# Patient Record
Sex: FEMALE | Race: WHITE | NOT HISPANIC OR LATINO | URBAN - METROPOLITAN AREA
[De-identification: names, ages, dates, MRNs, and addresses within clinical notes are randomized per-mention and may not be internally consistent; named-entity substitution may affect disease eponyms.]

---

## 2020-03-03 ENCOUNTER — APPOINTMENT (OUTPATIENT)
Dept: WOMENS IMAGING | Facility: HOSPITAL | Age: 65
End: 2020-03-03

## 2020-03-03 PROCEDURE — 77067 SCR MAMMO BI INCL CAD: CPT | Performed by: RADIOLOGY

## 2023-07-28 ENCOUNTER — OFFICE VISIT (OUTPATIENT)
Dept: FAMILY MEDICINE CLINIC | Facility: CLINIC | Age: 68
End: 2023-07-28
Payer: MEDICARE

## 2023-07-28 VITALS
WEIGHT: 138.5 LBS | HEART RATE: 52 BPM | OXYGEN SATURATION: 94 % | DIASTOLIC BLOOD PRESSURE: 80 MMHG | TEMPERATURE: 95.7 F | SYSTOLIC BLOOD PRESSURE: 120 MMHG | BODY MASS INDEX: 23.64 KG/M2 | HEIGHT: 64 IN

## 2023-07-28 DIAGNOSIS — I10 PRIMARY HYPERTENSION: Primary | ICD-10-CM

## 2023-07-28 DIAGNOSIS — Z98.890 PERIPHERAL ARTERIAL DISEASE WITH HISTORY OF REVASCULARIZATION: ICD-10-CM

## 2023-07-28 DIAGNOSIS — Z87.81 HISTORY OF FRACTURED PELVIS: ICD-10-CM

## 2023-07-28 DIAGNOSIS — Z79.899 HIGH RISK MEDICATION USE: ICD-10-CM

## 2023-07-28 DIAGNOSIS — F33.40 RECURRENT MAJOR DEPRESSIVE DISORDER, IN REMISSION: ICD-10-CM

## 2023-07-28 DIAGNOSIS — Z13.1 SCREENING FOR DIABETES MELLITUS: ICD-10-CM

## 2023-07-28 DIAGNOSIS — Z78.0 MENOPAUSE: ICD-10-CM

## 2023-07-28 DIAGNOSIS — Z12.31 ENCOUNTER FOR SCREENING MAMMOGRAM FOR MALIGNANT NEOPLASM OF BREAST: ICD-10-CM

## 2023-07-28 DIAGNOSIS — Z23 ENCOUNTER FOR IMMUNIZATION: ICD-10-CM

## 2023-07-28 DIAGNOSIS — J41.0 SIMPLE CHRONIC BRONCHITIS: ICD-10-CM

## 2023-07-28 DIAGNOSIS — J45.40 MODERATE PERSISTENT ASTHMA WITHOUT COMPLICATION: ICD-10-CM

## 2023-07-28 DIAGNOSIS — E67.3 HIGH VITAMIN D LEVEL: ICD-10-CM

## 2023-07-28 DIAGNOSIS — E78.5 HYPERLIPIDEMIA, UNSPECIFIED HYPERLIPIDEMIA TYPE: ICD-10-CM

## 2023-07-28 DIAGNOSIS — I73.9 PERIPHERAL ARTERIAL DISEASE WITH HISTORY OF REVASCULARIZATION: ICD-10-CM

## 2023-07-28 DIAGNOSIS — Z11.59 NEED FOR HEPATITIS C SCREENING TEST: ICD-10-CM

## 2023-07-28 DIAGNOSIS — Z87.891 PERSONAL HISTORY OF TOBACCO USE: ICD-10-CM

## 2023-07-28 DIAGNOSIS — Z12.11 SCREENING FOR COLON CANCER: ICD-10-CM

## 2023-07-28 DIAGNOSIS — F41.1 GENERALIZED ANXIETY DISORDER: ICD-10-CM

## 2023-07-28 PROBLEM — M54.16 LUMBAR RADICULOPATHY: Status: ACTIVE | Noted: 2018-02-22

## 2023-07-28 PROBLEM — M43.10 SPONDYLOLISTHESIS: Status: ACTIVE | Noted: 2018-02-22

## 2023-07-28 PROBLEM — M54.50 LOW BACK PAIN: Status: ACTIVE | Noted: 2018-02-22

## 2023-07-28 PROBLEM — J45.909 ASTHMA: Status: ACTIVE | Noted: 2018-02-22

## 2023-07-28 PROCEDURE — 3074F SYST BP LT 130 MM HG: CPT | Performed by: PHYSICIAN ASSISTANT

## 2023-07-28 PROCEDURE — 3079F DIAST BP 80-89 MM HG: CPT | Performed by: PHYSICIAN ASSISTANT

## 2023-07-28 PROCEDURE — 99204 OFFICE O/P NEW MOD 45 MIN: CPT | Performed by: PHYSICIAN ASSISTANT

## 2023-07-28 RX ORDER — ESCITALOPRAM OXALATE 10 MG/1
1 TABLET ORAL DAILY
COMMUNITY
Start: 2023-05-20 | End: 2023-07-28 | Stop reason: SDUPTHER

## 2023-07-28 RX ORDER — NEBIVOLOL 5 MG/1
1 TABLET ORAL DAILY
COMMUNITY
Start: 2023-07-09 | End: 2023-07-28 | Stop reason: SDUPTHER

## 2023-07-28 RX ORDER — ATORVASTATIN CALCIUM 20 MG/1
20 TABLET, FILM COATED ORAL DAILY
Qty: 90 TABLET | Refills: 1 | Status: SHIPPED | OUTPATIENT
Start: 2023-07-28

## 2023-07-28 RX ORDER — AMLODIPINE BESYLATE 10 MG/1
10 TABLET ORAL DAILY
COMMUNITY
Start: 2023-07-14 | End: 2023-07-28 | Stop reason: SDUPTHER

## 2023-07-28 RX ORDER — LISINOPRIL 30 MG/1
30 TABLET ORAL EVERY 12 HOURS SCHEDULED
Qty: 180 TABLET | Refills: 1 | Status: SHIPPED | OUTPATIENT
Start: 2023-07-28

## 2023-07-28 RX ORDER — NEBIVOLOL 5 MG/1
5 TABLET ORAL DAILY
Qty: 90 TABLET | Refills: 1 | Status: SHIPPED | OUTPATIENT
Start: 2023-07-28

## 2023-07-28 RX ORDER — TIOTROPIUM BROMIDE INHALATION SPRAY 3.12 UG/1
2 SPRAY, METERED RESPIRATORY (INHALATION) DAILY
COMMUNITY
Start: 2023-06-19 | End: 2023-07-28 | Stop reason: SDUPTHER

## 2023-07-28 RX ORDER — TIOTROPIUM BROMIDE INHALATION SPRAY 3.12 UG/1
2 SPRAY, METERED RESPIRATORY (INHALATION)
Qty: 12 G | Refills: 1 | Status: SHIPPED | OUTPATIENT
Start: 2023-07-28

## 2023-07-28 RX ORDER — AMLODIPINE BESYLATE 10 MG/1
10 TABLET ORAL DAILY
Qty: 90 TABLET | Refills: 1 | Status: SHIPPED | OUTPATIENT
Start: 2023-07-28

## 2023-07-28 RX ORDER — ATORVASTATIN CALCIUM 20 MG/1
1 TABLET, FILM COATED ORAL DAILY
COMMUNITY
Start: 2023-05-13 | End: 2023-07-28 | Stop reason: SDUPTHER

## 2023-07-28 RX ORDER — ESCITALOPRAM OXALATE 10 MG/1
10 TABLET ORAL DAILY
Qty: 90 TABLET | Refills: 1 | Status: SHIPPED | OUTPATIENT
Start: 2023-07-28

## 2023-07-28 RX ORDER — HYDROCODONE BITARTRATE AND ACETAMINOPHEN 5; 325 MG/1; MG/1
TABLET ORAL AS NEEDED
COMMUNITY
Start: 2023-07-27

## 2023-07-28 RX ORDER — LISINOPRIL 30 MG/1
1 TABLET ORAL EVERY 12 HOURS SCHEDULED
COMMUNITY
Start: 2023-06-27 | End: 2023-07-28 | Stop reason: SDUPTHER

## 2023-07-28 RX ORDER — ASPIRIN 81 MG/1
81 TABLET ORAL DAILY
COMMUNITY

## 2023-07-28 NOTE — ASSESSMENT & PLAN NOTE
Says she has both COPD and asthma.  She did quit smoking 2018.  She is currently doing okay on her Spiriva.

## 2023-07-28 NOTE — ASSESSMENT & PLAN NOTE
Continue with risk factor modification.  Sugars well controlled, we will see what her cholesterol is running, she takes an aspirin daily and she stopped smoking 5 years ago.

## 2023-07-29 LAB
25(OH)D3+25(OH)D2 SERPL-MCNC: 90.9 NG/ML (ref 30–100)
ALBUMIN SERPL-MCNC: 4.6 G/DL (ref 3.9–4.9)
ALBUMIN/GLOB SERPL: 1.7 {RATIO} (ref 1.2–2.2)
ALP SERPL-CCNC: 73 IU/L (ref 44–121)
ALT SERPL-CCNC: 21 IU/L (ref 0–32)
AST SERPL-CCNC: 25 IU/L (ref 0–40)
BASOPHILS # BLD AUTO: 0 X10E3/UL (ref 0–0.2)
BASOPHILS NFR BLD AUTO: 0 %
BILIRUB SERPL-MCNC: 0.2 MG/DL (ref 0–1.2)
BUN SERPL-MCNC: 22 MG/DL (ref 8–27)
BUN/CREAT SERPL: 14 (ref 12–28)
CALCIUM SERPL-MCNC: 10.2 MG/DL (ref 8.7–10.3)
CHLORIDE SERPL-SCNC: 102 MMOL/L (ref 96–106)
CHOLEST SERPL-MCNC: 173 MG/DL (ref 100–199)
CK SERPL-CCNC: 82 U/L (ref 32–182)
CO2 SERPL-SCNC: 24 MMOL/L (ref 20–29)
CREAT SERPL-MCNC: 1.53 MG/DL (ref 0.57–1)
EGFRCR SERPLBLD CKD-EPI 2021: 36 ML/MIN/1.73
EOSINOPHIL # BLD AUTO: 0.1 X10E3/UL (ref 0–0.4)
EOSINOPHIL NFR BLD AUTO: 2 %
ERYTHROCYTE [DISTWIDTH] IN BLOOD BY AUTOMATED COUNT: 13 % (ref 11.7–15.4)
FOLATE SERPL-MCNC: >20 NG/ML
GLOBULIN SER CALC-MCNC: 2.7 G/DL (ref 1.5–4.5)
GLUCOSE SERPL-MCNC: 93 MG/DL (ref 70–99)
HCT VFR BLD AUTO: 42.1 % (ref 34–46.6)
HCV AB SERPL QL IA: NORMAL
HCV IGG SERPL QL IA: NON REACTIVE
HDLC SERPL-MCNC: 45 MG/DL
HGB BLD-MCNC: 13.9 G/DL (ref 11.1–15.9)
IMM GRANULOCYTES # BLD AUTO: 0 X10E3/UL (ref 0–0.1)
IMM GRANULOCYTES NFR BLD AUTO: 0 %
LDLC SERPL CALC-MCNC: 100 MG/DL (ref 0–99)
LYMPHOCYTES # BLD AUTO: 1.6 X10E3/UL (ref 0.7–3.1)
LYMPHOCYTES NFR BLD AUTO: 24 %
MCH RBC QN AUTO: 30.9 PG (ref 26.6–33)
MCHC RBC AUTO-ENTMCNC: 33 G/DL (ref 31.5–35.7)
MCV RBC AUTO: 94 FL (ref 79–97)
MONOCYTES # BLD AUTO: 0.5 X10E3/UL (ref 0.1–0.9)
MONOCYTES NFR BLD AUTO: 8 %
NEUTROPHILS # BLD AUTO: 4.5 X10E3/UL (ref 1.4–7)
NEUTROPHILS NFR BLD AUTO: 66 %
PLATELET # BLD AUTO: 224 X10E3/UL (ref 150–450)
POTASSIUM SERPL-SCNC: 4.8 MMOL/L (ref 3.5–5.2)
PROT SERPL-MCNC: 7.3 G/DL (ref 6–8.5)
RBC # BLD AUTO: 4.5 X10E6/UL (ref 3.77–5.28)
SODIUM SERPL-SCNC: 141 MMOL/L (ref 134–144)
T4 FREE SERPL-MCNC: 1.17 NG/DL (ref 0.82–1.77)
TRIGL SERPL-MCNC: 160 MG/DL (ref 0–149)
TSH SERPL DL<=0.005 MIU/L-ACNC: 1.5 UIU/ML (ref 0.45–4.5)
VIT B12 SERPL-MCNC: 910 PG/ML (ref 232–1245)
VLDLC SERPL CALC-MCNC: 28 MG/DL (ref 5–40)
WBC # BLD AUTO: 6.8 X10E3/UL (ref 3.4–10.8)

## 2023-08-03 ENCOUNTER — TELEPHONE (OUTPATIENT)
Dept: FAMILY MEDICINE CLINIC | Facility: CLINIC | Age: 68
End: 2023-08-03
Payer: MEDICARE

## 2023-09-06 ENCOUNTER — OFFICE VISIT (OUTPATIENT)
Dept: FAMILY MEDICINE CLINIC | Facility: CLINIC | Age: 68
End: 2023-09-06
Payer: MEDICARE

## 2023-09-06 VITALS
BODY MASS INDEX: 23.66 KG/M2 | DIASTOLIC BLOOD PRESSURE: 88 MMHG | HEIGHT: 64 IN | SYSTOLIC BLOOD PRESSURE: 146 MMHG | TEMPERATURE: 98 F | WEIGHT: 138.6 LBS | RESPIRATION RATE: 15 BRPM

## 2023-09-06 DIAGNOSIS — J41.0 SIMPLE CHRONIC BRONCHITIS: ICD-10-CM

## 2023-09-06 DIAGNOSIS — M54.32 SCIATICA OF LEFT SIDE: Primary | ICD-10-CM

## 2023-09-06 DIAGNOSIS — Z00.00 MEDICARE ANNUAL WELLNESS VISIT, INITIAL: ICD-10-CM

## 2023-09-06 DIAGNOSIS — I10 PRIMARY HYPERTENSION: ICD-10-CM

## 2023-09-06 DIAGNOSIS — F41.1 GENERALIZED ANXIETY DISORDER: ICD-10-CM

## 2023-09-06 DIAGNOSIS — E78.2 MIXED HYPERLIPIDEMIA: ICD-10-CM

## 2023-09-06 DIAGNOSIS — I73.9 PERIPHERAL ARTERIAL DISEASE WITH HISTORY OF REVASCULARIZATION: ICD-10-CM

## 2023-09-06 DIAGNOSIS — Z98.890 PERIPHERAL ARTERIAL DISEASE WITH HISTORY OF REVASCULARIZATION: ICD-10-CM

## 2023-09-06 DIAGNOSIS — F33.40 RECURRENT MAJOR DEPRESSIVE DISORDER, IN REMISSION: ICD-10-CM

## 2023-09-06 NOTE — ASSESSMENT & PLAN NOTE
Blood pressure little high today, nephrology recently increased her lisinopril and her atorvastatin.  She said blood pressure has been running okay at home.  Continue to monitor and follow with nephrology.

## 2023-09-06 NOTE — PROGRESS NOTES
The ABCs of the Annual Wellness Visit  Initial Medicare Wellness Visit    Subjective     Orin Munoz is a 68 y.o. female who presents for an Initial Medicare Wellness Visit.    The following portions of the patient's history were reviewed and   updated as appropriate: allergies, current medications, past family history, past medical history, past social history, past surgical history, and problem list.     Compared to one year ago, the patient feels her physical   health is worse.    Compared to one year ago, the patient feels her mental   health is the same.    Recent Hospitalizations:  She was admitted within the past 365 days at [facility] November 2022 for pneumonia- 4 days.       Current Medical Providers:  Patient Care Team:  Yomaira Saldana PA as PCP - General (Family Medicine)  Tristan Springer MD as Consulting Physician (Internal Medicine)  Cuco Alicia MD as Consulting Physician (Cardiology)  Walt Graham DPM as Consulting Physician (Podiatry)  Derrick Black MD as Consulting Physician (Ophthalmology)  Mango Jane MD as Consulting Physician (Orthopedic Surgery)  Tyra Melissa MD as Consulting Physician (Nephrology)  Montez Barnes MD as Consulting Physician (Orthopedic Surgery)    Outpatient Medications Prior to Visit   Medication Sig Dispense Refill    amLODIPine (NORVASC) 10 MG tablet Take 1 tablet by mouth Daily. 90 tablet 1    aspirin 81 MG EC tablet Take 1 tablet by mouth Daily.      atorvastatin (LIPITOR) 20 MG tablet Take 1 tablet by mouth Daily. 90 tablet 1    escitalopram (LEXAPRO) 10 MG tablet Take 1 tablet by mouth Daily. 90 tablet 1    lisinopril (PRINIVIL,ZESTRIL) 30 MG tablet Take 1 tablet by mouth Every 12 (Twelve) Hours. 180 tablet 1    nebivolol (BYSTOLIC) 5 MG tablet Take 1 tablet by mouth Daily. 90 tablet 1    tiotropium bromide monohydrate (Spiriva Respimat) 2.5 MCG/ACT aerosol solution inhaler Inhale 2 puffs Daily. 12 g 1     "HYDROcodone-acetaminophen (NORCO) 5-325 MG per tablet Take  by mouth As Needed.      Zoster Vac Recomb Adjuvanted 50 MCG/0.5ML reconstituted suspension Inject 0.5 mL into the appropriate muscle as directed by prescriber Every 2 (Two) Months. 0.5 mL 1     No facility-administered medications prior to visit.       No opioid medication identified on active medication list. I have reviewed chart for other potential  high risk medication/s and harmful drug interactions in the elderly.        Aspirin is on active medication list. Aspirin use is indicated based on review of current medical condition/s. Pros and cons of this therapy have been discussed today. Benefits of this medication outweigh potential harm.  Patient has been encouraged to continue taking this medication.  .      Patient Active Problem List   Diagnosis    Asthma    Low back pain    Lumbar radiculopathy    Spondylolisthesis    Simple chronic bronchitis    History of fractured pelvis    Primary hypertension    Hyperlipidemia    Recurrent major depressive disorder, in remission    Generalized anxiety disorder    Peripheral arterial disease with history of revascularization    Medicare annual wellness visit, initial    Sciatica of left side     Advance Care Planning   Advance Care Planning     Advance Directive is not on file.  ACP discussion was held with the patient during this visit. Patient has an advance directive (not in EMR), copy requested.       Objective    Vitals:    09/06/23 0922   BP: 146/88   BP Location: Left arm   Patient Position: Sitting   Cuff Size: Adult   Resp: 15   Temp: 98 °F (36.7 °C)   TempSrc: Temporal   Weight: 62.9 kg (138 lb 9.6 oz)   Height: 162.6 cm (64\")     Estimated body mass index is 23.79 kg/m² as calculated from the following:    Height as of this encounter: 162.6 cm (64\").    Weight as of this encounter: 62.9 kg (138 lb 9.6 oz).    BMI is within normal parameters. No other follow-up for BMI required.      Does the " patient have evidence of cognitive impairment?   No    Lab Results   Component Value Date    CHLPL 173 2023    TRIG 160 (H) 2023    HDL 45 2023     (H) 2023    VLDL 28 2023        HEALTH RISK ASSESSMENT    Smoking Status:  Social History     Tobacco Use   Smoking Status Former    Packs/day: 1.00    Years: 30.00    Pack years: 30.00    Types: Cigarettes    Start date:     Quit date: 2018    Years since quittin.3   Smokeless Tobacco Never     Alcohol Consumption:  Social History     Substance and Sexual Activity   Alcohol Use Not Currently     Fall Risk Screen:    BA Fall Risk Assessment was completed, and patient is at MODERATE risk for falls. Assessment completed on:2023    Depression Screen:       2023     9:19 AM   PHQ-2/PHQ-9 Depression Screening   Little Interest or Pleasure in Doing Things 0-->not at all   Feeling Down, Depressed or Hopeless 0-->not at all   PHQ-9: Brief Depression Severity Measure Score 0       Health Habits and Functional and Cognitive Screenin/6/2023     9:00 AM   Functional & Cognitive Status   Do you have difficulty preparing food and eating? No   Do you have difficulty bathing yourself, getting dressed or grooming yourself? No   Do you have difficulty using the toilet? No   Do you have difficulty moving around from place to place? No   Do you have trouble with steps or getting out of a bed or a chair? No   Current Diet Well Balanced Diet   Dental Exam Up to date   Eye Exam Up to date   Exercise (times per week) 7 times per week   Current Exercises Include House Cleaning;Other   Do you need help using the phone?  No   Are you deaf or do you have serious difficulty hearing?  No   Do you need help to go to places out of walking distance? No   Do you need help shopping? No   Do you need help preparing meals?  No   Do you need help with housework?  No   Do you need help with laundry? No   Do you need help taking your  medications? No   Do you need help managing money? No   Do you ever drive or ride in a car without wearing a seat belt? No   Have you felt unusual stress, anger or loneliness in the last month? Yes   Who do you live with? Alone   If you need help, do you have trouble finding someone available to you? No   Have you been bothered in the last four weeks by sexual problems? No   Do you have difficulty concentrating, remembering or making decisions? Yes       Age-appropriate Screening Schedule:  Refer to the list below for future screening recommendations based on patient's age, sex and/or medical conditions. Orders for these recommended tests are listed in the plan section. The patient has been provided with a written plan.    Health Maintenance   Topic Date Due    MAMMOGRAM  Never done    DXA SCAN  Never done    LUNG CANCER SCREENING  Never done    ANNUAL WELLNESS VISIT  Never done    COVID-19 Vaccine (4 - Pfizer series) 10/28/2023 (Originally 2/24/2022)    ZOSTER VACCINE (1 of 2) 07/28/2024 (Originally 6/8/2003)    Pneumococcal Vaccine 65+ (1 - PCV) 09/06/2024 (Originally 6/8/1961)    TDAP/TD VACCINES (1 - Tdap) 09/06/2024 (Originally 6/8/1972)    INFLUENZA VACCINE  10/01/2023    LIPID PANEL  07/28/2024    COLORECTAL CANCER SCREENING  08/08/2026    HEPATITIS C SCREENING  Completed          CMS Preventative Services Quick Reference  Risk Factors Identified During Encounter    Alcohol Misuse: Patient encouraged to stop all alcohol use     The above risks/problems have been discussed with the patient.  Pertinent information has been shared with the patient in the After Visit Summary.  An After Visit Summary and PPPS were made available to the patient.  Diagnoses and all orders for this visit:    1. Sciatica of left side (Primary)  Assessment & Plan:  Seeing orthopedic spine surgery Montez Barnes MD.      2. Medicare annual wellness visit, initial  Assessment & Plan:  Updated annual wellness visit checklist.  Immunizations  discussed.  Screening up-to-date.  Recommend yearly dental and eye exams. Also discussed monitoring of blood pressure and lipids. We addressed patient self-assessment of health status, frailty, and physical functioning. We reviewed psychosocial risks, behavioral risks, instrumental activities of daily living, and patient health risk assessment. Patient was given a personalized prevention plan.        3. Peripheral arterial disease with history of revascularization  Assessment & Plan:  Continue with medication and risk factor modification.      4. Recurrent major depressive disorder, in remission  Assessment & Plan:  Patient's depression is recurrent and is moderate without psychosis. Their depression is currently in full remission and the condition is improving with treatment. This will be reassessed in 3 months. F/U as described:patient will continue current medication therapy.      5. Primary hypertension  Assessment & Plan:  Blood pressure little high today, nephrology recently increased her lisinopril and her atorvastatin.  She said blood pressure has been running okay at home.  Continue to monitor and follow with nephrology.      6. Generalized anxiety disorder  Assessment & Plan:  Psychological condition is improving with treatment.  Continue current treatment regimen.  Psychological condition  will be reassessed in 3 months.      7. Mixed hyperlipidemia  Assessment & Plan:  Lipid abnormalities are improving with treatment.  Pharmacotherapy as ordered.  Lipids will be reassessed in 3 months.      8. Simple chronic bronchitis  Assessment & Plan:  Stable on inhalers.        Follow Up:  Next Medicare Wellness visit to be scheduled in 1 year.

## 2023-09-06 NOTE — ASSESSMENT & PLAN NOTE
Patient's depression is recurrent and is moderate without psychosis. Their depression is currently in full remission and the condition is improving with treatment. This will be reassessed in 3 months. F/U as described:patient will continue current medication therapy.

## 2023-09-06 NOTE — ASSESSMENT & PLAN NOTE
Updated annual wellness visit checklist.  Immunizations discussed.  Screening up-to-date.  Recommend yearly dental and eye exams. Also discussed monitoring of blood pressure and lipids. We addressed patient self-assessment of health status, frailty, and physical functioning. We reviewed psychosocial risks, behavioral risks, instrumental activities of daily living, and patient health risk assessment. Patient was given a personalized prevention plan.

## 2023-12-08 ENCOUNTER — OFFICE VISIT (OUTPATIENT)
Dept: FAMILY MEDICINE CLINIC | Facility: CLINIC | Age: 68
End: 2023-12-08
Payer: MEDICARE

## 2023-12-08 VITALS
WEIGHT: 142.5 LBS | HEIGHT: 64 IN | OXYGEN SATURATION: 98 % | DIASTOLIC BLOOD PRESSURE: 72 MMHG | SYSTOLIC BLOOD PRESSURE: 136 MMHG | BODY MASS INDEX: 24.33 KG/M2 | RESPIRATION RATE: 15 BRPM | HEART RATE: 76 BPM

## 2023-12-08 DIAGNOSIS — N18.32 STAGE 3B CHRONIC KIDNEY DISEASE: ICD-10-CM

## 2023-12-08 DIAGNOSIS — I73.9 PERIPHERAL ARTERIAL DISEASE WITH HISTORY OF REVASCULARIZATION: ICD-10-CM

## 2023-12-08 DIAGNOSIS — Z79.899 HIGH RISK MEDICATION USE: ICD-10-CM

## 2023-12-08 DIAGNOSIS — E78.5 HYPERLIPIDEMIA, UNSPECIFIED HYPERLIPIDEMIA TYPE: ICD-10-CM

## 2023-12-08 DIAGNOSIS — J41.0 SIMPLE CHRONIC BRONCHITIS: ICD-10-CM

## 2023-12-08 DIAGNOSIS — F33.40 RECURRENT MAJOR DEPRESSIVE DISORDER, IN REMISSION: ICD-10-CM

## 2023-12-08 DIAGNOSIS — I10 PRIMARY HYPERTENSION: Primary | ICD-10-CM

## 2023-12-08 DIAGNOSIS — Z23 ENCOUNTER FOR IMMUNIZATION: ICD-10-CM

## 2023-12-08 DIAGNOSIS — F41.1 GENERALIZED ANXIETY DISORDER: ICD-10-CM

## 2023-12-08 DIAGNOSIS — Z98.890 PERIPHERAL ARTERIAL DISEASE WITH HISTORY OF REVASCULARIZATION: ICD-10-CM

## 2023-12-08 DIAGNOSIS — M47.816 LUMBAR SPONDYLOSIS: ICD-10-CM

## 2023-12-08 DIAGNOSIS — I71.60 THORACOABDOMINAL AORTIC ANEURYSM (TAAA) WITHOUT RUPTURE, UNSPECIFIED PART: ICD-10-CM

## 2023-12-08 PROBLEM — R01.1 MURMUR, CARDIAC: Status: ACTIVE | Noted: 2023-09-26

## 2023-12-08 PROBLEM — J44.9 COPD (CHRONIC OBSTRUCTIVE PULMONARY DISEASE): Chronic | Status: ACTIVE | Noted: 2023-09-26

## 2023-12-08 PROBLEM — R09.89 CAROTID BRUIT: Status: ACTIVE | Noted: 2023-09-26

## 2023-12-08 LAB — POC MICROALBUMIN URINE: 0.2

## 2023-12-08 RX ORDER — TIOTROPIUM BROMIDE INHALATION SPRAY 3.12 UG/1
2 SPRAY, METERED RESPIRATORY (INHALATION)
Qty: 12 G | Refills: 1 | Status: SHIPPED | OUTPATIENT
Start: 2023-12-08

## 2023-12-08 RX ORDER — ATORVASTATIN CALCIUM 20 MG/1
20 TABLET, FILM COATED ORAL DAILY
Qty: 90 TABLET | Refills: 1 | Status: SHIPPED | OUTPATIENT
Start: 2023-12-08 | End: 2023-12-08 | Stop reason: SDUPTHER

## 2023-12-08 RX ORDER — ESCITALOPRAM OXALATE 10 MG/1
10 TABLET ORAL DAILY
Qty: 90 TABLET | Refills: 1 | Status: SHIPPED | OUTPATIENT
Start: 2023-12-08

## 2023-12-08 RX ORDER — LISINOPRIL 30 MG/1
30 TABLET ORAL EVERY 12 HOURS SCHEDULED
Qty: 180 TABLET | Refills: 1 | Status: SHIPPED | OUTPATIENT
Start: 2023-12-08

## 2023-12-08 RX ORDER — ATORVASTATIN CALCIUM 40 MG/1
40 TABLET, FILM COATED ORAL DAILY
Qty: 90 TABLET | Refills: 1 | Status: SHIPPED | OUTPATIENT
Start: 2023-12-08

## 2023-12-08 RX ORDER — PREGABALIN 25 MG/1
25 CAPSULE ORAL 2 TIMES DAILY
COMMUNITY
Start: 2023-10-11

## 2023-12-08 RX ORDER — NEBIVOLOL 5 MG/1
5 TABLET ORAL DAILY
Qty: 90 TABLET | Refills: 1 | Status: SHIPPED | OUTPATIENT
Start: 2023-12-08

## 2023-12-08 RX ORDER — AMLODIPINE BESYLATE 10 MG/1
10 TABLET ORAL DAILY
Qty: 90 TABLET | Refills: 1 | Status: SHIPPED | OUTPATIENT
Start: 2023-12-08

## 2023-12-08 NOTE — PROGRESS NOTES
Patient Office Visit      Patient Name: Orin Munoz  : 1955   MRN: [id number]     Chief Complaint:    Chief Complaint   Patient presents with    Hypertension    COPD    Anxiety       History of Present Illness: Orin Munoz is a 68 y.o. female who is here today for follow-up chronic medical conditions and needing some refills.  She continues to have low back pain.  She was referred to a spine doctor who then referred her to an anesthesiology pain management doctor in [place].  The doctor started her on Lyrica and patient was due to follow-up next week but was frustrated and canceled her appointment.  She was supposed to have injections in her back pending insurance approval and never heard back about the insurance approval.  She did have an MRI of her lumbar spine done which showed multilevel spondylitic changes with findings most pronounced at L4-5 and L5-S1 and an incidental note of a AAA.  Her low-dose CT had also showed a thoracic aortic aneurysm and severe coronary calcification.  Patient reports pain left foot with walking.  Podiatry ordered ultrasound of the lower extremity arteries complete bilateral which showed an GERALD of 0.77 on the right but with decreased velocities within the distal left lower extremity concerning for high-grade stenosis and inability to calculate left PI secondary to limited appreciation of pulses.  Patient was given a copy of the report to give to her cardiologist.  She has already had stents placed wants to left lower extremity.  Patient gave me a copy of the report which I will place in her file.    Subjective      Review of Systems:   Review of Systems   Constitutional:  Positive for fatigue.   Respiratory:  Negative for shortness of breath.    Cardiovascular:  Negative for chest pain, palpitations and leg swelling.        Claudication left lower extremity   Musculoskeletal:  Positive for back pain.        Past Medical History:   Past Medical History:    Diagnosis Date    Alcoholism     Allergic     Penicillin,alupent    Arthritis 15 years ago    Asthma 20 years ago    Cataract     COPD (chronic obstructive pulmonary disease) 2015    Coronary artery disease 2018    Acute pulmonary hemmorage of the abdomen    Hyperlipidemia 2015    Hypertension 2000    Low back pain N/A    Osteopenia 2018    Pneumonia     Hospitalized for 4 days.    Renal insufficiency     Visual impairment        Past Surgical History:   Past Surgical History:   Procedure Laterality Date    APPENDECTOMY  1971    CHOLECYSTECTOMY  2015    COLONOSCOPY  2016    EYE SURGERY  2020    Cataract    HYSTERECTOMY  18 years old       Family History:   Family History   Problem Relation Age of Onset    Arthritis Mother     COPD Mother     Heart disease Mother     Hyperlipidemia Mother     Diabetes Maternal Grandmother     Alcohol abuse Brother         Passed away in     Drug abuse Brother     Cancer Brother         Retnal cancer    Early death Brother         Retinal cancer    Heart disease Sister        Social History:   Social History     Socioeconomic History    Marital status: Unknown   Tobacco Use    Smoking status: Former     Packs/day: 1.00     Years: 30.00     Additional pack years: 0.00     Total pack years: 30.00     Types: Cigarettes     Start date:      Quit date: 2018     Years since quittin.5    Smokeless tobacco: Never   Vaping Use    Vaping Use: Never used   Substance and Sexual Activity    Alcohol use: Not Currently    Drug use: Not Currently     Types: Marijuana    Sexual activity: Not Currently     Partners: Male     Birth control/protection: Condom, Birth control pill, Hysterectomy       Allergies:   Allergies   Allergen Reactions    Penicillins Hives    Metaproterenol Other (See Comments)    Other Other (See Comments)     Alupent       Objective     Physical Exam:  Vital Signs:   Vitals:    23 0857   BP: 136/72   BP Location: Right arm   Patient  "Position: Sitting   Cuff Size: Adult   Pulse: 76   Resp: 15   SpO2: 98%   Weight: 64.6 kg (142 lb 8 oz)   Height: 162.6 cm (64\")     Body mass index is 24.46 kg/m².   BMI is within normal parameters. No other follow-up for BMI required.       Physical Exam  Constitutional:       Appearance: She is normal weight.   Cardiovascular:      Rate and Rhythm: Normal rate and regular rhythm.   Pulmonary:      Effort: Pulmonary effort is normal.      Breath sounds: Normal breath sounds.   Neurological:      General: No focal deficit present.   Psychiatric:         Thought Content: Thought content normal.         Judgment: Judgment normal.         Procedures    Assessment / Plan      Assessment/Plan:   Diagnoses and all orders for this visit:    1. Primary hypertension (Primary)  Assessment & Plan:  Hypertension is improving with treatment.  Continue current treatment regimen.  Blood pressure will be reassessed at the next regular appointment.    Orders:  -     amLODIPine (NORVASC) 10 MG tablet; Take 1 tablet by mouth Daily.  Dispense: 90 tablet; Refill: 1  -     lisinopril (PRINIVIL,ZESTRIL) 30 MG tablet; Take 1 tablet by mouth Every 12 (Twelve) Hours.  Dispense: 180 tablet; Refill: 1  -     nebivolol (BYSTOLIC) 5 MG tablet; Take 1 tablet by mouth Daily.  Dispense: 90 tablet; Refill: 1    2. Hyperlipidemia, unspecified hyperlipidemia type  Assessment & Plan:  Lipid abnormalities are improving with treatment.  Pharmacotherapy as ordered.  Increased her atorvastatin from 20 mg to 40 mg.  Follow-up with cardiologist as scheduled.  Lipids will be reassessed in 6 months.    Orders:  -     Discontinue: atorvastatin (LIPITOR) 20 MG tablet; Take 1 tablet by mouth Daily.  Dispense: 90 tablet; Refill: 1  -     Lipid Panel  -     atorvastatin (LIPITOR) 40 MG tablet; Take 1 tablet by mouth Daily. Cancel rx just sent for 20 mg and cancel any remaining refills for 20 mg.  Dispense: 90 tablet; Refill: 1    3. Generalized anxiety disorder  - "     escitalopram (LEXAPRO) 10 MG tablet; Take 1 tablet by mouth Daily.  Dispense: 90 tablet; Refill: 1    4. Recurrent major depressive disorder, in remission  -     escitalopram (LEXAPRO) 10 MG tablet; Take 1 tablet by mouth Daily.  Dispense: 90 tablet; Refill: 1    5. Simple chronic bronchitis  -     tiotropium bromide monohydrate (Spiriva Respimat) 2.5 MCG/ACT aerosol solution inhaler; Inhale 2 puffs Daily.  Dispense: 12 g; Refill: 1    6. Encounter for immunization  -     Fluzone High-Dose 65+yrs  -     Pneumococcal Conjugate Vaccine 20-Valent All    7. Stage 3b chronic kidney disease  Assessment & Plan:  Managed by nephrology.    Orders:  -     Comprehensive Metabolic Panel  -     Cancel: Protein Elec + Interp, Serum  -     Cancel: Protein Electrophoresis, 24 Hr Urine - Urine, Clean Catch  -     POC Microalbumin  -     Cancel: Protein Electrophoresis, Random Urine - Urine, Clean Catch; Future  -     Cancel: Protein Electrophoresis, Random Urine - Urine, Clean Catch  -     Cancel: Protein Electrophoresis, Random Urine - Urine, Clean Catch  -     Cancel: Protein Electrophoresis, Random Urine - Urine, Clean Catch    8. High risk medication use  -     Comprehensive Metabolic Panel  -     CK  -     CBC Auto Differential    9. Peripheral arterial disease with history of revascularization  Assessment & Plan:  Follow with cardiology as scheduled.      10. Lumbar spondylosis  Assessment & Plan:  Patient is frustrated with the process of getting some help with her back pain.  She said she had called the doctor's office but the doctor did not call her back regarding insurance approval of injections in her back.  It would be staff that would be working on that approval.  I encouraged her to call back to the office and talk with staff about the status of insurance approval and see if she can get rescheduled.  I did give her the option of referral to Dr. Lima for pain management who sees patients here in [place] but she  would be starting all over again.  Patient will reach out to the doctor she has already established with and let me know if she ends up needing referral elsewhere.      11. Thoracoabdominal aortic aneurysm (TAAA) without rupture, unspecified part  Assessment & Plan:  Aneurysm noted on both low-dose chest CT and recent lumbar spine MRI.  Patient has stage IIIb chronic kidney disease so we will defer to cardiology regarding any type of CTA.  I did give her a copy of her report of her low-dose CT to take to her cardiologist.             Medications:     Current Outpatient Medications:     amLODIPine (NORVASC) 10 MG tablet, Take 1 tablet by mouth Daily., Disp: 90 tablet, Rfl: 1    aspirin 81 MG EC tablet, Take 1 tablet by mouth Daily., Disp: , Rfl:     atorvastatin (LIPITOR) 40 MG tablet, Take 1 tablet by mouth Daily. Cancel rx just sent for 20 mg and cancel any remaining refills for 20 mg., Disp: 90 tablet, Rfl: 1    escitalopram (LEXAPRO) 10 MG tablet, Take 1 tablet by mouth Daily., Disp: 90 tablet, Rfl: 1    lisinopril (PRINIVIL,ZESTRIL) 30 MG tablet, Take 1 tablet by mouth Every 12 (Twelve) Hours., Disp: 180 tablet, Rfl: 1    nebivolol (BYSTOLIC) 5 MG tablet, Take 1 tablet by mouth Daily., Disp: 90 tablet, Rfl: 1    pregabalin (LYRICA) 25 MG capsule, Take 1 capsule by mouth 2 (Two) Times a Day., Disp: , Rfl:     tiotropium bromide monohydrate (Spiriva Respimat) 2.5 MCG/ACT aerosol solution inhaler, Inhale 2 puffs Daily., Disp: 12 g, Rfl: 1    I spent 70 minutes caring for Orin on this date of service. This time includes time spent by me in the following activities:preparing for the visit, reviewing tests, obtaining and/or reviewing a separately obtained history, performing a medically appropriate examination and/or evaluation , counseling and educating the patient/family/caregiver, ordering medications, tests, or procedures, and documenting information in the medical record    Follow Up:   Return in about 6 months  (around 6/8/2024) for 30 minute med recheck.    Yomaira Saldana PA-C   [facility]     Answers submitted by the patient for this visit:  Other (Submitted on 12/8/2023)  Please describe your symptoms.: pain on left lower back and leg.  Have you had these symptoms before?: Yes  How long have you been having these symptoms?: Greater than 2 weeks  Please list any medications you are currently taking for this condition.: nothing i am taking pregabalin 25mg.2x a day for foot pain.  Please describe any probable cause for these symptoms. : have foot problems for awhile  Primary Reason for Visit (Submitted on 12/8/2023)  What is the primary reason for your visit?: Other

## 2023-12-08 NOTE — ASSESSMENT & PLAN NOTE
Aneurysm noted on both low-dose chest CT and recent lumbar spine MRI.  Patient has stage IIIb chronic kidney disease so we will defer to cardiology regarding any type of CTA.  I did give her a copy of her report of her low-dose CT to take to her cardiologist.

## 2023-12-08 NOTE — ASSESSMENT & PLAN NOTE
Patient is frustrated with the process of getting some help with her back pain.  She said she had called the doctor's office but the doctor did not call her back regarding insurance approval of injections in her back.  It would be staff that would be working on that approval.  I encouraged her to call back to the office and talk with staff about the status of insurance approval and see if she can get rescheduled.  I did give her the option of referral to Dr. Lima for pain management who sees patients here in [place] but she would be starting all over again.  Patient will reach out to the doctor she has already established with and let me know if she ends up needing referral elsewhere.

## 2023-12-08 NOTE — ASSESSMENT & PLAN NOTE
Lipid abnormalities are improving with treatment.  Pharmacotherapy as ordered.  Increased her atorvastatin from 20 mg to 40 mg.  Follow-up with cardiologist as scheduled.  Lipids will be reassessed in 6 months.

## 2023-12-09 LAB
ALBUMIN SERPL-MCNC: 4.7 G/DL (ref 3.9–4.9)
ALBUMIN/GLOB SERPL: 1.6 {RATIO} (ref 1.2–2.2)
ALP SERPL-CCNC: 69 IU/L (ref 44–121)
ALT SERPL-CCNC: 18 IU/L (ref 0–32)
AST SERPL-CCNC: 29 IU/L (ref 0–40)
BASOPHILS # BLD AUTO: 0 X10E3/UL (ref 0–0.2)
BASOPHILS NFR BLD AUTO: 0 %
BILIRUB SERPL-MCNC: 0.3 MG/DL (ref 0–1.2)
BUN SERPL-MCNC: 36 MG/DL (ref 8–27)
BUN/CREAT SERPL: 25 (ref 12–28)
CALCIUM SERPL-MCNC: 10.1 MG/DL (ref 8.7–10.3)
CHLORIDE SERPL-SCNC: 100 MMOL/L (ref 96–106)
CHOLEST SERPL-MCNC: 211 MG/DL (ref 100–199)
CK SERPL-CCNC: 99 U/L (ref 32–182)
CO2 SERPL-SCNC: 23 MMOL/L (ref 20–29)
CREAT SERPL-MCNC: 1.45 MG/DL (ref 0.57–1)
EGFRCR SERPLBLD CKD-EPI 2021: 39 ML/MIN/1.73
EOSINOPHIL # BLD AUTO: 0.1 X10E3/UL (ref 0–0.4)
EOSINOPHIL NFR BLD AUTO: 2 %
ERYTHROCYTE [DISTWIDTH] IN BLOOD BY AUTOMATED COUNT: 12.9 % (ref 11.7–15.4)
GLOBULIN SER CALC-MCNC: 2.9 G/DL (ref 1.5–4.5)
GLUCOSE SERPL-MCNC: 89 MG/DL (ref 70–99)
HCT VFR BLD AUTO: 39.5 % (ref 34–46.6)
HDLC SERPL-MCNC: 60 MG/DL
HGB BLD-MCNC: 13.3 G/DL (ref 11.1–15.9)
IMM GRANULOCYTES # BLD AUTO: 0 X10E3/UL (ref 0–0.1)
IMM GRANULOCYTES NFR BLD AUTO: 0 %
LDLC SERPL CALC-MCNC: 122 MG/DL (ref 0–99)
LYMPHOCYTES # BLD AUTO: 1.6 X10E3/UL (ref 0.7–3.1)
LYMPHOCYTES NFR BLD AUTO: 28 %
MCH RBC QN AUTO: 30.9 PG (ref 26.6–33)
MCHC RBC AUTO-ENTMCNC: 33.7 G/DL (ref 31.5–35.7)
MCV RBC AUTO: 92 FL (ref 79–97)
MONOCYTES # BLD AUTO: 0.4 X10E3/UL (ref 0.1–0.9)
MONOCYTES NFR BLD AUTO: 7 %
NEUTROPHILS # BLD AUTO: 3.5 X10E3/UL (ref 1.4–7)
NEUTROPHILS NFR BLD AUTO: 63 %
PLATELET # BLD AUTO: 216 X10E3/UL (ref 150–450)
POTASSIUM SERPL-SCNC: 5.3 MMOL/L (ref 3.5–5.2)
PROT SERPL-MCNC: 7.6 G/DL (ref 6–8.5)
RBC # BLD AUTO: 4.3 X10E6/UL (ref 3.77–5.28)
SODIUM SERPL-SCNC: 140 MMOL/L (ref 134–144)
TRIGL SERPL-MCNC: 165 MG/DL (ref 0–149)
VLDLC SERPL CALC-MCNC: 29 MG/DL (ref 5–40)
WBC # BLD AUTO: 5.7 X10E3/UL (ref 3.4–10.8)

## 2023-12-29 NOTE — PROGRESS NOTES
Patient Office Visit      Patient Name: Orin Munoz  : 1955   MRN: [id number]     Chief Complaint:    Chief Complaint   Patient presents with    Hypertension    Establish Care    Hyperlipidemia       History of Present Illness: Orin Munoz is a 68 y.o. female who is here today to establish care.  Her previous PCP is in [place] but is getting too hard for her to travel to [place].  She also has a cardiologist in [place] but is interested in finding a cardiologist here in [employer].  Fell 3-1/2 weeks ago and has a hairline fracture of her pelvis being managed by orthopedics.  Patient admits that she is a binge drinking alcoholic but has not had any alcohol since her fall.  She is hoping to stay off of alcohol.  Her blood pressure has been running much better since she has not been drinking.  She thinks she is up to date on her pneumonia vaccine.  We are supposed to be receiving records from her previous primary care provider.    Subjective      Review of Systems:   Review of Systems   Constitutional:  Negative for fatigue.   Respiratory:  Negative for shortness of breath.    Cardiovascular:  Negative for chest pain, palpitations and leg swelling.      Past Medical History:   Past Medical History:   Diagnosis Date    Alcoholism     Allergic     Penicillin,alupent    Arthritis 15 years ago    Asthma 20 years ago    Cataract     COPD (chronic obstructive pulmonary disease) 2015    Coronary artery disease 2018    Acute pulmonary hemmorage of the abdomen    Hyperlipidemia 2015    Hypertension 2000    Low back pain N/A    Osteopenia 2018    Pneumonia     Hospitalized for 4 days.    Renal insufficiency     Visual impairment        Past Surgical History:   Past Surgical History:   Procedure Laterality Date    APPENDECTOMY  1971    CHOLECYSTECTOMY  2015    COLONOSCOPY  2016    EYE SURGERY  2020    Cataract    HYSTERECTOMY  18 years old       Family History:   Family History   Problem  [facility]  [address] [place] [address]  Phone: [phone]   [phone]  PHYSICAL THERAPY PROGRESS NOTE  Patient Name: Daya Burger : 1958   Treatment/Medical Diagnosis: Left knee pain [M25.562]   Referral Source: Leo Mckeon MD     Date of Initial Visit: 10/18/2023 Attended Visits: 28 Missed Visits: 0     SUMMARY OF TREATMENT  The pt has been seen for 28 visits to address left knee pain s/p patellar fracture ORIF on 2023. Therapeutic interventions have included therapeutic exercise, therapeutic activity, neuromuscular re-education, manual treatment, modalities and patient education.     CURRENT STATUS  Subjective:  % improvement: 80%  Max pain 5/10 while on recumbent bike at gym   Avg pain 3/10  Min pain 0/10     Current improvements: improved strength, improved walking tolerance, improved range of motion   Remaining functional limitations: limited bending, difficulty with stairs, pain with recumbent bike at gym, some difficulty with toilet transfers      FOTO: 57/100 +8 points    Objective Information/Functional Measures/Assessment:  The patient presents for reassessment following 28 visits to address left knee pain s/p patellar ORIF on 2023   Improvement in functional mobility indicated by FOTO score change of +8 points to 57/100   Left knee flexion AROM: improved to 115 deg compared to 104 deg at last PN    Eccentric quad strength: limited during lateral step downs from 6 inch step with pain reported    Left hip flexion strength 5/5, ABD 4+/5    The patient will be placed on a 30 day hold for a trial of IND management of symptoms with HEP. The patient will be reassessed in 30 days for formal discharge if appropriate, or continue PT 2x/week for remainder of 36 certified visits if continued skilled PT services are required.      PROGRESS TOWARDS GOALS:  Progress toward goals / Updated goals:  []  See  "Relation Age of Onset    Arthritis Mother     COPD Mother     Heart disease Mother     Hyperlipidemia Mother     Diabetes Maternal Grandmother     Alcohol abuse Brother         Passed away in     Drug abuse Brother     Cancer Brother         Retnal cancer    Early death Brother         Retinal cancer    Heart disease Sister        Social History:   Social History     Socioeconomic History    Marital status: Unknown   Tobacco Use    Smoking status: Former     Packs/day: 1.00     Years: 30.00     Pack years: 30.00     Types: Cigarettes     Quit date: 2018     Years since quittin.2   Vaping Use    Vaping Use: Never used   Substance and Sexual Activity    Alcohol use: Not Currently    Drug use: Not Currently     Types: Marijuana    Sexual activity: Not Currently     Partners: Male     Birth control/protection: Condom, Birth control pill, Hysterectomy       Allergies:   Allergies   Allergen Reactions    Penicillins Hives    Metaproterenol Other (See Comments)    Other Other (See Comments)     Alupent       Objective     Physical Exam:  Vital Signs:   Vitals:    23 0859   BP: 120/80   Pulse: 52   Temp: 95.7 °F (35.4 °C)   TempSrc: Infrared   SpO2: 94%   Weight: 62.8 kg (138 lb 8 oz)   Height: 162.6 cm (64\")   PainSc:   8   PainLoc: Hip     Body mass index is 23.77 kg/m².   BMI is within normal parameters. No other follow-up for BMI required.       Physical Exam  Constitutional:       Appearance: She is normal weight.   Cardiovascular:      Rate and Rhythm: Normal rate and regular rhythm.   Pulmonary:      Effort: Pulmonary effort is normal.      Breath sounds: Normal breath sounds.   Neurological:      General: No focal deficit present.   Psychiatric:         Thought Content: Thought content normal.         Judgment: Judgment normal.       Procedures    Assessment / Plan      Assessment/Plan:   Diagnoses and all orders for this visit:    1. Primary hypertension (Primary)  Assessment & Plan:  Hypertension " is improving with treatment.  Continue current treatment regimen.  Blood pressure will be reassessed at the next regular appointment.    Orders:  -     TSH  -     T4, Free  -     amLODIPine (NORVASC) 10 MG tablet; Take 1 tablet by mouth Daily.  Dispense: 90 tablet; Refill: 1  -     lisinopril (PRINIVIL,ZESTRIL) 30 MG tablet; Take 1 tablet by mouth Every 12 (Twelve) Hours.  Dispense: 180 tablet; Refill: 1  -     nebivolol (BYSTOLIC) 5 MG tablet; Take 1 tablet by mouth Daily.  Dispense: 90 tablet; Refill: 1    2. Hyperlipidemia, unspecified hyperlipidemia type  Assessment & Plan:  Continue atorvastatin and check a lipid level.    Orders:  -     Lipid Panel  -     atorvastatin (LIPITOR) 20 MG tablet; Take 1 tablet by mouth Daily.  Dispense: 90 tablet; Refill: 1    3. Generalized anxiety disorder  Assessment & Plan:  Psychological condition is improving with treatment.  Continue current treatment regimen.  Psychological condition  will be reassessed at the next regular appointment.    Orders:  -     escitalopram (LEXAPRO) 10 MG tablet; Take 1 tablet by mouth Daily.  Dispense: 90 tablet; Refill: 1    4. Recurrent major depressive disorder, in remission  Assessment & Plan:  Patient's depression is recurrent and is moderate without psychosis. Their depression is currently in full remission and the condition is improving with treatment. This will be reassessed at the next regular appointment. F/U as described:patient will continue current medication therapy.    Orders:  -     escitalopram (LEXAPRO) 10 MG tablet; Take 1 tablet by mouth Daily.  Dispense: 90 tablet; Refill: 1    5. Peripheral arterial disease with history of revascularization  Assessment & Plan:  Continue with risk factor modification.  Sugars well controlled, we will see what her cholesterol is running, she takes an aspirin daily and she stopped smoking 5 years ago.      6. Encounter for screening mammogram for malignant neoplasm of breast  -     Mammo  Screening Bilateral With CAD; Future    7. Personal history of tobacco use  -      CT Chest Low Dose Cancer Screening WO; Future    8. Menopause  -     DEXA Bone Density Axial; Future    9. High risk medication use  -     Comprehensive Metabolic Panel  -     Vitamin B12  -     Folate  -     CK  -     CBC Auto Differential    10. High vitamin D level  -     Vitamin D,25-Hydroxy    11. Need for hepatitis C screening test  -     HCV Antibody Rfx To Qnt PCR    12. Screening for diabetes mellitus    13. Screening for colon cancer  -     Cologuard - Stool, Per Rectum; Future    14. Encounter for immunization  -     Zoster Vac Recomb Adjuvanted 50 MCG/0.5ML reconstituted suspension; Inject 0.5 mL into the appropriate muscle as directed by prescriber Every 2 (Two) Months.  Dispense: 0.5 mL; Refill: 1    15. Simple chronic bronchitis  Assessment & Plan:  Continue Spiriva.    Orders:  -     tiotropium bromide monohydrate (Spiriva Respimat) 2.5 MCG/ACT aerosol solution inhaler; Inhale 2 puffs Daily.  Dispense: 12 g; Refill: 1    16. History of fractured pelvis  Assessment & Plan:  Fell July 7, 2023- seeing orthopedics.      17. Moderate persistent asthma without complication  Assessment & Plan:  Says she has both COPD and asthma.  She did quit smoking 2018.  She is currently doing okay on her Spiriva.           Medications:     Current Outpatient Medications:     amLODIPine (NORVASC) 10 MG tablet, Take 1 tablet by mouth Daily., Disp: 90 tablet, Rfl: 1    aspirin 81 MG EC tablet, Take 1 tablet by mouth Daily., Disp: , Rfl:     atorvastatin (LIPITOR) 20 MG tablet, Take 1 tablet by mouth Daily., Disp: 90 tablet, Rfl: 1    escitalopram (LEXAPRO) 10 MG tablet, Take 1 tablet by mouth Daily., Disp: 90 tablet, Rfl: 1    HYDROcodone-acetaminophen (NORCO) 5-325 MG per tablet, Take  by mouth As Needed., Disp: , Rfl:     lisinopril (PRINIVIL,ZESTRIL) 30 MG tablet, Take 1 tablet by mouth Every 12 (Twelve) Hours., Disp: 180 tablet, Rfl: 1     nebivolol (BYSTOLIC) 5 MG tablet, Take 1 tablet by mouth Daily., Disp: 90 tablet, Rfl: 1    tiotropium bromide monohydrate (Spiriva Respimat) 2.5 MCG/ACT aerosol solution inhaler, Inhale 2 puffs Daily., Disp: 12 g, Rfl: 1    Zoster Vac Recomb Adjuvanted 50 MCG/0.5ML reconstituted suspension, Inject 0.5 mL into the appropriate muscle as directed by prescriber Every 2 (Two) Months., Disp: 0.5 mL, Rfl: 1        Follow Up:   Return in about 6 weeks (around 9/8/2023) for Medicare Wellness.    Yomaira Saldana PA-C   [facility]

## 2024-01-12 ENCOUNTER — OFFICE VISIT (OUTPATIENT)
Dept: FAMILY MEDICINE CLINIC | Facility: CLINIC | Age: 69
End: 2024-01-12
Payer: MEDICARE

## 2024-01-12 VITALS
WEIGHT: 138 LBS | BODY MASS INDEX: 23.56 KG/M2 | HEART RATE: 71 BPM | DIASTOLIC BLOOD PRESSURE: 76 MMHG | RESPIRATION RATE: 15 BRPM | OXYGEN SATURATION: 97 % | SYSTOLIC BLOOD PRESSURE: 124 MMHG | HEIGHT: 64 IN

## 2024-01-12 DIAGNOSIS — M47.816 LUMBAR SPONDYLOSIS: ICD-10-CM

## 2024-01-12 DIAGNOSIS — R07.81 RIB PAIN ON RIGHT SIDE: Primary | ICD-10-CM

## 2024-01-12 DIAGNOSIS — M54.16 LUMBAR RADICULOPATHY: ICD-10-CM

## 2024-01-12 DIAGNOSIS — J41.0 SIMPLE CHRONIC BRONCHITIS: ICD-10-CM

## 2024-01-12 DIAGNOSIS — M25.562 ACUTE PAIN OF LEFT KNEE: ICD-10-CM

## 2024-01-12 DIAGNOSIS — G89.29 CHRONIC BILATERAL LOW BACK PAIN WITH LEFT-SIDED SCIATICA: ICD-10-CM

## 2024-01-12 DIAGNOSIS — M54.42 CHRONIC BILATERAL LOW BACK PAIN WITH LEFT-SIDED SCIATICA: ICD-10-CM

## 2024-01-12 PROCEDURE — 3074F SYST BP LT 130 MM HG: CPT | Performed by: PHYSICIAN ASSISTANT

## 2024-01-12 PROCEDURE — 3078F DIAST BP <80 MM HG: CPT | Performed by: PHYSICIAN ASSISTANT

## 2024-01-12 PROCEDURE — 99214 OFFICE O/P EST MOD 30 MIN: CPT | Performed by: PHYSICIAN ASSISTANT

## 2024-01-12 RX ORDER — ALBUTEROL SULFATE 90 UG/1
2 AEROSOL, METERED RESPIRATORY (INHALATION) EVERY 4 HOURS PRN
Qty: 10.8 G | Refills: 0 | Status: SHIPPED | OUTPATIENT
Start: 2024-01-12

## 2024-01-12 NOTE — PROGRESS NOTES
Patient Office Visit      Patient Name: Orin Munoz  : 1955   MRN: [id number]     Chief Complaint:    Chief Complaint   Patient presents with    Rib Pain    Knee Pain    Chronic back pain       History of Present Illness: Orin Munoz is a 68 y.o. female who is here today for injury sustained from a fall 4 days ago.  She attributes to her feet not working right because of the sciatica.  Pain in right ribs and pain left medial knee.  She is walking with a walker.  She is interested in referral to our local pain management provider.  She had an MRI of the lumbar spine done with [facility] 2023 showing multilevel spondylotic changes most pronounced at L4-L5 and L5-S1.     Subjective      Review of Systems:         Past Medical History:   Past Medical History:   Diagnosis Date    Alcoholism     Allergic     Penicillin,alupent    Arthritis 15 years ago    Asthma 20 years ago    Cataract     COPD (chronic obstructive pulmonary disease) 2015    Coronary artery disease 2018    Acute pulmonary hemmorage of the abdomen    Hyperlipidemia 2015    Hypertension 2000    Low back pain N/A    Osteopenia 2018    Pneumonia     Hospitalized for 4 days.    Renal insufficiency     Visual impairment        Past Surgical History:   Past Surgical History:   Procedure Laterality Date    APPENDECTOMY      CHOLECYSTECTOMY      COLONOSCOPY  2016    EYE SURGERY  2020    Cataract    HYSTERECTOMY  18 years old       Family History:   Family History   Problem Relation Age of Onset    Arthritis Mother     COPD Mother     Heart disease Mother     Hyperlipidemia Mother     Diabetes Maternal Grandmother     Alcohol abuse Brother         Passed away in     Drug abuse Brother     Cancer Brother         Retnal cancer    Early death Brother         Retinal cancer    Heart disease Sister        Social History:   Social History     Socioeconomic History    Marital status: Unknown   Tobacco Use    Smoking  "status: Former     Packs/day: 1.00     Years: 30.00     Additional pack years: 0.00     Total pack years: 30.00     Types: Cigarettes     Start date:      Quit date: 2018     Years since quittin.6    Smokeless tobacco: Never   Vaping Use    Vaping Use: Never used   Substance and Sexual Activity    Alcohol use: Not Currently    Drug use: Not Currently     Types: Marijuana    Sexual activity: Not Currently     Partners: Male     Birth control/protection: Condom, Birth control pill, Hysterectomy       Allergies:   Allergies   Allergen Reactions    Penicillins Hives    Metaproterenol Other (See Comments)    Other Other (See Comments)     Alupent    Ipratropium Bromide Anxiety       Objective     Physical Exam:  Vital Signs:   Vitals:    24 1409   BP: 124/76   BP Location: Right arm   Patient Position: Sitting   Cuff Size: Adult   Pulse: 71   Resp: 15   SpO2: 97%   Weight: 62.6 kg (138 lb)   Height: 162.6 cm (64\")     Body mass index is 23.69 kg/m².   BMI is within normal parameters. No other follow-up for BMI required.       Physical Exam  Cardiovascular:      Rate and Rhythm: Normal rate and regular rhythm.   Pulmonary:      Breath sounds: Normal breath sounds.      Comments: Painful respiration.  Tenderness right inferior anterolateral ribs.  Musculoskeletal:      Left knee: Bony tenderness present. No swelling. Normal range of motion. No LCL laxity, MCL laxity, ACL laxity or PCL laxity.     Comments: Very tender right medial knee near the tibial plateau.   Neurological:      Mental Status: She is alert.         Procedures    Assessment / Plan      Assessment/Plan:   Diagnoses and all orders for this visit:    1. Rib pain on right side (Primary)  Assessment & Plan:  Prescribed a rescue inhaler, continue with Spiriva and get rib x-rays.  I want patient to start acetaminophen 1000 mg 3 times daily on a scheduled basis.  She cannot take NSAIDs due to chronic kidney disease.    Orders:  -     XR Ribs " Right With PA Chest; Future  -     albuterol sulfate  (90 Base) MCG/ACT inhaler; Inhale 2 puffs Every 4 (Four) Hours As Needed for Wheezing.  Dispense: 10.8 g; Refill: 0    2. Acute pain of left knee  Assessment & Plan:  Will get left knee x-ray.  Start acetaminophen 1000 mg 3 times daily on a scheduled basis.  Continue to avoid NSAIDs due to stage IIIb chronic kidney disease.  Refer to orthopedics.    Orders:  -     XR Knee 3 View Left; Future  -     Ambulatory Referral to Orthopedic Surgery    3. Lumbar spondylosis  -     Ambulatory Referral to [facility] Management [facility]    4. Lumbar radiculopathy  -     Ambulatory Referral to Pain Management Clinic    5. Chronic bilateral low back pain with left-sided sciatica  Assessment & Plan:  Refer to pain management.  She is interested in epidural steroid injections.    Orders:  -     Ambulatory Referral to Pain Management Clinic    6. Simple chronic bronchitis  -     albuterol sulfate  (90 Base) MCG/ACT inhaler; Inhale 2 puffs Every 4 (Four) Hours As Needed for Wheezing.  Dispense: 10.8 g; Refill: 0           Medications:     Current Outpatient Medications:     amLODIPine (NORVASC) 10 MG tablet, Take 1 tablet by mouth Daily., Disp: 90 tablet, Rfl: 1    aspirin 81 MG EC tablet, Take 1 tablet by mouth Daily., Disp: , Rfl:     atorvastatin (LIPITOR) 40 MG tablet, Take 1 tablet by mouth Daily. Cancel rx just sent for 20 mg and cancel any remaining refills for 20 mg., Disp: 90 tablet, Rfl: 1    escitalopram (LEXAPRO) 10 MG tablet, Take 1 tablet by mouth Daily., Disp: 90 tablet, Rfl: 1    lisinopril (PRINIVIL,ZESTRIL) 30 MG tablet, Take 1 tablet by mouth Every 12 (Twelve) Hours., Disp: 180 tablet, Rfl: 1    nebivolol (BYSTOLIC) 5 MG tablet, Take 1 tablet by mouth Daily., Disp: 90 tablet, Rfl: 1    pregabalin (LYRICA) 25 MG capsule, Take 1 capsule by mouth 2 (Two) Times a Day., Disp: , Rfl:     tiotropium bromide monohydrate (Spiriva Respimat) 2.5 MCG/ACT aerosol  solution inhaler, Inhale 2 puffs Daily., Disp: 12 g, Rfl: 1    albuterol sulfate  (90 Base) MCG/ACT inhaler, Inhale 2 puffs Every 4 (Four) Hours As Needed for Wheezing., Disp: 10.8 g, Rfl: 0    I spent 30 minutes caring for Orin on this date of service. This time includes time spent by me in the following activities:preparing for the visit, reviewing tests, obtaining and/or reviewing a separately obtained history, performing a medically appropriate examination and/or evaluation , counseling and educating the patient/family/caregiver, ordering medications, tests, or procedures, referring and communicating with other health care professionals , and documenting information in the medical record    Follow Up:   No follow-ups on file.    Yomaira Saldana PA-C   [facility]     NOTE TO PATIENT: The 21st Century Cures Act makes medical notes like these available to patients in the interest of transparency. However, be advised this is a medical document. It is intended as peer to peer communication. It is written in medical language and may contain abbreviations or verbiage that are unfamiliar. It may appear blunt or direct. Medical documents are intended to carry relevant information, facts as evident, and the clinical opinion of the practitioner.

## 2024-01-12 NOTE — ASSESSMENT & PLAN NOTE
Will get left knee x-ray.  Start acetaminophen 1000 mg 3 times daily on a scheduled basis.  Continue to avoid NSAIDs due to stage IIIb chronic kidney disease.  Refer to orthopedics.

## 2024-01-12 NOTE — ASSESSMENT & PLAN NOTE
Prescribed a rescue inhaler, continue with Spiriva and get rib x-rays.  I want patient to start acetaminophen 1000 mg 3 times daily on a scheduled basis.  She cannot take NSAIDs due to chronic kidney disease.

## 2024-01-29 ENCOUNTER — TELEPHONE (OUTPATIENT)
Dept: PAIN MEDICINE | Facility: CLINIC | Age: 69
End: 2024-01-29
Payer: MEDICARE

## 2024-01-29 NOTE — TELEPHONE ENCOUNTER
Spoke with the patient, she was away from home, but said she would call tomorrow 1/30/2024 to r/s her appointment with Pain Management Yina to 2/9/2024.

## 2024-02-06 ENCOUNTER — OFFICE VISIT (OUTPATIENT)
Dept: ORTHOPEDIC SURGERY | Facility: CLINIC | Age: 69
End: 2024-02-06
Payer: MEDICARE

## 2024-02-06 VITALS
BODY MASS INDEX: 23.56 KG/M2 | WEIGHT: 138.01 LBS | HEIGHT: 64 IN | SYSTOLIC BLOOD PRESSURE: 134 MMHG | DIASTOLIC BLOOD PRESSURE: 82 MMHG

## 2024-02-06 DIAGNOSIS — M11.20 CHONDROCALCINOSIS: ICD-10-CM

## 2024-02-06 DIAGNOSIS — M17.12 PRIMARY OSTEOARTHRITIS OF LEFT KNEE: ICD-10-CM

## 2024-02-06 DIAGNOSIS — M54.16 LUMBAR RADICULOPATHY: ICD-10-CM

## 2024-02-06 DIAGNOSIS — M25.562 LEFT KNEE PAIN, UNSPECIFIED CHRONICITY: Primary | ICD-10-CM

## 2024-02-06 NOTE — PROGRESS NOTES
[facility] Office Visit     Office Visit       Date: 02/06/2024   Patient Name: Orin Munoz  MRN: [id number]  YOB: 1955    Referring Physician: Yomaira Saldana PA     Chief Complaint:   Chief Complaint   Patient presents with    Left Knee - Pain       History of Present Illness:   Orin Munoz is a 68 y.o. female who presents with left knee pain for 7 month(s). Onset mechanical fall. Pain is localized to the medial joint line and is a 6/10 on the pain scale. Pain is described as aching. Associated symptoms include pain and stiffness. The pain is worse with walking and standing; ice make it better. Previous treatments have included: cane/walker and physical therapy since symptom onset. Although some transient relief was reported with these interventions, these conservative measures have failed and symptoms have persisted. The patient is limited in daily activities and has had a significant decrease in quality of life as a result. She denies fevers, chills, or constitutional symptoms.    Subjective   Review of Systems: Review of Systems   Constitutional:  Negative for chills, fever, unexpected weight gain and unexpected weight loss.   HENT:  Negative for congestion, postnasal drip and rhinorrhea.    Eyes:  Negative for blurred vision.   Respiratory:  Negative for shortness of breath.    Cardiovascular:  Negative for leg swelling.   Gastrointestinal:  Negative for abdominal pain, nausea and vomiting.   Genitourinary:  Negative for difficulty urinating.   Musculoskeletal:  Positive for arthralgias. Negative for gait problem, joint swelling and myalgias.   Skin:  Negative for skin lesions and wound.   Neurological:  Negative for dizziness, weakness, light-headedness and numbness.   Hematological:  Does not bruise/bleed easily.   Psychiatric/Behavioral:  Negative for depressed mood.         I have reviewed the following portions of the patient's  history:History of Present Illness and review of systems.    Past Medical History:   Past Medical History:   Diagnosis Date    Alcoholism     Allergic     Penicillin,alupent    Ankle sprain     N/A    Arthritis 15 years ago    Arthritis of back Yrs.ago    N/A    Arthritis of neck Last year    Asthma 20 years ago    Cataract     COPD (chronic obstructive pulmonary disease) 2015    Coronary artery disease 2018    Acute pulmonary hemmorage of the abdomen    Fracture, foot 2018    Had surgery    Hip arthrosis Years ago    Hyperlipidemia 2015    Hypertension 2000    Knee sprain     Knee swelling A long time    Low back pain N/A    Low back strain Last few years    Was told i had osteo    Osteopenia 2018    Periarthritis of shoulder Last year    Pneumonia     Hospitalized for 4 days.    Renal insufficiency     Scoliosis Last year    Visual impairment     Wrist sprain        Past Surgical History:   Past Surgical History:   Procedure Laterality Date    APPENDECTOMY      CHOLECYSTECTOMY      COLONOSCOPY      EYE SURGERY      Cataract    FOOT SURGERY       done surgery    HYSTERECTOMY  18 years old       Family History:   Family History   Problem Relation Age of Onset    Arthritis Mother     COPD Mother     Heart disease Mother     Hyperlipidemia Mother     Diabetes Maternal Grandmother     Osteoporosis Maternal Grandmother         In lower back    Alcohol abuse Brother         Passed away in     Drug abuse Brother     Cancer Brother         Retnal cancer    Early death Brother         Retinal cancer    Heart disease Sister        Social History:   Social History     Socioeconomic History    Marital status: Unknown   Tobacco Use    Smoking status: Former     Packs/day: 1.00     Years: 30.00     Additional pack years: 0.00     Total pack years: 30.00     Types: Cigarettes     Start date:      Quit date: 2018     Years since quittin.7    Smokeless tobacco:  "Never   Vaping Use    Vaping Use: Never used   Substance and Sexual Activity    Alcohol use: Yes     Alcohol/week: 3.0 standard drinks of alcohol     Types: 3 Shots of liquor per week    Drug use: Not Currently     Types: Marijuana    Sexual activity: Not Currently     Partners: Male     Birth control/protection: Condom, Birth control pill, Hysterectomy       Medications:   Current Outpatient Medications:     albuterol sulfate  (90 Base) MCG/ACT inhaler, Inhale 2 puffs Every 4 (Four) Hours As Needed for Wheezing., Disp: 10.8 g, Rfl: 0    amLODIPine (NORVASC) 10 MG tablet, Take 1 tablet by mouth Daily., Disp: 90 tablet, Rfl: 1    aspirin 81 MG EC tablet, Take 1 tablet by mouth Daily., Disp: , Rfl:     atorvastatin (LIPITOR) 40 MG tablet, Take 1 tablet by mouth Daily. Cancel rx just sent for 20 mg and cancel any remaining refills for 20 mg., Disp: 90 tablet, Rfl: 1    escitalopram (LEXAPRO) 10 MG tablet, Take 1 tablet by mouth Daily., Disp: 90 tablet, Rfl: 1    lisinopril (PRINIVIL,ZESTRIL) 30 MG tablet, Take 1 tablet by mouth Every 12 (Twelve) Hours., Disp: 180 tablet, Rfl: 1    nebivolol (BYSTOLIC) 5 MG tablet, Take 1 tablet by mouth Daily., Disp: 90 tablet, Rfl: 1    pregabalin (LYRICA) 25 MG capsule, Take 1 capsule by mouth 2 (Two) Times a Day., Disp: , Rfl:     tiotropium bromide monohydrate (Spiriva Respimat) 2.5 MCG/ACT aerosol solution inhaler, Inhale 2 puffs Daily., Disp: 12 g, Rfl: 1    Allergies:   Allergies   Allergen Reactions    Penicillins Hives    Metaproterenol Other (See Comments)    Other Other (See Comments)     Alupent    Ipratropium Bromide Anxiety       I reviewed the patient's chief complaint, history of present illness, review of systems, past medical history, surgical history, family history, social history, medications and allergy list.     Objective    Vital Signs:   Vitals:    02/06/24 0820   BP: 134/82   Weight: 62.6 kg (138 lb 0.1 oz)   Height: 162.6 cm (64.02\")     Body mass " index is 23.68 kg/m².   BMI is within normal parameters. No other follow-up for BMI required.     Patient reports that she is a former smoker. She quit smoking in 2018.  She has not resumed smoking since that time.  This behavior was applauded and she was encouraged to continue in smoking cessation.  We will continue to monitor at subsequent visits.    Ortho Exam:  Left knee: No erythema, ecchymosis, swelling.  Tender to palpation over the medial joint line.  Full range of motion in flexion extension but pain is experienced with deep knee flexion.  She can get to 0 degrees extension, 130 degrees in flexion.  Stable to varus and valgus stress.  Negative anterior posterior drawer.  Negative Glenys's.  Sensation intact to light touch.  5/5 strength.  2+ posterior tibial pulse.    Results Review:   Imaging Results (Last 24 Hours)       Procedure Component Value Units Date/Time    XR Knee 4+ View Left [[id number]] Resulted: 02/06/24 0803     Updated: 02/06/24 0811        I personally reviewed and interpreted the radiographs of the left knee.  No acute fracture or dislocation.  Mild degenerative changes are noted in all 3 compartments of the knee.  There is also chondrocalcinosis of the menisci, particularly the lateral meniscus of the left knee.    Procedures    Assessment / Plan    Assessment/Plan:   Diagnoses and all orders for this visit:    1. Left knee pain, unspecified chronicity (Primary)  -     XR Knee 4+ View Left    2. Primary osteoarthritis of left knee    3. Lumbar radiculopathy    4. Chondrocalcinosis    Left knee pain ongoing for the last several months after a fall in June 2023.  She fell striking her knee and had significant pain especially on the medial joint line immediately thereafter.  Symptoms gradually improved over time with ice, activity modification, and Tylenol.  She has occasional recurrence of symptoms in the knee along that medial joint line.  Ever, her most significant pain begins in the  midline of her low back and runs down the posterior aspect of her left leg affecting her knee.  She has had previous foot surgeries that have affected her back as well.  On exam today, she has full range of motion and is ligamentously stable.  Her radiographs do show mild degenerative changes throughout the knee as well as chondrocalcinosis of her menisci.  But again, this is not significantly affecting her.  I recommended that in her to physical therapy that she focus more on her low back.  She has been referred to see Dr. Lima in this office in the coming days and I believe that will give her the most benefit for her current symptoms.  If she begins to have significant knee pain, swelling, reduced range of motion, difficulty walking due to pain in the knee, I am happy to see her back for additional management otherwise, follow-up will be as needed.    Previous documentation reviewed: 1/12/2024-office visit-GABRIELLA Escobar.    Previous imaging studies reviewed: 1/16/2024-radiographs of the left knee nonweightbearing.  2/6/2024-radiographs of the left knee weightbearing.    Previous laboratory results reviewed: 12/8/2023-creatinine 1.45, EGFR 39.    Follow Up:   No follow-ups on file.      Ramesh Larios MD  [facility]

## 2024-02-09 ENCOUNTER — OFFICE VISIT (OUTPATIENT)
Dept: PAIN MEDICINE | Facility: CLINIC | Age: 69
End: 2024-02-09
Payer: MEDICARE

## 2024-02-09 VITALS — HEIGHT: 64 IN | WEIGHT: 143.2 LBS | BODY MASS INDEX: 24.45 KG/M2

## 2024-02-09 DIAGNOSIS — M54.16 LUMBAR RADICULOPATHY: Primary | ICD-10-CM

## 2024-02-09 DIAGNOSIS — M47.817 LUMBOSACRAL SPONDYLOSIS WITHOUT MYELOPATHY: ICD-10-CM

## 2024-02-09 DIAGNOSIS — G62.9 PERIPHERAL POLYNEUROPATHY: ICD-10-CM

## 2024-02-09 PROCEDURE — 1125F AMNT PAIN NOTED PAIN PRSNT: CPT | Performed by: STUDENT IN AN ORGANIZED HEALTH CARE EDUCATION/TRAINING PROGRAM

## 2024-02-09 PROCEDURE — 1159F MED LIST DOCD IN RCRD: CPT | Performed by: STUDENT IN AN ORGANIZED HEALTH CARE EDUCATION/TRAINING PROGRAM

## 2024-02-09 PROCEDURE — 1160F RVW MEDS BY RX/DR IN RCRD: CPT | Performed by: STUDENT IN AN ORGANIZED HEALTH CARE EDUCATION/TRAINING PROGRAM

## 2024-02-09 PROCEDURE — 99204 OFFICE O/P NEW MOD 45 MIN: CPT | Performed by: STUDENT IN AN ORGANIZED HEALTH CARE EDUCATION/TRAINING PROGRAM

## 2024-02-09 NOTE — PROGRESS NOTES
Referring Physician: Yomaira Saldana PA  [address] [place] [address],  [place] [address]    Primary Physician: Yomaira Saldana PA    CHIEF COMPLAINT or REASON FOR VISIT: Back Pain (New patient)      Initial history of present illness on 02/09/2024:  Ms. Orin Munoz is 68 y.o. female who presents as a new patient referral for evaluation and treatment of chronic low back pain with radiation to bilateral lower extremities and bilateral foot numbness tingling and pain.  She does have past medical history of peripheral vascular disease; apparently has had some kind of lower extremity revascularization and left foot surgery in 2018.  Primary pain complaint today is bilateral axial low back pain which does from time to time radiate to bilateral lower extremities.  Patient denies any bowel or bladder dysfunction, lower extremity weakness, new onset saddle anesthesia or unexplained weight loss.     She does have past medical history of heavy EtOH abuse; drinks much less now but continues to drink infrequently per patient report.  Denies diabetes.  She has tried NSAIDs, acetaminophen, gabapentin and more recently pregabalin.  She is in physical therapy and has completed 6 sessions thus far.    Interval history:    Interventions:      Objective Pain Scoring:   BRIEF PAIN INVENTORY:  Total score:   Pain Score    02/09/24 1218   PainSc:   6   PainLoc: Back      PHQ-2: PHQ-2 Total Score: 3  PHQ-9: PHQ-9: Brief Depression Severity Measure Score: 5  Opioid Risk Tool:         Review of Systems:   ROS negative except as otherwise noted     Past Medical History:   Past Medical History:   Diagnosis Date    Alcoholism     Allergic 2023    Penicillin,alupent    Ankle sprain     N/A    Arthritis 15 years ago    Arthritis of back Yrs.ago    N/A    Arthritis of neck Last year    Asthma 20 years ago    Cataract 2021    Chronic pain disorder     COPD (chronic obstructive pulmonary disease) 2015    Coronary artery disease 2018    Acute pulmonary hemmorage  of the abdomen    Fracture, foot 2018    Had surgery    Hip arthrosis Years ago    Hyperlipidemia 2015    Hypertension 2000    Knee sprain     Knee swelling A long time    Low back pain N/A    Low back strain Last few years    Was told i had osteo    Osteopenia     Osteoporosis     Periarthritis of shoulder Last year    Peripheral neuropathy     Pneumonia     Hospitalized for 4 days.    Renal insufficiency     Scoliosis Last year    Shingles 2009    Visual impairment     Wrist sprain          Past Surgical History:   Past Surgical History:   Procedure Laterality Date    APPENDECTOMY  1971    CHOLECYSTECTOMY      COLONOSCOPY  2016    EYE SURGERY      Cataract    FOOT SURGERY       done surgery    HYSTERECTOMY  18 years old         Family History   Family History   Problem Relation Age of Onset    Arthritis Mother     COPD Mother     Heart disease Mother     Hyperlipidemia Mother     Diabetes Maternal Grandmother     Osteoporosis Maternal Grandmother         In lower back    Alcohol abuse Brother         Passed away in     Drug abuse Brother     Cancer Brother         Retnal cancer    Early death Brother         Retinal cancer    Heart disease Sister          Social History   Social History     Socioeconomic History    Marital status: Unknown   Tobacco Use    Smoking status: Former     Packs/day: 1.00     Years: 30.00     Additional pack years: 0.00     Total pack years: 30.00     Types: Cigarettes     Start date:      Quit date: 2018     Years since quittin.7    Smokeless tobacco: Never   Vaping Use    Vaping Use: Never used   Substance and Sexual Activity    Alcohol use: Yes     Alcohol/week: 3.0 standard drinks of alcohol     Types: 3 Shots of liquor per week    Drug use: Not Currently     Types: Marijuana    Sexual activity: Not Currently     Partners: Male     Birth control/protection: Condom, Birth control pill, Hysterectomy        Medications:  "    Current Outpatient Medications:     albuterol sulfate  (90 Base) MCG/ACT inhaler, Inhale 2 puffs Every 4 (Four) Hours As Needed for Wheezing., Disp: 10.8 g, Rfl: 0    amLODIPine (NORVASC) 10 MG tablet, Take 1 tablet by mouth Daily., Disp: 90 tablet, Rfl: 1    aspirin 81 MG EC tablet, Take 1 tablet by mouth Daily., Disp: , Rfl:     atorvastatin (LIPITOR) 40 MG tablet, Take 1 tablet by mouth Daily. Cancel rx just sent for 20 mg and cancel any remaining refills for 20 mg., Disp: 90 tablet, Rfl: 1    escitalopram (LEXAPRO) 10 MG tablet, Take 1 tablet by mouth Daily., Disp: 90 tablet, Rfl: 1    lisinopril (PRINIVIL,ZESTRIL) 30 MG tablet, Take 1 tablet by mouth Every 12 (Twelve) Hours., Disp: 180 tablet, Rfl: 1    nebivolol (BYSTOLIC) 5 MG tablet, Take 1 tablet by mouth Daily., Disp: 90 tablet, Rfl: 1    pregabalin (LYRICA) 25 MG capsule, Take 1 capsule by mouth 2 (Two) Times a Day., Disp: , Rfl:     tiotropium bromide monohydrate (Spiriva Respimat) 2.5 MCG/ACT aerosol solution inhaler, Inhale 2 puffs Daily., Disp: 12 g, Rfl: 1        Physical Exam:     Vitals:    02/09/24 1218   Weight: 65 kg (143 lb 3.2 oz)   Height: 162.6 cm (64\")   PainSc:   6   PainLoc: Back        General: Alert and oriented, No acute distress.   HEENT: Normocephalic, atraumatic.   Cardiovascular: No gross edema  Respiratory: Respirations are non-labored    Lumbar Spine:   No masses or atrophy  Range of motion - Flexion normal. Extension reduced  Facet Loading: Positive bilaterally  Facet Palpation -tender bilaterally  PSIS tenderness - Negative bilaterally  Venkat's/RADHA/Thigh thrust -   Straight leg raise/slump test: Negative bilaterally      Motor Exam:    Strength: Rate on 1-5 scale Right Left    L1/2- hip flexion 5/5  5/5    L3- knee extension 5/5  5/5    L4- ankle dorsiflexion 5/5  5/5    L5- great toe extension 5/5  5/5    S1- ankle plantarflexion 5/5  5/5    Sensory Exam: Reduced sensation to light touch stocking " distribution    Neurologic: Cranial Nerves II-XII are grossly intact.   Psychiatric: Cooperative.   Gait: Normal   Assistive Devices: None    Imaging Studies:   No results found for this or any previous visit.        Independent review of radiographic imaging:     Impression & Plan:       02/09/2024: Orin Munoz is a 68 y.o. female with past medical history significant for COPD, EtOH abuse, HTN, HLD, CASSIA, peripheral arterial disease with history of revascularization, CKD 3 who presents to the pain clinic for evaluation and treatment of chronic axial low back pain, bilateral lower extremity pain numbness and tingling.  I do not have images for personal interpretation today.  Lumbar MRI report indicated multilevel facet spondylosis and neuroforaminal stenosis.  Examination consistent with lumbar radiculopathy, peripheral neuropathy, lumbar facet spondylosis.  We discussed epidural steroid injection to improve pain.  If greater than 50% relief for at least 2-3 months can consider repeat as needed every 3 to 4 months.  I had a discussion with the patient regarding the risks of the procedure including bleeding, infection, damage to surrounding structures.  We discussed the potential adverse effects of corticosteroid injection including flushing of the face, lipodystrophy, skin discoloration, elevated blood glucose, increased blood pressure.  Risks of frequent steroid administration include weight gain, hormonal changes, mood changes, osteoporosis.  Can consider LMBB/RFA for persistent axial back pain.    1. Lumbar radiculopathy    2. Lumbosacral spondylosis without myelopathy    3. Peripheral polyneuropathy        PLAN:  1. Medication Recommendations: Recommend Voltaren topical, NSAIDs, Tylenol.  Can trial turmeric 500 mg twice daily if NSAID contraindicated.  Agree with pregabalin.    2. Physical Therapy: Continue PT    3. Psychological: defer    4. Complementary and alternative (CAM) Therapies:     5. Labs/Diagnostic  studies: Obtain BLE EMG/NCV with Dr. Harper    6. Imaging: MRI report reviewed    7. Interventions: Schedule bilateral L4/L5 and L5/S1 transforaminal epidural steroid injection.  Consider LMBB/RFA.  Consider SCS for neuropathic pain.    8. Referrals: None indicated     9. Records: n/a    10. Lifestyle goals:    Follow-up 2 months      [facility]  Sahil Lima MD

## 2024-02-12 DIAGNOSIS — M47.817 LUMBOSACRAL SPONDYLOSIS WITHOUT MYELOPATHY: ICD-10-CM

## 2024-02-12 DIAGNOSIS — M54.16 LUMBAR RADICULOPATHY: Primary | ICD-10-CM

## 2024-02-12 DIAGNOSIS — M54.16 LUMBAR RADICULOPATHY: ICD-10-CM

## 2024-02-12 DIAGNOSIS — G62.9 PERIPHERAL POLYNEUROPATHY: Primary | ICD-10-CM

## 2024-02-14 ENCOUNTER — DOCUMENTATION (OUTPATIENT)
Dept: PAIN MEDICINE | Facility: CLINIC | Age: 69
End: 2024-02-14

## 2024-02-14 ENCOUNTER — OUTSIDE FACILITY SERVICE (OUTPATIENT)
Dept: PAIN MEDICINE | Facility: CLINIC | Age: 69
End: 2024-02-14
Payer: MEDICARE

## 2024-04-19 ENCOUNTER — OFFICE VISIT (OUTPATIENT)
Dept: PAIN MEDICINE | Facility: CLINIC | Age: 69
End: 2024-04-19
Payer: MEDICARE

## 2024-04-19 VITALS — HEIGHT: 64 IN | BODY MASS INDEX: 24.43 KG/M2 | WEIGHT: 143.1 LBS

## 2024-04-19 DIAGNOSIS — M47.817 LUMBOSACRAL SPONDYLOSIS WITHOUT MYELOPATHY: Primary | ICD-10-CM

## 2024-04-19 DIAGNOSIS — G62.9 PERIPHERAL POLYNEUROPATHY: ICD-10-CM

## 2024-04-19 DIAGNOSIS — M54.16 LUMBAR RADICULOPATHY: ICD-10-CM

## 2024-04-19 NOTE — PROGRESS NOTES
Referring Physician: No referring provider defined for this encounter.    Primary Physician: Yomaira Saldana PA    CHIEF COMPLAINT or REASON FOR VISIT: Follow-up and Back Pain      Initial history of present illness on 02/09/2024:  Ms. Orin Munoz is 68 y.o. female who presents as a new patient referral for evaluation and treatment of chronic low back pain with radiation to bilateral lower extremities and bilateral foot numbness tingling and pain.  She does have past medical history of peripheral vascular disease; apparently has had some kind of lower extremity revascularization and left foot surgery in 2018.  Primary pain complaint today is bilateral axial low back pain which does from time to time radiate to bilateral lower extremities.  Patient denies any bowel or bladder dysfunction, lower extremity weakness, new onset saddle anesthesia or unexplained weight loss.     She does have past medical history of heavy EtOH abuse; drinks much less now but continues to drink infrequently per patient report.  Denies diabetes.  She has tried NSAIDs, acetaminophen, gabapentin and more recently pregabalin.  She is in physical therapy and has completed 6 sessions thus far.    Interval history: Patient returns to clinic after undergoing bilateral transforaminal epidural steroid injections.  She reports very minimal pain relief from this procedure.  Today she primarily complains of chronic bilateral low back pain.  This pain will occasionally radiate down her left lower extremity ending approximately at her left knee.  While this radicular pain is bothersome, her most significant complaint is bilateral low back pain.  She does have associated bilateral foot numbness tingling and pain.  We discussed a variety of different interventional treatment options at today's appointment including LMBB/RFA and spinal cord stimulator.  She is hesitant to consider spinal cord stimulation at this time.  We can consider spinal cord  stimulator trial if patient would like if minimal or transient benefit to LMBB/RFA  EMG from 2/13/2024 reveals left L5/S1 radiculopathy, left tibial and fibular motor neuropathy at the ankle, left distal sural nerve demyelination.    Interventions:  2/14/2024: Bilateral L4/5 and L5/S1 TFESI minimal pain relief    Objective Pain Scoring:   BRIEF PAIN INVENTORY:  Total score:   Pain Score    04/19/24 1109   PainSc:   6   PainLoc: Back        PHQ-2: PHQ-2 Total Score: 0  PHQ-9: PHQ-9: Brief Depression Severity Measure Score: 0  Opioid Risk Tool:         Review of Systems:   ROS negative except as otherwise noted     Past Medical History:   Past Medical History:   Diagnosis Date    Alcoholism     Allergic 2023    Penicillin,alupent    Ankle sprain     N/A    Arthritis 15 years ago    Arthritis of back Yrs.ago    N/A    Arthritis of neck Last year    Asthma 20 years ago    Cataract 2021    Chronic pain disorder     COPD (chronic obstructive pulmonary disease) 2015    Coronary artery disease 2018    Acute pulmonary hemmorage of the abdomen    Fracture, foot 2018    Had surgery    Hip arthrosis Years ago    Hyperlipidemia 2015    Hypertension 2000    Knee sprain 2023    Knee swelling A long time    Low back pain N/A    Low back strain Last few years    Was told i had osteo    Osteopenia 2018    Osteoporosis     Periarthritis of shoulder Last year    Peripheral neuropathy     Pneumonia 2022    Hospitalized for 4 days.    Renal insufficiency 2022    Scoliosis Last year    Shingles 2009    Visual impairment 1958    Wrist sprain 2018         Past Surgical History:   Past Surgical History:   Procedure Laterality Date    APPENDECTOMY  1971    CHOLECYSTECTOMY  2015    COLONOSCOPY  2016    EYE SURGERY  2020    Cataract    FOOT SURGERY  2018     done surgery    HYSTERECTOMY  18 years old         Family History   Family History   Problem Relation Age of Onset    Arthritis Mother     COPD Mother     Heart disease Mother      Hyperlipidemia Mother     Diabetes Maternal Grandmother     Osteoporosis Maternal Grandmother         In lower back    Alcohol abuse Brother         Passed away in     Drug abuse Brother     Cancer Brother         Retnal cancer    Early death Brother         Retinal cancer    Heart disease Sister          Social History   Social History     Socioeconomic History    Marital status: Unknown   Tobacco Use    Smoking status: Former     Current packs/day: 0.00     Average packs/day: 1 pack/day for 30.3 years (30.3 ttl pk-yrs)     Types: Cigarettes     Start date: 1988     Quit date: 2018     Years since quittin.9    Smokeless tobacco: Never   Vaping Use    Vaping status: Never Used   Substance and Sexual Activity    Alcohol use: Yes     Alcohol/week: 3.0 standard drinks of alcohol     Types: 3 Shots of liquor per week    Drug use: Not Currently     Types: Marijuana    Sexual activity: Not Currently     Partners: Male     Birth control/protection: Condom, Birth control pill, Hysterectomy        Medications:     Current Outpatient Medications:     albuterol sulfate  (90 Base) MCG/ACT inhaler, Inhale 2 puffs Every 4 (Four) Hours As Needed for Wheezing., Disp: 10.8 g, Rfl: 0    amLODIPine (NORVASC) 10 MG tablet, Take 1 tablet by mouth Daily., Disp: 90 tablet, Rfl: 1    aspirin 81 MG EC tablet, Take 1 tablet by mouth Daily., Disp: , Rfl:     atorvastatin (LIPITOR) 40 MG tablet, Take 1 tablet by mouth Daily. Cancel rx just sent for 20 mg and cancel any remaining refills for 20 mg., Disp: 90 tablet, Rfl: 1    escitalopram (LEXAPRO) 10 MG tablet, Take 1 tablet by mouth Daily., Disp: 90 tablet, Rfl: 1    lisinopril (PRINIVIL,ZESTRIL) 30 MG tablet, Take 1 tablet by mouth Every 12 (Twelve) Hours., Disp: 180 tablet, Rfl: 1    nebivolol (BYSTOLIC) 5 MG tablet, Take 1 tablet by mouth Daily., Disp: 90 tablet, Rfl: 1    tiotropium bromide monohydrate (Spiriva Respimat) 2.5 MCG/ACT aerosol solution inhaler, Inhale  "2 puffs Daily., Disp: 12 g, Rfl: 1        Physical Exam:     Vitals:    04/19/24 1109   Weight: 64.9 kg (143 lb 1.6 oz)   Height: 162.6 cm (64.02\")   PainSc:   6   PainLoc: Back          General: Alert and oriented, No acute distress.   HEENT: Normocephalic, atraumatic.   Cardiovascular: No gross edema  Respiratory: Respirations are non-labored    Lumbar Spine:   No masses or atrophy  Range of motion - Flexion normal. Extension reduced  Facet Loading: Positive bilaterally  Facet Palpation -tender bilaterally  PSIS tenderness - Negative bilaterally  Venkat's/RADHA/Thigh thrust -   Straight leg raise/slump test: Negative bilaterally      Motor Exam:    Strength: Rate on 1-5 scale Right Left    L1/2- hip flexion 5/5  5/5    L3- knee extension 5/5  5/5    L4- ankle dorsiflexion 5/5  5/5    L5- great toe extension 5/5  5/5    S1- ankle plantarflexion 5/5  5/5    Sensory Exam: Reduced sensation to light touch stocking distribution    Neurologic: Cranial Nerves II-XII are grossly intact.   Psychiatric: Cooperative.   Gait: Normal   Assistive Devices: None    Physical exam is consistent and accurate as of 4/19/2024    Imaging Studies:   No results found for this or any previous visit.        Independent review of radiographic imaging:     Impression & Plan:       02/09/2024: Orin Munoz is a 68 y.o. female with past medical history significant for COPD, EtOH abuse, HTN, HLD, CASSIA, peripheral arterial disease with history of revascularization, CKD 3 who presents to the pain clinic for evaluation and treatment of chronic axial low back pain, bilateral lower extremity pain numbness and tingling.  I do not have images for personal interpretation today.  Lumbar MRI report indicated multilevel facet spondylosis and neuroforaminal stenosis.  Examination consistent with lumbar radiculopathy, peripheral neuropathy, lumbar facet spondylosis.  We discussed epidural steroid injection to improve pain.  If greater than 50% relief " for at least 2-3 months can consider repeat as needed every 3 to 4 months.  I had a discussion with the patient regarding the risks of the procedure including bleeding, infection, damage to surrounding structures.  We discussed the potential adverse effects of corticosteroid injection including flushing of the face, lipodystrophy, skin discoloration, elevated blood glucose, increased blood pressure.  Risks of frequent steroid administration include weight gain, hormonal changes, mood changes, osteoporosis.  Can consider LMBB/RFA for persistent axial back pain.  4/19/2024: Minimal relief from TFESI.  Will plan for bilateral L4/5 and L5/S1 LMBB and subsequent ablation if appropriate.  Risk and benefits of this procedure were discussed at length.  Risk include bleeding, infection, damage to surrounding structures, paralysis, and even death.  Patient voiced understanding.  Discussed SCS with patient, hesitant to undergo trial at this time.    1. Lumbosacral spondylosis without myelopathy    2. Lumbar radiculopathy    3. Peripheral polyneuropathy          PLAN:  1. Medication Recommendations: Recommend Voltaren topical, NSAIDs, Tylenol.  Can trial turmeric 500 mg twice daily if NSAID contraindicated.  Agree with pregabalin.    2. Physical Therapy: Continue PT    3. Psychological: defer    4. Complementary and alternative (CAM) Therapies:     5. Labs/Diagnostic studies: EMG reviewed    6. Imaging: MRI report reviewed    7. Interventions: Schedule bilateral L4/5 and L5/S1 lumbar medial branch block (79794, [id number]). If the first block provides greater than 80% relief we will schedule a second set of medial branch blocks.  If second set of medial branch blocks provides at least 80% relief we will schedule rhizotomy.   -Consider SCS if minimal or transient benefit    8. Referrals: None indicated     9. Records: n/a    10. Lifestyle goals:    Follow-up 4-5 months      [facility]  Gilberto Rico  ALFRED Pineda

## 2024-05-08 ENCOUNTER — OFFICE VISIT (OUTPATIENT)
Dept: FAMILY MEDICINE CLINIC | Facility: CLINIC | Age: 69
End: 2024-05-08
Payer: MEDICARE

## 2024-05-08 VITALS
BODY MASS INDEX: 24.41 KG/M2 | RESPIRATION RATE: 15 BRPM | WEIGHT: 143 LBS | OXYGEN SATURATION: 93 % | HEART RATE: 84 BPM | DIASTOLIC BLOOD PRESSURE: 78 MMHG | SYSTOLIC BLOOD PRESSURE: 126 MMHG | HEIGHT: 64 IN

## 2024-05-08 DIAGNOSIS — M54.16 LUMBAR RADICULOPATHY: Primary | ICD-10-CM

## 2024-05-08 DIAGNOSIS — N18.32 STAGE 3B CHRONIC KIDNEY DISEASE: ICD-10-CM

## 2024-05-08 DIAGNOSIS — E79.0 HYPERURICEMIA: ICD-10-CM

## 2024-05-08 DIAGNOSIS — I71.60 THORACOABDOMINAL AORTIC ANEURYSM (TAAA) WITHOUT RUPTURE, UNSPECIFIED PART: ICD-10-CM

## 2024-05-08 DIAGNOSIS — E78.2 MIXED HYPERLIPIDEMIA: ICD-10-CM

## 2024-05-08 DIAGNOSIS — M79.673 CHRONIC FOOT PAIN, UNSPECIFIED LATERALITY: ICD-10-CM

## 2024-05-08 DIAGNOSIS — R07.81 RIB PAIN ON RIGHT SIDE: ICD-10-CM

## 2024-05-08 DIAGNOSIS — F41.1 GENERALIZED ANXIETY DISORDER: ICD-10-CM

## 2024-05-08 DIAGNOSIS — Z98.890 PERIPHERAL ARTERIAL DISEASE WITH HISTORY OF REVASCULARIZATION: ICD-10-CM

## 2024-05-08 DIAGNOSIS — Z13.820 SCREENING FOR OSTEOPOROSIS: ICD-10-CM

## 2024-05-08 DIAGNOSIS — J41.0 SIMPLE CHRONIC BRONCHITIS: ICD-10-CM

## 2024-05-08 DIAGNOSIS — M47.816 LUMBAR SPONDYLOSIS: ICD-10-CM

## 2024-05-08 DIAGNOSIS — I10 PRIMARY HYPERTENSION: ICD-10-CM

## 2024-05-08 DIAGNOSIS — I73.9 PERIPHERAL ARTERIAL DISEASE WITH HISTORY OF REVASCULARIZATION: ICD-10-CM

## 2024-05-08 DIAGNOSIS — E78.5 HYPERLIPIDEMIA, UNSPECIFIED HYPERLIPIDEMIA TYPE: ICD-10-CM

## 2024-05-08 DIAGNOSIS — Z78.0 MENOPAUSE: ICD-10-CM

## 2024-05-08 DIAGNOSIS — F33.40 RECURRENT MAJOR DEPRESSIVE DISORDER, IN REMISSION: ICD-10-CM

## 2024-05-08 DIAGNOSIS — R01.1 MURMUR, CARDIAC: ICD-10-CM

## 2024-05-08 DIAGNOSIS — G89.29 CHRONIC FOOT PAIN, UNSPECIFIED LATERALITY: ICD-10-CM

## 2024-05-08 PROCEDURE — 1125F AMNT PAIN NOTED PAIN PRSNT: CPT | Performed by: PHYSICIAN ASSISTANT

## 2024-05-08 PROCEDURE — 93000 ELECTROCARDIOGRAM COMPLETE: CPT | Performed by: PHYSICIAN ASSISTANT

## 2024-05-08 PROCEDURE — 99215 OFFICE O/P EST HI 40 MIN: CPT | Performed by: PHYSICIAN ASSISTANT

## 2024-05-08 PROCEDURE — 3074F SYST BP LT 130 MM HG: CPT | Performed by: PHYSICIAN ASSISTANT

## 2024-05-08 PROCEDURE — 3078F DIAST BP <80 MM HG: CPT | Performed by: PHYSICIAN ASSISTANT

## 2024-05-08 PROCEDURE — G2211 COMPLEX E/M VISIT ADD ON: HCPCS | Performed by: PHYSICIAN ASSISTANT

## 2024-05-08 RX ORDER — TIOTROPIUM BROMIDE AND OLODATEROL 3.124; 2.736 UG/1; UG/1
2 SPRAY, METERED RESPIRATORY (INHALATION)
Qty: 12 G | Refills: 1 | Status: SHIPPED | OUTPATIENT
Start: 2024-05-08

## 2024-05-08 RX ORDER — NEBIVOLOL 5 MG/1
5 TABLET ORAL DAILY
Qty: 90 TABLET | Refills: 1 | Status: SHIPPED | OUTPATIENT
Start: 2024-05-08

## 2024-05-08 RX ORDER — ATORVASTATIN CALCIUM 40 MG/1
40 TABLET, FILM COATED ORAL DAILY
Qty: 90 TABLET | Refills: 1 | Status: SHIPPED | OUTPATIENT
Start: 2024-05-08

## 2024-05-08 RX ORDER — AMLODIPINE BESYLATE 10 MG/1
10 TABLET ORAL DAILY
Qty: 90 TABLET | Refills: 1 | Status: SHIPPED | OUTPATIENT
Start: 2024-05-08

## 2024-05-08 RX ORDER — LISINOPRIL 30 MG/1
30 TABLET ORAL EVERY 12 HOURS SCHEDULED
Qty: 180 TABLET | Refills: 1 | Status: SHIPPED | OUTPATIENT
Start: 2024-05-08

## 2024-05-08 RX ORDER — ESCITALOPRAM OXALATE 10 MG/1
10 TABLET ORAL DAILY
Qty: 90 TABLET | Refills: 1 | Status: SHIPPED | OUTPATIENT
Start: 2024-05-08

## 2024-05-08 RX ORDER — ALBUTEROL SULFATE 90 UG/1
2 AEROSOL, METERED RESPIRATORY (INHALATION) EVERY 4 HOURS PRN
Qty: 10.8 G | Refills: 1 | Status: SHIPPED | OUTPATIENT
Start: 2024-05-08

## 2024-05-09 ENCOUNTER — TELEPHONE (OUTPATIENT)
Dept: PAIN MEDICINE | Facility: CLINIC | Age: 69
End: 2024-05-09
Payer: MEDICARE

## 2024-05-09 LAB
BUN SERPL-MCNC: 13 MG/DL (ref 8–27)
BUN/CREAT SERPL: 10 (ref 12–28)
CALCIUM SERPL-MCNC: 9.5 MG/DL (ref 8.7–10.3)
CHLORIDE SERPL-SCNC: 100 MMOL/L (ref 96–106)
CO2 SERPL-SCNC: 24 MMOL/L (ref 20–29)
CREAT SERPL-MCNC: 1.29 MG/DL (ref 0.57–1)
EGFRCR SERPLBLD CKD-EPI 2021: 45 ML/MIN/1.73
GLUCOSE SERPL-MCNC: 130 MG/DL (ref 70–99)
POTASSIUM SERPL-SCNC: 4.5 MMOL/L (ref 3.5–5.2)
SODIUM SERPL-SCNC: 138 MMOL/L (ref 134–144)
URATE SERPL-MCNC: 9.1 MG/DL (ref 3–7.2)

## 2024-05-09 RX ORDER — ALLOPURINOL 300 MG/1
300 TABLET ORAL DAILY
Qty: 90 TABLET | Refills: 1 | Status: SHIPPED | OUTPATIENT
Start: 2024-05-09

## 2024-05-16 ENCOUNTER — OFFICE VISIT (OUTPATIENT)
Dept: CARDIOLOGY | Facility: CLINIC | Age: 69
End: 2024-05-16
Payer: MEDICARE

## 2024-05-16 ENCOUNTER — OUTSIDE FACILITY SERVICE (OUTPATIENT)
Dept: PAIN MEDICINE | Facility: CLINIC | Age: 69
End: 2024-05-16
Payer: MEDICARE

## 2024-05-16 VITALS
BODY MASS INDEX: 24.21 KG/M2 | RESPIRATION RATE: 20 BRPM | TEMPERATURE: 97.6 F | DIASTOLIC BLOOD PRESSURE: 62 MMHG | WEIGHT: 141.8 LBS | HEART RATE: 90 BPM | HEIGHT: 64 IN | SYSTOLIC BLOOD PRESSURE: 114 MMHG | OXYGEN SATURATION: 94 %

## 2024-05-16 DIAGNOSIS — I73.9 PERIPHERAL ARTERIAL DISEASE WITH HISTORY OF REVASCULARIZATION: ICD-10-CM

## 2024-05-16 DIAGNOSIS — I10 PRIMARY HYPERTENSION: ICD-10-CM

## 2024-05-16 DIAGNOSIS — R01.1 HEART MURMUR: Primary | ICD-10-CM

## 2024-05-16 DIAGNOSIS — R01.1 MURMUR, CARDIAC: ICD-10-CM

## 2024-05-16 DIAGNOSIS — Z98.890 PERIPHERAL ARTERIAL DISEASE WITH HISTORY OF REVASCULARIZATION: ICD-10-CM

## 2024-05-16 DIAGNOSIS — E78.5 HYPERLIPIDEMIA, UNSPECIFIED HYPERLIPIDEMIA TYPE: ICD-10-CM

## 2024-05-16 DIAGNOSIS — I71.60 THORACOABDOMINAL AORTIC ANEURYSM (TAAA) WITHOUT RUPTURE, UNSPECIFIED PART: ICD-10-CM

## 2024-05-16 DIAGNOSIS — I25.10 CORONARY ARTERY CALCIFICATION SEEN ON CT SCAN: ICD-10-CM

## 2024-05-16 DIAGNOSIS — E78.2 MIXED HYPERLIPIDEMIA: ICD-10-CM

## 2024-05-16 DIAGNOSIS — R94.31 ABNORMAL EKG: ICD-10-CM

## 2024-05-16 RX ORDER — ATORVASTATIN CALCIUM 80 MG/1
80 TABLET, FILM COATED ORAL DAILY
Qty: 90 TABLET | Refills: 3 | Status: SHIPPED | OUTPATIENT
Start: 2024-05-16

## 2024-05-16 RX ORDER — ICOSAPENT ETHYL 1 G/1
2 CAPSULE ORAL 2 TIMES DAILY WITH MEALS
Qty: 120 CAPSULE | Refills: 11 | Status: SHIPPED | OUTPATIENT
Start: 2024-05-16

## 2024-05-16 NOTE — ASSESSMENT & PLAN NOTE
Heavy calcification seen.  EKG abnormal.  No chest pain but limited exercise capacity with left lower extremity cramping and shooting pain. Plan:  Refer patient for Lexiscan nuclear stress test  Risk factors modifications as above

## 2024-05-16 NOTE — PATIENT INSTRUCTIONS
[facility]  [facility]  [address] [place] [address]  Dept: [phone]  Dept Fax: [phone]    Date:   Patient: Orin Munoz    Blood Pressure Log  Provided By: Cornelio Rollins MD    Date Blood Pressure Heart Rate Comments   Friday May 17, 2024       Saturday May 18, 2024       Abdullahi May 19, 2024       Monday May 20, 2024       Tuesday May 21, 2024       Wednesday May 22, 2024       Thursday May 23, 2024       Friday May 24, 2024       Saturday May 25, 2024       Abdullahi May 26, 2024       Monday May 27, 2024       Tuesday May 28, 2024       Wednesday May 29, 2024       Thursday May 30, 2024       Friday May 31, 2024       Saturday June 1, 2024       Abdullahi June 2, 2024       Monday Chantell 3, 2024       Tuesday June 4, 2024       Wednesday June 5, 2024       Thursday June 6, 2024       Friday June 7, 2024       Saturday June 8, 2024       Abdullahi June 9, 2024       Monday Chantell 10, 2024       Tuesday June 11, 2024       Wednesday June 12, 2024       Thursday June 13, 2024       Friday June 14, 2024       Saturday Chantell 15, 2024       Abdullahi June 16, 2024       Monday June 17, 2024       Tuesday June 18, 2024       Wednesday June 19, 2024       Thursday June 20, 2024       Friday June 21, 2024       Saturday June 22, 2024       Abdullahi June 23, 2024       Monday June 24, 2024       Tuesday June 25, 2024       Wednesday June 26, 2024       Thursday June 27, 2024       Friday June 28, 2024       Saturday June 29, 2024       Abdullahi June 30, 2024       Monday July 1, 2024       Tuesday July 2, 2024       Wednesday July 3, 2024       Thursday July 4, 2024       Friday July 5, 2024       Saturday July 6, 2024       Abdullahi July 7, 2024       Monday July 8, 2024       Tuesday July 9, 2024       Wednesday July 10, 2024       Thursday July 11, 2024       Friday July 12, 2024       Saturday July 13, 2024       Abdullahi July 14, 2024

## 2024-05-16 NOTE — ASSESSMENT & PLAN NOTE
Seen on low-dose CT chest and MRI.  Ascending thoracic aorta 3.8 cm and abdominal aorta 3.3 cm/4.1 cm distally.  Follow-up with noncontrast CT in 1 to 2 years in view of baseline CKD.  Can consider follow-up MRA chest-abdomen without contrast.

## 2024-05-16 NOTE — ASSESSMENT & PLAN NOTE
Refer patient for stress testing in view of ST depression inferior and anterior leads suggesting of ischemia, in addition to findings of severe coronary calcifications on chest CT.

## 2024-05-16 NOTE — ASSESSMENT & PLAN NOTE
Hypertension is stable and controlled  Continue current treatment regimen.  Dietary sodium restriction.  Regular aerobic exercise.  Ambulatory blood pressure monitoring.  Blood pressure will be reassessed in 3 months.

## 2024-05-16 NOTE — PROGRESS NOTES
[facility]  [facility] CARDIOLOGY  [address] [place] [address]  Dept: [phone]  Dept Fax: [phone]    Date: 05/16/2024  Patient: Orin Munoz  YOB: 1955    New Patient Office Note    Consult Reason:  Ms. Orin Munoz is a 68 y.o. female who presents to the clinic to [facility] care, seen for Hypertension, Aortic Aneurysm, and Heart Murmur.   Doing fine today with no complaints.  Patient wants to switch all of her providers to [facility].  Patient complaining of left lower extremity cramping when she walks, shooting pain from the back down the foot, relieved by rest.  Patient denies angina, orthopnea, PND, palpitations, lightheadedness, syncope or medications side-effects.    The following portions of the patient's history were reviewed and updated as appropriate: allergies, current medications, past family history, past medical history, past social history, past surgical history, and problem list.    Medications:   Allergies   Allergen Reactions    Penicillins Hives    Metaproterenol Other (See Comments)    Other Other (See Comments)     Alupent    Ipratropium Bromide Anxiety      Current Outpatient Medications   Medication Instructions    albuterol sulfate  (90 Base) MCG/ACT inhaler 2 puffs, Inhalation, Every 4 Hours PRN    allopurinol (ZYLOPRIM) 300 mg, Oral, Daily    amLODIPine (NORVASC) 10 mg, Oral, Daily    aspirin 81 mg, Oral, Daily    atorvastatin (LIPITOR) 80 mg, Oral, Daily    escitalopram (LEXAPRO) 10 mg, Oral, Daily    icosapent ethyl (VASCEPA) 2 g, Oral, 2 Times Daily With Meals    lisinopril (PRINIVIL,ZESTRIL) 30 mg, Oral, Every 12 Hours Scheduled    nebivolol (BYSTOLIC) 5 mg, Oral, Daily    tiotropium bromide monohydrate (Spiriva Respimat) 2.5 MCG/ACT aerosol solution inhaler 2 puffs, Inhalation, Daily - RT    tiotropium bromide-olodaterol (Stiolto Respimat) 2.5-2.5 MCG/ACT aerosol solution inhaler 2 puffs, Inhalation, Daily - RT        Subjective  Past Medical History:   Diagnosis Date    Alcoholism     Allergic     Penicillin,alupent    Ankle sprain     N/A    Arthritis 15 years ago    Arthritis of back Yrs.ago    N/A    Arthritis of neck Last year    Asthma 20 years ago    Cataract     Chronic pain disorder     COPD (chronic obstructive pulmonary disease) 2015    Coronary artery disease 2018    Acute pulmonary hemmorage of the abdomen    Fracture, foot 2018    Had surgery    Hip arthrosis Years ago    Hyperlipidemia 2015    Hypertension 2000    Knee sprain     Knee swelling A long time    Low back pain N/A    Low back strain Last few years    Was told i had osteo    Osteopenia 2018    Osteoporosis     Periarthritis of shoulder Last year    Peripheral neuropathy     Pneumonia     Hospitalized for 4 days.    Renal insufficiency     Scoliosis Last year    Shingles 2009    Visual impairment     Wrist sprain        Past Surgical History:   Procedure Laterality Date    APPENDECTOMY  1971    CHOLECYSTECTOMY      COLONOSCOPY  2016    EYE SURGERY      Cataract    FOOT SURGERY       done surgery    HYSTERECTOMY  18 years old       Family History   Problem Relation Age of Onset    Arthritis Mother     COPD Mother     Heart disease Mother     Hyperlipidemia Mother     Diabetes Maternal Grandmother     Osteoporosis Maternal Grandmother         In lower back    Alcohol abuse Brother         Passed away in     Drug abuse Brother     Cancer Brother         Retnal cancer    Early death Brother         Retinal cancer    Heart disease Sister         Social History     Socioeconomic History    Marital status: Unknown   Tobacco Use    Smoking status: Former     Current packs/day: 0.00     Average packs/day: 1 pack/day for 30.3 years (30.3 ttl pk-yrs)     Types: Cigarettes     Start date: 1988     Quit date: 2018     Years since quittin.0    Smokeless tobacco: Never   Vaping Use    Vaping status:  "Never Used   Substance and Sexual Activity    Alcohol use: Yes     Alcohol/week: 3.0 standard drinks of alcohol     Types: 3 Shots of liquor per week    Drug use: Not Currently     Types: Marijuana    Sexual activity: Not Currently     Partners: Male     Birth control/protection: Condom, Birth control pill, Hysterectomy       Objective  Vitals:    05/16/24 1133   BP: 114/62   Pulse: 90   Resp: 20   Temp: 97.6 °F (36.4 °C)   SpO2: 94%   Weight: 64.3 kg (141 lb 12.8 oz)   Height: 162.6 cm (64.02\")   PainSc: 0-No pain     Vitals:    05/16/24 1133   BP: 114/62   Pulse: 90   Resp: 20   Temp: 97.6 °F (36.4 °C)   SpO2: 94%   Weight: 64.3 kg (141 lb 12.8 oz)   Height: 162.6 cm (64.02\")        Physical Exam  Constitutional:       Appearance: Healthy appearance. Not in distress.   Eyes:      Pupils: Pupils are equal, round, and reactive to light.   HENT:    Mouth/Throat:      Mouth: Mucous membranes are moist.   Neck:      Vascular: No carotid bruit, hepatojugular reflux, JVD or JVR. JVD normal.   Pulmonary:      Effort: Pulmonary effort is normal.      Breath sounds: Normal breath sounds. No wheezing. No rhonchi. No rales.   Chest:      Chest wall: Not tender to palpatation.   Cardiovascular:      PMI at left midclavicular line. Normal rate. Regular rhythm. Normal S1 with normal intensity. Normal S2 with normal intensity.       Murmurs: There is a grade 2/6 harsh midsystolic murmur at the URSB, radiating to the neck.      No gallop.  No click. No rub.   Pulses:     Dorsalis pedis: 1+ bilaterally.     Posterior tibial: 1+ bilaterally.  Edema:     Peripheral edema absent.   Abdominal:      General: There is no abdominal bruit.   Skin:     General: Skin is warm.   Neurological:      Mental Status: Alert and oriented to person, place and time.              Labs:  Lab Results   Component Value Date     05/08/2024    K 4.5 05/08/2024     05/08/2024    CO2 24 05/08/2024    BUN 13 05/08/2024    CREATININE 1.29 (H) " "05/08/2024    CALCIUM 9.5 05/08/2024    BILITOT 0.3 12/08/2023    ALKPHOS 69 12/08/2023    ALT 18 12/08/2023    AST 29 12/08/2023    GLUCOSE 130 (H) 05/08/2024    ALBUMIN 4.7 12/08/2023     Lab Results   Component Value Date    WBC 5.7 12/08/2023    HGB 13.3 12/08/2023    HCT 39.5 12/08/2023     12/08/2023     No results found for: \"APTT\", \"INR\", \"PTT\"  Lab Results   Component Value Date    CKTOTAL 99 12/08/2023    CKTOTAL 82 07/28/2023     No results found for: \"BNP\", \"PROBNP\"    Lab Results   Component Value Date    CHLPL 211 (H) 12/08/2023    TRIG 165 (H) 12/08/2023    HDL 60 12/08/2023     (H) 12/08/2023     Lab Results   Component Value Date    TSH 1.500 07/28/2023    FREET4 1.17 07/28/2023       The 10-year ASCVD risk score (Jv DK, et al., 2019) is: 10.1%    Values used to calculate the score:      Age: 68 years      Sex: Female      Is Non- : No      Diabetic: No      Tobacco smoker: No      Systolic Blood Pressure: 114 mmHg      Is BP treated: Yes      HDL Cholesterol: 60 mg/dL      Total Cholesterol: 211 mg/dL     [facility] Diagnostics:    ECG 12 Lead    Date/Time: 5/16/2024 12:01 PM  Performed by: Cornelio Rollins MD    Authorized by: Cornelio Rollins MD  Comparison: compared with previous ECG from 5/8/2024  Similar to previous ECG  Rhythm: sinus rhythm  ST Depression: II, III, aVF, V3, V4 and V5  Comments: Normal sinus rhythm with ST depression inferior and anterior leads suggesting ischemia          CXR: No results found for this or any previous visit.     ECHO/MUGA: No results found for this or any previous visit.     STRESS TESTS: No results found for this or any previous visit.     CARDIAC CATH: No results found for this or any previous visit.     DEVICES: No valid procedures specified.   HOLTER: No results found for this or any previous visit.     CT/MRI:  No results found for this or any previous visit.    VASCULAR: No valid procedures specified.     Assessment and " Plan  Diagnoses and all orders for this visit:    1. Heart murmur (Primary)  Assessment & Plan:  Suggesting arctic stenosis.  Order transthoracic echocardiogram.    Orders:  -     Adult Transthoracic Echo Complete W/ Cont if Necessary Per Protocol; Future    2. Primary hypertension  Assessment & Plan:  Hypertension is stable and controlled  Continue current treatment regimen.  Dietary sodium restriction.  Regular aerobic exercise.  Ambulatory blood pressure monitoring.  Blood pressure will be reassessed in 3 months.      3. Mixed hyperlipidemia  Assessment & Plan:   Lipid abnormalities are improving with treatment    Plan:  Plan dosage change to the following medication/s;  atorvastatin 80 mg p.o. daily.      Discussed medication dosage, use, side effects, and goals of treatment in detail.    Counseled patient on lifestyle modifications to help control hyperlipidemia.   Cholesterol lowering dietary information shared with patient.  Advised patient to exercise for 150 minutes weekly. (30 minute brisk walk, 5 days a week for example)  Add Vascepa 2 g twice daily.    Patient Treatment Goals:   LDL goal is less than 70    Followup in 3 months.      4. Peripheral arterial disease with history of revascularization  Assessment & Plan:  Followed by Dr. Alicia.  PVD seen prior stenting left femoral/iliac 6 years ago.  Last workup showing:  US duplex of BLE 3/2022  50-69% suggested narrowing in both CFA  30-59% narrowing in the BLE SFA.   Patient with clinical claudication left lower extremity.  Risk factors modification: Blood pressure to goal, lipid-lowering therapy adjusted today.  Follow-up with Dr. Alicia.  Consider cilostazol for claudication if no plans for revascularization.  Order GERALD next visit.        5. Coronary artery calcification seen on CT scan  Assessment & Plan:  Heavy calcification seen.  EKG abnormal.  No chest pain but limited exercise capacity with left lower extremity cramping and shooting pain.  Plan:  Refer patient for Lexiscan nuclear stress test  Risk factors modifications as above    Orders:  -     ECG 12 Lead    6. Murmur, cardiac  Assessment & Plan:  Suggesting arctic stenosis.  Order transthoracic echocardiogram.      7. 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  Ascending thoracic aorta 3.8 cm and abdominal aorta 3.3 cm/4.1 cm distally.  Follow-up with noncontrast CT in 1 to 2 years in view of baseline CKD.  Can consider follow-up MRA chest-abdomen without contrast.      8. Hyperlipidemia, unspecified hyperlipidemia type  Assessment & Plan:   Lipid abnormalities are improving with treatment    Plan:  Plan dosage change to the following medication/s;  atorvastatin 80 mg p.o. daily.      Discussed medication dosage, use, side effects, and goals of treatment in detail.    Counseled patient on lifestyle modifications to help control hyperlipidemia.   Cholesterol lowering dietary information shared with patient.  Advised patient to exercise for 150 minutes weekly. (30 minute brisk walk, 5 days a week for example)  Add Vascepa 2 g twice daily.    Patient Treatment Goals:   LDL goal is less than 70    Followup in 3 months.    Orders:  -     atorvastatin (LIPITOR) 80 MG tablet; Take 1 tablet by mouth Daily.  Dispense: 90 tablet; Refill: 3  -     icosapent ethyl (Vascepa) 1 g capsule capsule; Take 2 g by mouth 2 (Two) Times a Day With Meals.  Dispense: 120 capsule; Refill: 11    9. Abnormal EKG  Assessment & Plan:  Refer patient for stress testing in view of ST depression inferior and anterior leads suggesting of ischemia, in addition to findings of severe coronary calcifications on chest CT.    Orders:  -     Stress Test With Myocardial Perfusion One Day; Future         Return in about 3 months (around 8/16/2024).    Patient Instructions   [facility] CARDIOLOGY  [address] [place] [address]  Dept: [phone]  Dept Fax: [phone]    Date:   Patient: Orin Munoz    Blood Pressure Log  Provided By: Cornelio Rollins MD    Date Blood Pressure Heart Rate Comments   Friday May 17, 2024       Saturday May 18, 2024       Abdullahi May 19, 2024       Monday May 20, 2024       Tuesday May 21, 2024       Wednesday May 22, 2024       Thursday May 23, 2024       Friday May 24, 2024       Saturday May 25, 2024       Abdullahi May 26, 2024       Monday May 27, 2024       Tuesday May 28, 2024       Wednesday May 29, 2024       Thursday May 30, 2024       Friday May 31, 2024       Saturday June 1, 2024       Abdullahi June 2, 2024       Monday Chantell 3, 2024       Tuesday June 4, 2024       Wednesday June 5, 2024       Thursday June 6, 2024       Friday June 7, 2024       Saturday June 8, 2024       Abdullahi June 9, 2024       Monday Chantell 10, 2024       Tuesday June 11, 2024       Wednesday June 12, 2024       Thursday June 13, 2024       Friday June 14, 2024       Saturday Chantell 15, 2024       Abdullahi June 16, 2024       Monday June 17, 2024       Tuesday June 18, 2024       Wednesday June 19, 2024       Thursday June 20, 2024       Friday June 21, 2024       Saturday June 22, 2024       Abdullahi June 23, 2024       Monday June 24, 2024       Tuesday June 25, 2024       Wednesday June 26, 2024       Thursday June 27, 2024       Friday June 28, 2024       Saturday June 29, 2024       Abdullahi June 30, 2024       Monday July 1, 2024       Tuesday July 2, 2024       Wednesday July 3, 2024       Thursday July 4, 2024       Friday July 5, 2024       Saturday July 6, 2024       Abdullahi July 7, 2024       Monday July 8, 2024       Tuesday July 9, 2024       Wednesday July 10, 2024       Thursday July 11, 2024       Friday July 12, 2024       Saturday July 13, 2024       Abdullahi July 14, 2024            Cornelio Rollins MD

## 2024-05-16 NOTE — ASSESSMENT & PLAN NOTE
Lipid abnormalities are improving with treatment    Plan:  Plan dosage change to the following medication/s;  atorvastatin 80 mg p.o. daily.      Discussed medication dosage, use, side effects, and goals of treatment in detail.    Counseled patient on lifestyle modifications to help control hyperlipidemia.   Cholesterol lowering dietary information shared with patient.  Advised patient to exercise for 150 minutes weekly. (30 minute brisk walk, 5 days a week for example)  Add Vascepa 2 g twice daily.    Patient Treatment Goals:   LDL goal is less than 70    Followup in 3 months.

## 2024-05-16 NOTE — ASSESSMENT & PLAN NOTE
Followed by Dr. Alicia.  PVD seen prior stenting left femoral/iliac 6 years ago.  Last workup showing:  US duplex of BLE 3/2022  50-69% suggested narrowing in both CFA  30-59% narrowing in the BLE SFA.   Patient with clinical claudication left lower extremity.  Risk factors modification: Blood pressure to goal, lipid-lowering therapy adjusted today.  Follow-up with Dr. Alicia.  Consider cilostazol for claudication if no plans for revascularization.  Order GERALD next visit.

## 2024-05-22 ENCOUNTER — TELEPHONE (OUTPATIENT)
Dept: CARDIOLOGY | Facility: CLINIC | Age: 69
End: 2024-05-22
Payer: MEDICARE

## 2024-05-22 ENCOUNTER — PATIENT ROUNDING (BHMG ONLY) (OUTPATIENT)
Dept: CARDIOLOGY | Facility: CLINIC | Age: 69
End: 2024-05-22
Payer: MEDICARE

## 2024-05-22 NOTE — PROGRESS NOTES
May 22, 2024    Hello, may I speak with Orin Munoz?    My name is TENZIN    I am  with [facility]  [facility] CARDIOLOGY  [address] [place] [address].    Before we get started may I verify your date of birth? 1955    I am calling to officially welcome you to our practice and ask about your recent visit. Is this a good time to talk? YES    Tell me about your visit with us. What things went well? REALLY LIKED DOCTOR- HELPFUL, CONCERNED       We're always looking for ways to make our patients' experiences even better. Do you have recommendations on ways we may improve?  no       Thank you, and have a great day.

## 2024-05-22 NOTE — TELEPHONE ENCOUNTER
ECHO scheduled at [facility] 5/28/24 2:00, 1:45 arrival. Patient contacted.

## 2024-05-28 ENCOUNTER — OUTSIDE FACILITY SERVICE (OUTPATIENT)
Dept: CARDIOLOGY | Facility: CLINIC | Age: 69
End: 2024-05-28
Payer: MEDICARE

## 2024-05-28 PROCEDURE — 93306 TTE W/DOPPLER COMPLETE: CPT | Performed by: INTERNAL MEDICINE

## 2024-05-30 ENCOUNTER — TELEPHONE (OUTPATIENT)
Dept: CARDIOLOGY | Facility: CLINIC | Age: 69
End: 2024-05-30

## 2024-05-30 NOTE — TELEPHONE ENCOUNTER
Spoke with Ms. Munoz and advised her per Dr. Rollins : Please inform patient that her lexiscan nuclear stress test was normal. Thank you!   Pt verbalized understanding.

## 2024-06-03 ENCOUNTER — TELEPHONE (OUTPATIENT)
Dept: FAMILY MEDICINE CLINIC | Facility: CLINIC | Age: 69
End: 2024-06-03
Payer: MEDICARE

## 2024-06-03 ENCOUNTER — TELEPHONE (OUTPATIENT)
Dept: CARDIOLOGY | Facility: CLINIC | Age: 69
End: 2024-06-03
Payer: MEDICARE

## 2024-06-03 NOTE — TELEPHONE ENCOUNTER
Cornelio Rollins MD  [facility]  Please inform patient that her transthoracic echocardiogram was normal for age with mild stiffening of the walls of the heart.  Thank you!    Pt understand message

## 2024-06-18 ENCOUNTER — OFFICE VISIT (OUTPATIENT)
Dept: FAMILY MEDICINE CLINIC | Facility: CLINIC | Age: 69
End: 2024-06-18
Payer: MEDICARE

## 2024-06-18 VITALS
BODY MASS INDEX: 24.59 KG/M2 | DIASTOLIC BLOOD PRESSURE: 78 MMHG | HEIGHT: 64 IN | RESPIRATION RATE: 15 BRPM | WEIGHT: 144 LBS | SYSTOLIC BLOOD PRESSURE: 120 MMHG

## 2024-06-18 DIAGNOSIS — I73.9 CLAUDICATION OF LEFT LOWER EXTREMITY: ICD-10-CM

## 2024-06-18 DIAGNOSIS — Z98.890 PERIPHERAL ARTERIAL DISEASE WITH HISTORY OF REVASCULARIZATION: ICD-10-CM

## 2024-06-18 DIAGNOSIS — I10 PRIMARY HYPERTENSION: ICD-10-CM

## 2024-06-18 DIAGNOSIS — I73.9 PERIPHERAL ARTERIAL DISEASE WITH HISTORY OF REVASCULARIZATION: ICD-10-CM

## 2024-06-18 DIAGNOSIS — M81.0 AGE-RELATED OSTEOPOROSIS WITHOUT CURRENT PATHOLOGICAL FRACTURE: Primary | ICD-10-CM

## 2024-06-18 DIAGNOSIS — N18.32 STAGE 3B CHRONIC KIDNEY DISEASE: ICD-10-CM

## 2024-06-18 PROBLEM — M10.9 GOUT: Status: ACTIVE | Noted: 2024-06-18

## 2024-06-18 PROBLEM — Z98.42 HISTORY OF BILATERAL CATARACT EXTRACTION: Status: ACTIVE | Noted: 2022-11-07

## 2024-06-18 PROBLEM — Z90.49 HISTORY OF CHOLECYSTECTOMY: Status: ACTIVE | Noted: 2022-11-07

## 2024-06-18 PROBLEM — R91.8 MULTIPLE NODULES OF LUNG: Status: ACTIVE | Noted: 2018-05-24

## 2024-06-18 PROBLEM — G62.9 NEUROPATHY: Status: ACTIVE | Noted: 2018-05-25

## 2024-06-18 PROBLEM — Z90.710 HISTORY OF TOTAL HYSTERECTOMY: Status: ACTIVE | Noted: 2022-11-07

## 2024-06-18 PROBLEM — Z98.41 HISTORY OF BILATERAL CATARACT EXTRACTION: Status: ACTIVE | Noted: 2022-11-07

## 2024-06-18 PROCEDURE — 1170F FXNL STATUS ASSESSED: CPT | Performed by: PHYSICIAN ASSISTANT

## 2024-06-18 PROCEDURE — 3074F SYST BP LT 130 MM HG: CPT | Performed by: PHYSICIAN ASSISTANT

## 2024-06-18 PROCEDURE — 99215 OFFICE O/P EST HI 40 MIN: CPT | Performed by: PHYSICIAN ASSISTANT

## 2024-06-18 PROCEDURE — G2211 COMPLEX E/M VISIT ADD ON: HCPCS | Performed by: PHYSICIAN ASSISTANT

## 2024-06-18 PROCEDURE — 1126F AMNT PAIN NOTED NONE PRSNT: CPT | Performed by: PHYSICIAN ASSISTANT

## 2024-06-18 PROCEDURE — 3078F DIAST BP <80 MM HG: CPT | Performed by: PHYSICIAN ASSISTANT

## 2024-06-18 RX ORDER — RISEDRONATE SODIUM 150 MG/1
150 TABLET, FILM COATED ORAL
Qty: 3 TABLET | Refills: 4 | Status: SHIPPED | OUTPATIENT
Start: 2024-06-18

## 2024-06-18 NOTE — ASSESSMENT & PLAN NOTE
Start monthly Actonel.  Discussed with patient how the medication works to improve bone density and decrease fracture risk.  Discussed appropriate administration of medication and potential side effects.

## 2024-06-18 NOTE — PROGRESS NOTES
Patient Office Visit      Patient Name: Orin Munoz  : 1955   MRN: [id number]     Chief Complaint:    Chief Complaint   Patient presents with    Osteoporosis    Claudication       History of Present Illness: Orin Munoz is a 69 y.o. female who is here today to review bone density scan results.  Scan shows osteoporosis with a 7% decrease in bone density since last scan.  Also she continues of ongoing pain left leg and foot worse with exertion with left foot staying cold.    Subjective      Review of Systems:         Past Medical History:   Past Medical History:   Diagnosis Date    Alcoholism     Allergic     Penicillin,alupent    Ankle sprain     N/A    Arthritis 15 years ago    Arthritis of back Yrs.ago    N/A    Arthritis of neck Last year    Asthma 20 years ago    Cataract     Chronic pain disorder     COPD (chronic obstructive pulmonary disease) 2015    Coronary artery disease 2018    Acute pulmonary hemmorage of the abdomen    Fracture, foot 2018    Had surgery    Hip arthrosis Years ago    Hyperlipidemia 2015    Hypertension 2000    Knee sprain     Knee swelling A long time    Low back pain N/A    Low back strain Last few years    Was told i had osteo    Osteopenia 2018    Osteoporosis     Periarthritis of shoulder Last year    Peripheral neuropathy     Pneumonia     Hospitalized for 4 days.    Renal insufficiency     Scoliosis Last year    Shingles 2009    Visual impairment     Wrist sprain 2018       Past Surgical History:   Past Surgical History:   Procedure Laterality Date    APPENDECTOMY  1971    CHOLECYSTECTOMY      COLONOSCOPY  2016    EYE SURGERY      Cataract    FOOT SURGERY       done surgery    HYSTERECTOMY  18 years old       Family History:   Family History   Problem Relation Age of Onset    Arthritis Mother     COPD Mother     Heart disease Mother     Hyperlipidemia Mother     Diabetes Maternal Grandmother     Osteoporosis Maternal  "Grandmother         In lower back    Alcohol abuse Brother         Passed away in     Drug abuse Brother     Cancer Brother         Retnal cancer    Early death Brother         Retinal cancer    Heart disease Sister        Social History:   Social History     Socioeconomic History    Marital status: Unknown   Tobacco Use    Smoking status: Former     Current packs/day: 0.00     Average packs/day: 1 pack/day for 30.3 years (30.3 ttl pk-yrs)     Types: Cigarettes     Start date: 1988     Quit date: 2018     Years since quittin.1    Smokeless tobacco: Never   Vaping Use    Vaping status: Never Used   Substance and Sexual Activity    Alcohol use: Yes     Alcohol/week: 3.0 standard drinks of alcohol     Types: 3 Shots of liquor per week    Drug use: Not Currently     Types: Marijuana    Sexual activity: Not Currently     Partners: Male     Birth control/protection: Condom, Birth control pill, Hysterectomy       Allergies:   Allergies   Allergen Reactions    Penicillins Hives    Metaproterenol Other (See Comments)    Other Other (See Comments)     Alupent    Ipratropium Bromide Anxiety       Objective     Physical Exam:  Vital Signs:   Vitals:    24 1104   BP: 120/78   BP Location: Right arm   Patient Position: Sitting   Cuff Size: Adult   Resp: 15   Weight: 65.3 kg (144 lb)   Height: 162.6 cm (64\")     Body mass index is 24.72 kg/m².   BMI is within normal parameters. No other follow-up for BMI required.       Physical Exam  Cardiovascular:      Pulses:           Dorsalis pedis pulses are detected w/ Doppler on the right side and 0 on the left side.        Posterior tibial pulses are detected w/ Doppler on the right side and 0 on the left side.   Feet:      Comments: Left foot with very decreased capillary refill.  Foot is cold to the touch.        Procedures    Assessment / Plan      Assessment/Plan:   Diagnoses and all orders for this visit:    1. Age-related osteoporosis without current " pathological fracture (Primary)  Assessment & Plan:  Start monthly Actonel.  Discussed with patient how the medication works to improve bone density and decrease fracture risk.  Discussed appropriate administration of medication and potential side effects.    Orders:  -     risedronate (Actonel) 150 MG tablet; Take 1 tablet by mouth Every 30 (Thirty) Days. with water on empty stomach, nothing by mouth or lie down for next 30 minutes.  Dispense: 3 tablet; Refill: 4    2. Peripheral arterial disease with history of revascularization  -     CT Angio Abdominal Aorta Bilateral Iliofem Runoff With & Without Contrast; Future    3. Claudication of left lower extremity  Assessment & Plan:  Will get CTA of the abdomen with runoffs.  Suspect reocclusion or new occlusion of distal arteries.  Anticipate referral to a vascular surgeon.    Orders:  -     CT Angio Abdominal Aorta Bilateral Iliofem Runoff With & Without Contrast; Future    4. Stage 3b chronic kidney disease  Assessment & Plan:  Patient will need BMP prior to CTA and will need saline pretreatment day of procedure.    Orders:  -     CT Angio Abdominal Aorta Bilateral Iliofem Runoff With & Without Contrast; Future  -     Basic Metabolic Panel; Future    5. Primary hypertension  -     CT Angio Abdominal Aorta Bilateral Iliofem Runoff With & Without Contrast; Future  -     Basic Metabolic Panel; Future           Medications:     Current Outpatient Medications:     albuterol sulfate  (90 Base) MCG/ACT inhaler, Inhale 2 puffs Every 4 (Four) Hours As Needed for Wheezing., Disp: 10.8 g, Rfl: 1    allopurinol (Zyloprim) 300 MG tablet, Take 1 tablet by mouth Daily., Disp: 90 tablet, Rfl: 1    amLODIPine (NORVASC) 10 MG tablet, Take 1 tablet by mouth Daily., Disp: 90 tablet, Rfl: 1    aspirin 81 MG EC tablet, Take 1 tablet by mouth Daily., Disp: , Rfl:     atorvastatin (LIPITOR) 80 MG tablet, Take 1 tablet by mouth Daily., Disp: 90 tablet, Rfl: 3    escitalopram  (LEXAPRO) 10 MG tablet, Take 1 tablet by mouth Daily., Disp: 90 tablet, Rfl: 1    icosapent ethyl (Vascepa) 1 g capsule capsule, Take 2 g by mouth 2 (Two) Times a Day With Meals., Disp: 120 capsule, Rfl: 11    lisinopril (PRINIVIL,ZESTRIL) 30 MG tablet, Take 1 tablet by mouth Every 12 (Twelve) Hours., Disp: 180 tablet, Rfl: 1    nebivolol (BYSTOLIC) 5 MG tablet, Take 1 tablet by mouth Daily., Disp: 90 tablet, Rfl: 1    tiotropium bromide-olodaterol (Stiolto Respimat) 2.5-2.5 MCG/ACT aerosol solution inhaler, Inhale 2 puffs Daily., Disp: 12 g, Rfl: 1    risedronate (Actonel) 150 MG tablet, Take 1 tablet by mouth Every 30 (Thirty) Days. with water on empty stomach, nothing by mouth or lie down for next 30 minutes., Disp: 3 tablet, Rfl: 4    I spent 40 minutes caring for Orin on this date of service. This time includes time spent by me in the following activities:preparing for the visit, reviewing tests, obtaining and/or reviewing a separately obtained history, performing a medically appropriate examination and/or evaluation , counseling and educating the patient/family/caregiver, ordering medications, tests, or procedures, and documenting information in the medical record    Follow Up:   No follow-ups on file.    Yomaira Saldana PA-C   [facility]     NOTE TO PATIENT: The 21st Century Cures Act makes medical notes like these available to patients in the interest of transparency. However, be advised this is a medical document. It is intended as peer to peer communication. It is written in medical language and may contain abbreviations or verbiage that are unfamiliar. It may appear blunt or direct. Medical documents are intended to carry relevant information, facts as evident, and the clinical opinion of the practitioner.   Answers submitted by the patient for this visit:  Other (Submitted on 6/17/2024)  Please describe your symptoms.: Osteoporosis  Have you had these symptoms before?: Yes  How long  have you been having these symptoms?: Greater than 2 weeks  Please list any medications you are currently taking for this condition.: N/A  Please describe any probable cause for these symptoms. : Pain  Primary Reason for Visit (Submitted on 6/17/2024)  What is the primary reason for your visit?: Other

## 2024-06-18 NOTE — ASSESSMENT & PLAN NOTE
Will get CTA of the abdomen with runoffs.  Suspect reocclusion or new occlusion of distal arteries.  Anticipate referral to a vascular surgeon.

## 2024-06-25 ENCOUNTER — TELEPHONE (OUTPATIENT)
Dept: FAMILY MEDICINE CLINIC | Facility: CLINIC | Age: 69
End: 2024-06-25
Payer: MEDICARE

## 2024-06-25 DIAGNOSIS — M81.0 AGE-RELATED OSTEOPOROSIS WITHOUT CURRENT PATHOLOGICAL FRACTURE: Primary | ICD-10-CM

## 2024-06-25 NOTE — TELEPHONE ENCOUNTER
Caller: Orin Munoz    Relationship: Self    Best call back number: [phone]    Which medication are you concerned about: Risedronate     Who prescribed you this medication: MAICOL MONROY    When did you start taking this medication: HASN'T    What are your concerns: PT STATED HER INSURANCE PROVIDER SAID THEY DENIED THE MEDICATION. PT DOESN'T KNOW WHY MEDICATION WAS PRESCRIBED AND WANT TO KNOW IF NEEDED CAN SOMETHING ELSE BE PRESCRIBED.

## 2024-06-26 RX ORDER — IBANDRONATE SODIUM 150 MG/1
150 TABLET, FILM COATED ORAL
Qty: 3 TABLET | Refills: 4 | Status: SHIPPED | OUTPATIENT
Start: 2024-06-26

## 2024-08-16 ENCOUNTER — OFFICE VISIT (OUTPATIENT)
Dept: CARDIOLOGY | Facility: CLINIC | Age: 69
End: 2024-08-16
Payer: MEDICARE

## 2024-08-16 VITALS
BODY MASS INDEX: 24.69 KG/M2 | RESPIRATION RATE: 16 BRPM | SYSTOLIC BLOOD PRESSURE: 132 MMHG | HEART RATE: 69 BPM | HEIGHT: 64 IN | DIASTOLIC BLOOD PRESSURE: 74 MMHG | WEIGHT: 144.6 LBS | OXYGEN SATURATION: 98 %

## 2024-08-16 DIAGNOSIS — I73.9 PERIPHERAL ARTERIAL DISEASE WITH HISTORY OF REVASCULARIZATION: ICD-10-CM

## 2024-08-16 DIAGNOSIS — E78.2 MIXED HYPERLIPIDEMIA: ICD-10-CM

## 2024-08-16 DIAGNOSIS — I25.10 CORONARY ARTERY CALCIFICATION SEEN ON CT SCAN: ICD-10-CM

## 2024-08-16 DIAGNOSIS — I71.60 THORACOABDOMINAL AORTIC ANEURYSM (TAAA) WITHOUT RUPTURE, UNSPECIFIED PART: ICD-10-CM

## 2024-08-16 DIAGNOSIS — I10 PRIMARY HYPERTENSION: ICD-10-CM

## 2024-08-16 DIAGNOSIS — R01.1 HEART MURMUR: Primary | ICD-10-CM

## 2024-08-16 DIAGNOSIS — Z98.890 PERIPHERAL ARTERIAL DISEASE WITH HISTORY OF REVASCULARIZATION: ICD-10-CM

## 2024-08-16 PROCEDURE — 1159F MED LIST DOCD IN RCRD: CPT | Performed by: INTERNAL MEDICINE

## 2024-08-16 PROCEDURE — 3078F DIAST BP <80 MM HG: CPT | Performed by: INTERNAL MEDICINE

## 2024-08-16 PROCEDURE — 1160F RVW MEDS BY RX/DR IN RCRD: CPT | Performed by: INTERNAL MEDICINE

## 2024-08-16 PROCEDURE — 99214 OFFICE O/P EST MOD 30 MIN: CPT | Performed by: INTERNAL MEDICINE

## 2024-08-16 PROCEDURE — 3075F SYST BP GE 130 - 139MM HG: CPT | Performed by: INTERNAL MEDICINE

## 2024-08-16 RX ORDER — LISINOPRIL 30 MG/1
30 TABLET ORAL EVERY 12 HOURS SCHEDULED
Qty: 180 TABLET | Refills: 3 | Status: SHIPPED | OUTPATIENT
Start: 2024-08-16

## 2024-08-16 NOTE — PROGRESS NOTES
[facility]  [facility] CARDIOLOG[facility]  [address] [place] [address]  Dept: [phone]  Dept Fax: [phone]    Date: 08/16/2024  Patient: Orin Munoz  YOB: 1955    Follow Up Office Visit Note    Interval Follow-up  Ms. Orin Munoz is a 69 y.o. female who is here for follow-up on Heart Murmur, Hypertension, and Aortic Aneurysm.    Subjective   Patient doing well from the cardiovascular standpoint today.  Patient denies angina, orthopnea, PND, palpitations, lightheadedness, syncope or medications side-effects.  Patient complaining of significant back pain radiating down the left leg, worse with vacuuming and standing doing the dishes.    The following portions of the patient's history were reviewed and updated as appropriate: allergies, current medications, past family history, past medical history, past social history, past surgical history, and problem list.    Medications:   Allergies   Allergen Reactions    Penicillins Hives    Metaproterenol Other (See Comments)    Other Other (See Comments)     Alupent    Ipratropium Bromide Anxiety      Current Outpatient Medications   Medication Instructions    albuterol sulfate  (90 Base) MCG/ACT inhaler 2 puffs, Inhalation, Every 4 Hours PRN    allopurinol (ZYLOPRIM) 300 mg, Oral, Daily    amLODIPine (NORVASC) 10 mg, Oral, Daily    aspirin 81 mg, Oral, Daily    atorvastatin (LIPITOR) 80 mg, Oral, Daily    escitalopram (LEXAPRO) 10 mg, Oral, Daily    ibandronate (BONIVA) 150 mg, Oral, Every 30 Days    icosapent ethyl (VASCEPA) 2 g, Oral, 2 Times Daily With Meals    lisinopril (PRINIVIL,ZESTRIL) 30 mg, Oral, Every 12 Hours Scheduled    nebivolol (BYSTOLIC) 5 mg, Oral, Daily    tiotropium bromide-olodaterol (Stiolto Respimat) 2.5-2.5 MCG/ACT aerosol solution inhaler 2 puffs, Inhalation, Daily - RT       Tobacco Use: Medium Risk (8/16/2024)    Patient History     Smoking Tobacco Use: Former     Smokeless Tobacco Use:  "Never     Passive Exposure: Not on file        Objective  Vitals:    08/16/24 0935   BP: 132/74   Pulse: 69   Resp: 16   SpO2: 98%   Weight: 65.6 kg (144 lb 9.6 oz)   Height: 162.6 cm (64\")   PainSc:   5   PainLoc: Back      Vitals:    08/16/24 0935   BP: 132/74   Pulse: 69   Resp: 16   SpO2: 98%   Weight: 65.6 kg (144 lb 9.6 oz)   Height: 162.6 cm (64\")          Physical Exam  Constitutional:       Appearance: Not in distress.   Neck:      Vascular: JVD normal.   Pulmonary:      Breath sounds: Normal breath sounds.   Cardiovascular:      Normal rate. Regular rhythm.      Murmurs: There is a grade 1/6 early systolic murmur at the URSB.   Pulses:     Dorsalis pedis: 1+ on the left side and 2+ on the right side.     Posterior tibial: 1+ on the left side and 2+ on the right side.  Edema:     Peripheral edema absent.   Neurological:      Mental Status: Alert.              Diagnostic Data  Lab Results   Component Value Date     05/08/2024    K 4.5 05/08/2024     05/08/2024    CO2 24 05/08/2024    BUN 13 05/08/2024    CREATININE 1.29 (H) 05/08/2024    CALCIUM 9.5 05/08/2024    BILITOT 0.3 12/08/2023    ALKPHOS 69 12/08/2023    ALT 18 12/08/2023    AST 29 12/08/2023    GLUCOSE 130 (H) 05/08/2024    ALBUMIN 4.7 12/08/2023     Lab Results   Component Value Date    WBC 5.7 12/08/2023    HGB 13.3 12/08/2023    HCT 39.5 12/08/2023     12/08/2023     No results found for: \"APTT\", \"INR\", \"PTT\"  Lab Results   Component Value Date    CKTOTAL 99 12/08/2023    CKTOTAL 82 07/28/2023     No results found for: \"BNP\", \"PROBNP\"    Lab Results   Component Value Date    CHLPL 211 (H) 12/08/2023    TRIG 165 (H) 12/08/2023    HDL 60 12/08/2023     (H) 12/08/2023     Lab Results   Component Value Date    TSH 1.500 07/28/2023    FREET4 1.17 07/28/2023       CV Diagnostics:  Procedures    CXR: No results found for this or any previous visit.     ECHO/MUGA: No results found for this or any previous visit.     STRESS " TESTS: Results for orders placed in visit on 05/30/24    Stress Test With Myocardial Perfusion One Day    Interpretation Summary    Impressions are consistent with a normal/low risk Lexiscan nuclear study.    Myocardial perfusion imaging indicates a normal myocardial perfusion study with no evidence of ischemia.    Left ventricular ejection fraction is normal (Calculated EF = 65%).    Breast attenuation artifact is present.     CARDIAC CATH: No results found for this or any previous visit.     DEVICES: No valid procedures specified.   HOLTER: No results found for this or any previous visit.     CT/MRI:  No results found for this or any previous visit.    VASCULAR: No valid procedures specified.     Assessment and Plan  Diagnoses and all orders for this visit:    1. Heart murmur (Primary)  Assessment & Plan:  Aortic sclerosis as per last transthoracic echocardiogram.  Follow-up transthoracic echocardiogram in 3 years.      2. Primary hypertension  Assessment & Plan:  Hypertension is stable and controlled  Continue current treatment regimen.  Dietary sodium restriction.  Regular aerobic exercise.  Ambulatory blood pressure monitoring.  Blood pressure will be reassessed in 6 months.    Orders:  -     lisinopril (PRINIVIL,ZESTRIL) 30 MG tablet; Take 1 tablet by mouth Every 12 (Twelve) Hours.  Dispense: 180 tablet; Refill: 3    3. Mixed hyperlipidemia  Assessment & Plan:   Lipid abnormalities are improving with treatment    Plan:  Continue same medication/s without change.  Atorvastatin 80 mg p.o. daily and Vascepa 2 g twice daily    Discussed medication dosage, use, side effects, and goals of treatment in detail.    Counseled patient on lifestyle modifications to help control hyperlipidemia.   Cholesterol lowering dietary information shared with patient.  Advised patient to exercise for 150 minutes weekly. (30 minute brisk walk, 5 days a week for example)    Lab Results   Component Value Date     (H) 12/08/2023      (H) 07/28/2023    HDL 60 12/08/2023    HDL 45 07/28/2023    CHLPL 211 (H) 12/08/2023    CHLPL 173 07/28/2023    TRIG 165 (H) 12/08/2023    TRIG 160 (H) 07/28/2023       Patient Treatment Goals:   LDL goal is less than 70.  Awaiting repeat lipid profile.    Followup in 6 months.      4. Peripheral arterial disease with history of revascularization  Assessment & Plan:  Used to be followed by Dr. Alicia, not anymore.  PVD seen prior stenting left femoral/iliac 6 years ago.  Last workup showing:  US duplex of BLE 3/2022  50-69% suggested narrowing in both CFA  30-59% narrowing in the BLE SFA.   Patient with clinical claudication left lower extremity, nonetheless current picture statica more dominant.  Pulses present left lower extremity, decreased when compared to the right lower extremity.  Referred for CTA abdominal aorta with distal runoff as per primary care for aneurysm and PVD, to follow-up on results.  Risk factors modification: Blood pressure to goal, lipid-lowering therapy optimized.  Consider cilostazol for claudication if no plans for revascularization based on CTA results.      5. Coronary artery calcification seen on CT scan  Assessment & Plan:  Heavy calcification seen.  EKG abnormal.  No chest pain.  Limited exercise capacity with left lower extremity cramping and shooting pain.  Lexiscan nuclear stress test negative.  Plan:  Risk factors modifications as above      6. 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  Ascending thoracic aorta 3.8 cm and abdominal aorta 3.3 cm/4.1 cm distally.  Awaiting CTA abdominal aorta with distal runoff results.           Return in about 6 months (around 2/16/2025) for Follow-up with Dr Rollins.    There are no Patient Instructions on file for this visit.    Cornelio Rollins MD

## 2024-08-16 NOTE — ASSESSMENT & PLAN NOTE
Aortic sclerosis as per last transthoracic echocardiogram.  Follow-up transthoracic echocardiogram in 3 years.

## 2024-08-16 NOTE — ASSESSMENT & PLAN NOTE
Used to be followed by Dr. Alicia, not anymore.  PVD seen prior stenting left femoral/iliac 6 years ago.  Last workup showing:  US duplex of BLE 3/2022  50-69% suggested narrowing in both CFA  30-59% narrowing in the BLE SFA.   Patient with clinical claudication left lower extremity, nonetheless current picture statica more dominant.  Pulses present left lower extremity, decreased when compared to the right lower extremity.  Referred for CTA abdominal aorta with distal runoff as per primary care for aneurysm and PVD, to follow-up on results.  Risk factors modification: Blood pressure to goal, lipid-lowering therapy optimized.  Consider cilostazol for claudication if no plans for revascularization based on CTA results.

## 2024-08-16 NOTE — ASSESSMENT & PLAN NOTE
Seen on low-dose CT chest and MRI.  Ascending thoracic aorta 3.8 cm and abdominal aorta 3.3 cm/4.1 cm distally.  Awaiting CTA abdominal aorta with distal runoff results.

## 2024-08-16 NOTE — ASSESSMENT & PLAN NOTE
Heavy calcification seen.  EKG abnormal.  No chest pain.  Limited exercise capacity with left lower extremity cramping and shooting pain.  Lexiscan nuclear stress test negative.  Plan:  Risk factors modifications as above

## 2024-08-16 NOTE — ASSESSMENT & PLAN NOTE
Lipid abnormalities are improving with treatment    Plan:  Continue same medication/s without change.  Atorvastatin 80 mg p.o. daily and Vascepa 2 g twice daily    Discussed medication dosage, use, side effects, and goals of treatment in detail.    Counseled patient on lifestyle modifications to help control hyperlipidemia.   Cholesterol lowering dietary information shared with patient.  Advised patient to exercise for 150 minutes weekly. (30 minute brisk walk, 5 days a week for example)    Lab Results   Component Value Date     (H) 12/08/2023     (H) 07/28/2023    HDL 60 12/08/2023    HDL 45 07/28/2023    CHLPL 211 (H) 12/08/2023    CHLPL 173 07/28/2023    TRIG 165 (H) 12/08/2023    TRIG 160 (H) 07/28/2023       Patient Treatment Goals:   LDL goal is less than 70.  Awaiting repeat lipid profile.    Followup in 6 months.

## 2024-08-18 ENCOUNTER — HOSPITAL ENCOUNTER (OUTPATIENT)
Facility: HOSPITAL | Age: 69
Discharge: HOME | End: 2024-08-18
Admitting: PHYSICIAN ASSISTANT
Payer: MEDICARE

## 2024-08-18 DIAGNOSIS — I73.9 PERIPHERAL ARTERIAL DISEASE WITH HISTORY OF REVASCULARIZATION: ICD-10-CM

## 2024-08-18 DIAGNOSIS — I10 PRIMARY HYPERTENSION: ICD-10-CM

## 2024-08-18 DIAGNOSIS — N18.32 STAGE 3B CHRONIC KIDNEY DISEASE: ICD-10-CM

## 2024-08-18 DIAGNOSIS — I73.9 CLAUDICATION OF LEFT LOWER EXTREMITY: ICD-10-CM

## 2024-08-18 DIAGNOSIS — Z98.890 PERIPHERAL ARTERIAL DISEASE WITH HISTORY OF REVASCULARIZATION: ICD-10-CM

## 2024-08-18 LAB — CREAT BLDA-MCNC: 1.4 MG/DL (ref 0.6–1.3)

## 2024-08-18 PROCEDURE — 25510000001 IOPAMIDOL PER 1 ML: Performed by: PHYSICIAN ASSISTANT

## 2024-08-18 PROCEDURE — 75635 CT ANGIO ABDOMINAL ARTERIES: CPT

## 2024-08-18 PROCEDURE — 82565 ASSAY OF CREATININE: CPT

## 2024-08-18 RX ADMIN — IOPAMIDOL 125 ML: 755 INJECTION, SOLUTION INTRAVENOUS at 11:20

## 2024-08-29 DIAGNOSIS — I73.9 CLAUDICATION IN PERIPHERAL VASCULAR DISEASE: ICD-10-CM

## 2024-08-29 DIAGNOSIS — I71.40 ABDOMINAL AORTIC ANEURYSM (AAA) WITHOUT RUPTURE, UNSPECIFIED PART: Primary | ICD-10-CM

## 2024-09-12 ENCOUNTER — OFFICE VISIT (OUTPATIENT)
Dept: FAMILY MEDICINE CLINIC | Facility: CLINIC | Age: 69
End: 2024-09-12
Payer: MEDICARE

## 2024-09-12 VITALS
BODY MASS INDEX: 24.75 KG/M2 | SYSTOLIC BLOOD PRESSURE: 138 MMHG | HEIGHT: 64 IN | WEIGHT: 145 LBS | OXYGEN SATURATION: 95 % | HEART RATE: 78 BPM | DIASTOLIC BLOOD PRESSURE: 84 MMHG | RESPIRATION RATE: 15 BRPM

## 2024-09-12 DIAGNOSIS — E78.2 MIXED HYPERLIPIDEMIA: ICD-10-CM

## 2024-09-12 DIAGNOSIS — R91.8 MULTIPLE NODULES OF LUNG: ICD-10-CM

## 2024-09-12 DIAGNOSIS — I71.43 INFRARENAL ABDOMINAL AORTIC ANEURYSM (AAA) WITHOUT RUPTURE: ICD-10-CM

## 2024-09-12 DIAGNOSIS — Z00.00 MEDICARE ANNUAL WELLNESS VISIT, SUBSEQUENT: Primary | ICD-10-CM

## 2024-09-12 DIAGNOSIS — Z12.31 SCREENING MAMMOGRAM FOR BREAST CANCER: ICD-10-CM

## 2024-09-12 DIAGNOSIS — I73.9 PVD (PERIPHERAL VASCULAR DISEASE) WITH CLAUDICATION: ICD-10-CM

## 2024-09-12 DIAGNOSIS — Z87.891 PERSONAL HISTORY OF TOBACCO USE: ICD-10-CM

## 2024-09-12 DIAGNOSIS — I25.10 CORONARY ARTERY CALCIFICATION SEEN ON CT SCAN: ICD-10-CM

## 2024-09-12 PROBLEM — I70.1 LEFT RENAL ARTERY STENOSIS: Status: ACTIVE | Noted: 2024-09-12

## 2024-09-12 PROCEDURE — 1159F MED LIST DOCD IN RCRD: CPT | Performed by: PHYSICIAN ASSISTANT

## 2024-09-12 PROCEDURE — 3079F DIAST BP 80-89 MM HG: CPT | Performed by: PHYSICIAN ASSISTANT

## 2024-09-12 PROCEDURE — G0439 PPPS, SUBSEQ VISIT: HCPCS | Performed by: PHYSICIAN ASSISTANT

## 2024-09-12 PROCEDURE — 1160F RVW MEDS BY RX/DR IN RCRD: CPT | Performed by: PHYSICIAN ASSISTANT

## 2024-09-12 PROCEDURE — 99214 OFFICE O/P EST MOD 30 MIN: CPT | Performed by: PHYSICIAN ASSISTANT

## 2024-09-12 PROCEDURE — 3075F SYST BP GE 130 - 139MM HG: CPT | Performed by: PHYSICIAN ASSISTANT

## 2024-09-12 PROCEDURE — 1125F AMNT PAIN NOTED PAIN PRSNT: CPT | Performed by: PHYSICIAN ASSISTANT

## 2024-09-12 NOTE — ASSESSMENT & PLAN NOTE
We will go ahead and try to get Repatha approved to maximize medical management of atherosclerosis.

## 2024-09-12 NOTE — ASSESSMENT & PLAN NOTE
CTA 8/18/24    1. Infrarenal abdominal aortic aneurysm measuring 4.4 x 4.1 cm with moderate amount of mural thrombus.  2. Occluded left common iliac artery stent with a patent right common iliac artery stent.  3. Multifocal stenosis, left greater than right of the superficial femoral arteries, likely hemodynamically significant on the left  4. Three-vessel runoff to the bilateral lower extremities  5. Multifocal stenosis of the left renal artery with left renal atrophy.    Patient already referred to Aaron Villarreal MD cardiovascular surgeon.

## 2024-09-12 NOTE — PROGRESS NOTES
Subjective   The ABCs of the Annual Wellness Visit  Medicare Wellness Visit      Orin Munoz is a 69 y.o. patient who presents for a Medicare Wellness Visit.    The following portions of the patient's history were reviewed and   updated as appropriate: allergies, current medications, past family history, past medical history, past social history, past surgical history, and problem list.    Compared to one year ago, the patient's physical   health is worse.  Compared to one year ago, the patient's mental   health is worse.    Recent Hospitalizations:  She was not admitted to the hospital during the last year.     Current Medical Providers:  Patient Care Team:  Yomaira Saldana PA as PCP - General (Family Medicine)  Tristan Springer MD as Consulting Physician (Internal Medicine)  Cuco Alicia MD as Consulting Physician (Cardiology)  Walt Graham DPM as Consulting Physician (Podiatry)  Derrick Black MD as Consulting Physician (Ophthalmology)  Mango Jane MD as Consulting Physician (Orthopedic Surgery)  Tyra Melissa MD as Consulting Physician (Nephrology)  Montez Barnes MD (Inactive) as Consulting Physician (Orthopedic Surgery)    Outpatient Medications Prior to Visit   Medication Sig Dispense Refill    albuterol sulfate  (90 Base) MCG/ACT inhaler Inhale 2 puffs Every 4 (Four) Hours As Needed for Wheezing. 10.8 g 1    allopurinol (Zyloprim) 300 MG tablet Take 1 tablet by mouth Daily. 90 tablet 1    amLODIPine (NORVASC) 10 MG tablet Take 1 tablet by mouth Daily. 90 tablet 1    aspirin 81 MG EC tablet Take 1 tablet by mouth Daily.      atorvastatin (LIPITOR) 80 MG tablet Take 1 tablet by mouth Daily. 90 tablet 3    escitalopram (LEXAPRO) 10 MG tablet Take 1 tablet by mouth Daily. 90 tablet 1    ibandronate (BONIVA) 150 MG tablet Take 1 tablet by mouth Every 30 (Thirty) Days. 3 tablet 4    icosapent ethyl (Vascepa) 1 g capsule capsule Take 2 g by mouth 2 (Two) Times a Day With  Meals. 120 capsule 11    lisinopril (PRINIVIL,ZESTRIL) 30 MG tablet Take 1 tablet by mouth Every 12 (Twelve) Hours. 180 tablet 3    nebivolol (BYSTOLIC) 5 MG tablet Take 1 tablet by mouth Daily. 90 tablet 1    tiotropium bromide-olodaterol (Stiolto Respimat) 2.5-2.5 MCG/ACT aerosol solution inhaler Inhale 2 puffs Daily. 12 g 1     No facility-administered medications prior to visit.     No opioid medication identified on active medication list. I have reviewed chart for other potential  high risk medication/s and harmful drug interactions in the elderly.      Aspirin is on active medication list. Aspirin use is indicated based on review of current medical condition/s. Pros and cons of this therapy have been discussed today. Benefits of this medication outweigh potential harm.  Patient has been encouraged to continue taking this medication.  .      Patient Active Problem List   Diagnosis    Asthma    Low back pain    Lumbar radiculopathy    Spondylolisthesis    Simple chronic bronchitis    History of fractured pelvis    Primary hypertension    Hyperlipidemia    Recurrent major depressive disorder, in remission    Generalized anxiety disorder    PVD (peripheral vascular disease) with claudication    Medicare annual wellness visit, initial    Sciatica of left side    Heart murmur    Lumbar spondylosis    COPD (chronic obstructive pulmonary disease)    Carotid bruit    Thoracoabdominal aortic aneurysm (TAAA) without rupture    Stage 3b chronic kidney disease    High risk medication use    Rib pain on right side    Acute pain of left knee    Chronic foot pain    Menopause    Medicare annual wellness visit, subsequent    Coronary artery calcification seen on CT scan    Abnormal EKG    History of bilateral cataract extraction    History of cholecystectomy    History of total hysterectomy    Gout    Multiple nodules of lung    Neuropathy    Age-related osteoporosis without current pathological fracture    Claudication of  "left lower extremity    Personal history of tobacco use    Infrarenal abdominal aortic aneurysm (AAA) without rupture    Left renal artery stenosis     Advance Care Planning Advance Directive is not on file.  ACP discussion was held with the patient during this visit. Patient does not have an advance directive, information provided.            Objective   Vitals:    24 0952   BP: 138/84   BP Location: Right arm   Patient Position: Sitting   Cuff Size: Adult   Pulse: 78   Resp: 15   SpO2: 95%   Weight: 65.8 kg (145 lb)   Height: 162.6 cm (64\")       Estimated body mass index is 24.89 kg/m² as calculated from the following:    Height as of this encounter: 162.6 cm (64\").    Weight as of this encounter: 65.8 kg (145 lb).    BMI is within normal parameters. No other follow-up for BMI required.       Does the patient have evidence of cognitive impairment? No                                                                                                Health  Risk Assessment    Smoking Status:  Social History     Tobacco Use   Smoking Status Former    Current packs/day: 0.00    Average packs/day: 1 pack/day for 30.3 years (30.3 ttl pk-yrs)    Types: Cigarettes    Start date: 1988    Quit date: 2018    Years since quittin.3   Smokeless Tobacco Never     Alcohol Consumption:  Social History     Substance and Sexual Activity   Alcohol Use Yes    Alcohol/week: 3.0 standard drinks of alcohol    Types: 3 Shots of liquor per week       Fall Risk Screen  STEADI Fall Risk Assessment was completed, and patient is at MODERATE risk for falls. Assessment completed on:2024    Depression Screenin/12/2024     9:51 AM   PHQ-2/PHQ-9 Depression Screening   Little Interest or Pleasure in Doing Things 0-->not at all   Feeling Down, Depressed or Hopeless 1-->several days   PHQ-9: Brief Depression Severity Measure Score 1     Health Habits and Functional and Cognitive Screenin/10/2024    12:38 PM "   Functional & Cognitive Status   Do you have difficulty preparing food and eating? No   Do you have difficulty bathing yourself, getting dressed or grooming yourself? No   Do you have difficulty using the toilet? No   Do you have difficulty moving around from place to place? No   Do you have trouble with steps or getting out of a bed or a chair? Yes   Current Diet Well Balanced Diet   Dental Exam Up to date   Eye Exam Up to date   Exercise (times per week) 5 times per week   Current Exercises Include House Cleaning   Do you need help using the phone?  No   Are you deaf or do you have serious difficulty hearing?  No   Do you need help to go to places out of walking distance? No   Do you need help shopping? No   Do you need help preparing meals?  No   Do you need help with housework?  No   Do you need help with laundry? No   Do you need help taking your medications? No   Do you need help managing money? No   Do you ever drive or ride in a car without wearing a seat belt? No   Have you felt unusual stress, anger or loneliness in the last month? Yes   Who do you live with? Alone   If you need help, do you have trouble finding someone available to you? No   Have you been bothered in the last four weeks by sexual problems? No   Do you have difficulty concentrating, remembering or making decisions? No           Age-appropriate Screening Schedule:  Refer to the list below for future screening recommendations based on patient's age, sex and/or medical conditions. Orders for these recommended tests are listed in the plan section. The patient has been provided with a written plan.    Health Maintenance List  Health Maintenance   Topic Date Due    MAMMOGRAM  09/26/2024    LUNG CANCER SCREENING  09/26/2024    INFLUENZA VACCINE  03/31/2025 (Originally 8/1/2024)    COVID-19 Vaccine (4 - 2023-24 season) 09/12/2025 (Originally 9/1/2024)    TDAP/TD VACCINES (1 - Tdap) 09/12/2025 (Originally 6/8/1974)    ZOSTER VACCINE (1 of 2)  "09/12/2025 (Originally 6/8/2003)    LIPID PANEL  12/08/2024    ANNUAL WELLNESS VISIT  09/12/2025    DXA SCAN  05/31/2026    COLORECTAL CANCER SCREENING  08/08/2026    HEPATITIS C SCREENING  Completed    Pneumococcal Vaccine 65+  Completed                                                                                                                                                CMS Preventative Services Quick Reference  Risk Factors Identified During Encounter  Immunizations Discussed/Encouraged: Tdap, Influenza, Shingrix, COVID19, and RSV (Respiratory Syncytial Virus)    The above risks/problems have been discussed with the patient.  Pertinent information has been shared with the patient in the After Visit Summary.  An After Visit Summary and PPPS were made available to the patient.    Follow Up:   Next Medicare Wellness visit to be scheduled in 1 year.         Additional E&M Note during same encounter follows:  Patient has additional, significant, and separately identifiable condition(s)/problem(s) that require work above and beyond the Medicare Wellness Visit     Chief Complaint  Medicare Wellness-subsequent and left leg pain    Subjective   HPI  Orin is also being seen today for additional medical problem/s.                Objective   Vital Signs:  /84 (BP Location: Right arm, Patient Position: Sitting, Cuff Size: Adult)   Pulse 78   Resp 15   Ht 162.6 cm (64\")   Wt 65.8 kg (145 lb)   SpO2 95%   BMI 24.89 kg/m²   Physical Exam  Constitutional:       General: She is not in acute distress.  Neurological:      Mental Status: She is alert.   Psychiatric:         Mood and Affect: Mood normal.         Behavior: Behavior normal.         Thought Content: Thought content normal.         Judgment: Judgment normal.                 Assessment and Plan               Medicare annual wellness visit, subsequent  Updated annual wellness visit checklist.  Immunizations discussed.  Screening up-to-date.  Recommend " yearly dental and eye exams. Also discussed monitoring of blood pressure and lipids. We addressed patient self-assessment of health status, frailty, and physical functioning. We reviewed psychosocial risks, behavioral risks, instrumental activities of daily living, and patient health risk assessment. Patient was given a personalized prevention plan.    PVD (peripheral vascular disease) with claudication  CTA 8/18/24    1. Infrarenal abdominal aortic aneurysm measuring 4.4 x 4.1 cm with moderate amount of mural thrombus.  2. Occluded left common iliac artery stent with a patent right common iliac artery stent.  3. Multifocal stenosis, left greater than right of the superficial femoral arteries, likely hemodynamically significant on the left  4. Three-vessel runoff to the bilateral lower extremities  5. Multifocal stenosis of the left renal artery with left renal atrophy.    Patient already referred to Aaron Villarreal MD cardiovascular surgeon.  Coronary artery calcification seen on CT scan  We will go ahead and try to get Repatha approved to maximize medical management of atherosclerosis.  Mixed hyperlipidemia   We will go ahead and try to get Repatha approved to maximize medical management of atherosclerosis.  Screening mammogram for breast cancer    Multiple nodules of lung    Personal history of tobacco use    Infrarenal abdominal aortic aneurysm (AAA) without rupture  CTA 8/18/24    1. Infrarenal abdominal aortic aneurysm measuring 4.4 x 4.1 cm with moderate amount of mural thrombus.  2. Occluded left common iliac artery stent with a patent right common iliac artery stent.  3. Multifocal stenosis, left greater than right of the superficial femoral arteries, likely hemodynamically significant on the left  4. Three-vessel runoff to the bilateral lower extremities  5. Multifocal stenosis of the left renal artery with left renal atrophy.    Patient already referred to Aaron Villarreal MD cardiovascular  surgeon.    Orders Placed This Encounter   Procedures     CT Chest Low Dose Cancer Screening WO     Standing Status:   Future     Order Specific Question:   The patient is age 50-80 (Medicare coverage 50-77)     Answer:   69     Order Specific Question:   The patient is a current smoker?     Answer:   No     Order Specific Question:   Is the patient a former smoker who has quit within the last 15 years? (If the answer to this is no they do not meet criteria for this exam)     Answer:   Yes     Order Specific Question:   The number of years since quitting smoking.     Answer:   6     Order Specific Question:   Does the patient have a smoking history of 20 pack-years or greater? (If the answer to this is no they do not meet criteria for this exam)     Answer:   Yes     Order Specific Question:   Actual pack - year smoking history (number):     Answer:   30     Order Specific Question:   Does the patient have any clinical signs/symptoms of lung cancer?     Answer:   No     Order Specific Question:   The patient was engaged in shared decision-making for this test:     Answer:   Yes    Mammo Screening Digital Tomosynthesis Bilateral With CAD     Standing Status:   Future     Standing Expiration Date:   9/12/2025     Order Specific Question:   Reason for Exam:     Answer:   screening     Order Specific Question:   Does this patient have a diabetic monitoring/medication delivering device on?     Answer:   No     Order Specific Question:   Release to patient     Answer:   Routine Release [[id number]]     New Medications Ordered This Visit   Medications    Evolocumab (REPATHA) solution auto-injector SureClick injection     Sig: Inject 1 mL under the skin into the appropriate area as directed Every 14 (Fourteen) Days.     Dispense:  6 mL     Refill:  4          Follow Up   No follow-ups on file.  Patient was given instructions and counseling regarding her condition or for health maintenance advice. Please see specific  information pulled into the AVS if appropriate.

## 2024-09-12 NOTE — PATIENT INSTRUCTIONS
"Healthy Eating  Following a healthy eating pattern may help you to achieve and maintain a healthy body weight, reduce the risk of chronic disease, and live a long and productive life. It is important to follow a healthy eating pattern at an appropriate calorie level for your body. Your nutritional needs should be met primarily through food by choosing a variety of nutrient-rich foods.  What are tips for following this plan?  Reading food labels  Read labels and choose the following:  Reduced or low sodium.  Juices with 100% fruit juice.  Foods with low saturated fats and high polyunsaturated and monounsaturated fats.  Foods with whole grains, such as whole wheat, cracked wheat, brown rice, and wild rice.  Whole grains that are fortified with folic acid. This is recommended for women who are pregnant or who want to become pregnant.  Read labels and avoid the following:  Foods with a lot of added sugars. These include foods that contain brown sugar, corn sweetener, corn syrup, dextrose, fructose, glucose, high-fructose corn syrup, honey, invert sugar, lactose, malt syrup, maltose, molasses, raw sugar, sucrose, trehalose, or turbinado sugar.  Do not eat more than the following amounts of added sugar per day:  6 teaspoons (25 g) for women.  9 teaspoons (38 g) for men.  Foods that contain processed or refined starches and grains.  Refined grain products, such as white flour, degermed cornmeal, white bread, and white rice.  Shopping  Choose nutrient-rich snacks, such as vegetables, whole fruits, and nuts. Avoid high-calorie and high-sugar snacks, such as potato chips, fruit snacks, and candy.  Use oil-based dressings and spreads on foods instead of solid fats such as butter, stick margarine, or cream cheese.  Limit pre-made sauces, mixes, and \"instant\" products such as flavored rice, instant noodles, and ready-made pasta.  Try more plant-protein sources, such as tofu, tempeh, black beans, edamame, lentils, nuts, and " seeds.  Explore eating plans such as the Mediterranean diet or vegetarian diet.  Cooking  Use oil to sauté or stir-arroyo foods instead of solid fats such as butter, stick margarine, or lard.  Try baking, boiling, grilling, or broiling instead of frying.  Remove the fatty part of meats before cooking.  Steam vegetables in water or broth.  Meal planning    At meals, imagine dividing your plate into fourths:  One-half of your plate is fruits and vegetables.  One-fourth of your plate is whole grains.  One-fourth of your plate is protein, especially lean meats, poultry, eggs, tofu, beans, or nuts.  Include low-fat dairy as part of your daily diet.     Lifestyle  Choose healthy options in all settings, including home, work, school, restaurants, or stores.  Prepare your food safely:  Wash your hands after handling raw meats.  Keep food preparation surfaces clean by regularly washing with hot, soapy water.  Keep raw meats separate from ready-to-eat foods, such as fruits and vegetables.  , meat, poultry, and eggs to the recommended internal temperature.  Store foods at safe temperatures. In general:  Keep cold foods at 40°F (4.4°C) or below.  Keep hot foods at 140°F (60°C) or above.  Keep your freezer at 0°F (-17.8°C) or below.  Foods are no longer safe to eat when they have been between the temperatures of 40°-140°F (4.4-60°C) for more than 2 hours.  What foods should I eat?  Fruits  Aim to eat 2 cup-equivalents of fresh, canned (in natural juice), or frozen fruits each day. Examples of 1 cup-equivalent of fruit include 1 small apple, 8 large strawberries, 1 cup canned fruit, ½ cup dried fruit, or 1 cup 100% juice.  Vegetables  Aim to eat 2½-3 cup-equivalents of fresh and frozen vegetables each day, including different varieties and colors. Examples of 1 cup-equivalent of vegetables include 2 medium carrots, 2 cups raw, leafy greens, 1 cup chopped vegetable (raw or cooked), or 1 medium baked potato.  Grains  Aim  to eat 6 ounce-equivalents of whole grains each day. Examples of 1 ounce-equivalent of grains include 1 slice of bread, 1 cup ready-to-eat cereal, 3 cups popcorn, or ½ cup cooked rice, pasta, or cereal.  Meats and other proteins  Aim to eat 5-6 ounce-equivalents of protein each day. Examples of 1 ounce-equivalent of protein include 1 egg, 1/2 cup nuts or seeds, or 1 tablespoon (16 g) peanut butter. A cut of meat or fish that is the size of a deck of cards is about 3-4 ounce-equivalents.  Of the protein you eat each week, try to have at least 8 ounces come from seafood. This includes salmon, trout, herring, and anchovies.  Dairy  Aim to eat 3 cup-equivalents of fat-free or low-fat dairy each day. Examples of 1 cup-equivalent of dairy include 1 cup (240 mL) milk, 8 ounces (250 g) yogurt, 1½ ounces (44 g) natural cheese, or 1 cup (240 mL) fortified soy milk.  Fats and oils  Aim for about 5 teaspoons (21 g) per day. Choose monounsaturated fats, such as canola and olive oils, avocados, peanut butter, and most nuts, or polyunsaturated fats, such as sunflower, corn, and soybean oils, walnuts, pine nuts, sesame seeds, sunflower seeds, and flaxseed.  Beverages  Aim for six 8-oz glasses of water per day. Limit coffee to three to five 8-oz cups per day.  Limit caffeinated beverages that have added calories, such as soda and energy drinks.  Limit alcohol intake to no more than 1 drink a day for nonpregnant women and 2 drinks a day for men. One drink equals 12 oz of beer (355 mL), 5 oz of wine (148 mL), or 1½ oz of hard liquor (44 mL).  Seasoning and other foods  Avoid adding excess amounts of salt to your foods. Try flavoring foods with herbs and spices instead of salt.  Avoid adding sugar to foods.  Try using oil-based dressings, sauces, and spreads instead of solid fats.  This information is based on general [place] nutrition guidelines. For more information, visit choosemyplate.gov. Exact amounts may vary based on your  nutrition needs.  Summary  A healthy eating plan may help you to maintain a healthy weight, reduce the risk of chronic diseases, and stay active throughout your life.  Plan your meals. Make sure you eat the right portions of a variety of nutrient-rich foods.  Try baking, boiling, grilling, or broiling instead of frying.  Choose healthy options in all settings, including home, work, school, restaurants, or stores.  This information is not intended to replace advice given to you by your health care provider. Make sure you discuss any questions you have with your health care provider.  Document Revised: 04/01/2019 Document Reviewed: 04/01/2019  [email or web address] Patient Education © 2021 [email or web address] Lung Cancer Screening  A lung cancer screening is a test that checks for lung cancer. Lung cancer screening is done to look for lung cancer in its very early stages when you are not likely to have any symptoms and before it spreads beyond the lung, making it harder to treat. Finding cancer early improves the chances of successful treatment. It may save your life.  Who should have screening?  You should be screened for lung cancer if all of these apply:  You currently smoke or you have quit smoking within the past 15 years.  You are 50-80 years old. Screening may be recommended up to age 80 depending on your overall health and other factors.  You are in good general health.  You have a smoking history of 1 pack of cigarettes a day for 20 years or 2 packs a day for 10 years.  Screening may also be recommended if you are at high risk for the disease. You may be at high risk if:  You have a family history of lung cancer.  You have been exposed to asbestos or radon.  You have chronic obstructive pulmonary disease (COPD).  How is screening done?    The recommended screening test is a low-dose computed tomography (LDCT) scan. This scan takes detailed images of the lungs. This allows a health care provider to look for abnormal cells. If  you are at risk for lung cancer, it is recommended that you get screened once a year. Talk to your health care provider about the risks, benefits, and limitations of screening.  What are the benefits of screening?  Screening can find lung cancer early, before symptoms start and before it has spread outside of the lungs. The chances of curing lung cancer are greater if the cancer is diagnosed early.  What are the risks of screening?  The screening may show lung cancer when no cancer is present (false-positive result).  The screening may not find lung cancer when it is present.  The person gets exposed to radiation.  How can I lower my risk of lung cancer?  Make these lifestyle changes to lower your risk of developing lung cancer:  Do not use any products that contain nicotine or tobacco, such as cigarettes, e-cigarettes, and chewing tobacco. If you need help quitting, ask your health care provider.  Avoid secondhand smoke.  Avoid exposure to radiation.  Avoid exposure to radon gas. Have your home checked for radon regularly.  Avoid things that cause cancer (carcinogens).  Avoid living or working in places with high air pollution.  Questions to ask your health care provider  Am I eligible for lung cancer screening?  Does my health insurance cover the cost of lung cancer screening?  What happens if the lung cancer screening shows something of concern?  How soon will I have results from my lung cancer screening?  Is there anything that I need to do to prepare for my lung cancer screening?  What happens if I decide not to have lung cancer screening?  Where to find more information  Ask your health care provider about the risks and benefits of screening. More information and resources are available from these organizations:  American Cancer Society (ACS): www.cancer.org  American Lung Association: www.lung.org  Contact a health care provider if:  You start to show symptoms of lung cancer, including:  Coughing that will  not go away.  Making whistling sounds when you breathe (wheezing).  Chest pain.  Coughing up blood.  Shortness of breath.  Weight loss that cannot be explained.  Constant tiredness (fatigue).  Hoarse voice.  Summary  Lung cancer screening may find lung cancer before symptoms appear. Finding cancer early improves the chances of successful treatment. It may save your life.  The recommended screening test is a low-dose computed tomography (LDCT) scan that looks for abnormal cells in the lungs. If you are at risk for lung cancer, it is recommended that you get screened once a year.  You can make lifestyle changes to lower your risk of lung cancer.  Ask your health care provider about the risks and benefits of screening.  This information is not intended to replace advice given to you by your health care provider. Make sure you discuss any questions you have with your health care provider.  Document Revised: 05/10/2021 Document Reviewed: 12/15/2020  [employer] Patient Education ©  [employer]      Medicare Wellness  Personal Prevention Plan of Service     Date of Office Visit:    Encounter Provider:  GABRIELLA Escobar  Place of Service:  [facility]  Patient Name: Orin Munoz  :  1955    As part of the Medicare Wellness portion of your visit today, we are providing you with this personalized preventive plan of services (PPPS). This plan is based upon recommendations of the [place] Preventive Services Task Force (USPSTF) and the Advisory Committee on Immunization Practices (ACIP).    This lists the preventive care services that should be considered, and provides dates of when you are due. Items listed as completed are up-to-date and do not require any further intervention.    Health Maintenance   Topic Date Due    MAMMOGRAM  2024    LUNG CANCER SCREENING  2024    INFLUENZA VACCINE  2025 (Originally 2024)    COVID-19 Vaccine (4 - 2023-24 season)  09/12/2025 (Originally 9/1/2024)    TDAP/TD VACCINES (1 - Tdap) 09/12/2025 (Originally [birth year]/8/1974)    ZOSTER VACCINE (1 of 2) 09/12/2025 (Originally 6/8/2003)    LIPID PANEL  12/08/2024    ANNUAL WELLNESS VISIT  09/12/2025    DXA SCAN  05/31/2026    COLORECTAL CANCER SCREENING  08/08/2026    HEPATITIS C SCREENING  Completed    Pneumococcal Vaccine 65+  Completed       Orders Placed This Encounter   Procedures     CT Chest Low Dose Cancer Screening WO     Standing Status:   Future     Order Specific Question:   The patient is age 50-80 (Medicare coverage 50-77)     Answer:   69     Order Specific Question:   The patient is a current smoker?     Answer:   No     Order Specific Question:   Is the patient a former smoker who has quit within the last 15 years? (If the answer to this is no they do not meet criteria for this exam)     Answer:   Yes     Order Specific Question:   The number of years since quitting smoking.     Answer:   6     Order Specific Question:   Does the patient have a smoking history of 20 pack-years or greater? (If the answer to this is no they do not meet criteria for this exam)     Answer:   Yes     Order Specific Question:   Actual pack - year smoking history (number):     Answer:   30     Order Specific Question:   Does the patient have any clinical signs/symptoms of lung cancer?     Answer:   No     Order Specific Question:   The patient was engaged in shared decision-making for this test:     Answer:   Yes    Mammo Screening Digital Tomosynthesis Bilateral With CAD     Standing Status:   Future     Standing Expiration Date:   9/12/2025     Order Specific Question:   Reason for Exam:     Answer:   screening     Order Specific Question:   Does this patient have a diabetic monitoring/medication delivering device on?     Answer:   No     Order Specific Question:   Release to patient     Answer:   Routine Release [[id number]]       No follow-ups on file.

## 2024-09-13 ENCOUNTER — TELEPHONE (OUTPATIENT)
Dept: FAMILY MEDICINE CLINIC | Facility: CLINIC | Age: 69
End: 2024-09-13

## 2024-09-13 ENCOUNTER — TELEPHONE (OUTPATIENT)
Dept: FAMILY MEDICINE CLINIC | Facility: CLINIC | Age: 69
End: 2024-09-13
Payer: MEDICARE

## 2024-09-13 NOTE — TELEPHONE ENCOUNTER
Caller: COLETTE    Relationship:     Best call back number: [phone]    What form or medical record are you requesting: PRIOR AUTHORIZATION    Who is requesting this form or medical record from you: COLETTE    How would you like to receive the form or medical records (pick-up, mail, fax): FAX  Timeframe paperwork needed: ASAP    Additional notes: PHARMACY IS CALLING TO CHECK ON STATUS OF PRIOR AUTHORIZATION FOR REPATHA.

## 2024-09-13 NOTE — TELEPHONE ENCOUNTER
Caller: Orin Munoz    Relationship: Self    Best call back number: [phone]       What was the call regarding: NEEDING TO CHECK ON SCHEDULING FOR THE MAMMOGRAM AND LOW DOSE CT. WANTS THESE IN [place]. PLEASE CALL TO LET HER KNOW ABOUT SCHEDULING

## 2024-09-16 NOTE — TELEPHONE ENCOUNTER
PATIENT MADE CONTACT TO CHECK THE STATUS OF ORDERS INCLUDING:    CT SCAN FOR LUNG CANCER SCREENING  MAMMOGRAM    PATIENT REQUESTED TESTS BE COMPLETED IN [place] INSTEAD OF [place] IF POSSIBLE

## 2024-09-23 ENCOUNTER — OFFICE VISIT (OUTPATIENT)
Dept: CARDIAC SURGERY | Facility: CLINIC | Age: 69
End: 2024-09-23
Payer: MEDICARE

## 2024-09-23 VITALS
DIASTOLIC BLOOD PRESSURE: 82 MMHG | HEIGHT: 64 IN | BODY MASS INDEX: 25.1 KG/M2 | OXYGEN SATURATION: 95 % | HEART RATE: 88 BPM | SYSTOLIC BLOOD PRESSURE: 146 MMHG | TEMPERATURE: 98.6 F | WEIGHT: 147 LBS

## 2024-09-23 DIAGNOSIS — I71.43 INFRARENAL ABDOMINAL AORTIC ANEURYSM (AAA) WITHOUT RUPTURE: Primary | ICD-10-CM

## 2024-09-23 DIAGNOSIS — I73.9 PAD (PERIPHERAL ARTERY DISEASE): ICD-10-CM

## 2024-09-23 NOTE — H&P (VIEW-ONLY)
09/23/2024  Patient Information  Orin Munoz                                                                                          [address] [place] [address]   1955  'PCP/Referring Physician'  Yomaira Saldana PA  [phone]  No ref. provider found    Chief Complaint   Patient presents with    Consult     New pt per DR. Mota for consults for AAA w/o rupture and PAD. Pt states that her lower back hurts across the belt line and that pain is also radiating down her left leg into her calf. Complains of bilateral cold feet some numbness in both feet w/ some discoloration of the toes. SOB with exertion.s       History of Present Illness:   The patient is a 69-year-old female who was referred to me for both peripheral vascular disease and an infrarenal aortic aneurysm.  In questioning the patient, she has had hip pain and leg pain for 18 months, possibly 2 years.  When she walks she has some cramping in the left calf and generalized weakness in both and fatigue in the thigh and buttocks bilaterally.  She has no rest pain and no evidence of slow or nonhealing ulcers but she had always thought she had had sciatica of the left hip.  She has multiple other medical issues that are outlined within the history and physical. In reviewing her CT scan, it appears she has an occluded left common iliac artery and an infrarenal aortic aneurysm with some degree of the left renal artery stenosis which is somewhat difficult to quantify by CT scan.  She is here to discuss these issues.      Patient Active Problem List   Diagnosis    Asthma    Low back pain    Lumbar radiculopathy    Spondylolisthesis    Simple chronic bronchitis    History of fractured pelvis    Primary hypertension    Hyperlipidemia    Recurrent major depressive disorder, in remission    Generalized anxiety disorder    PVD (peripheral vascular disease) with claudication    Medicare annual wellness visit, initial    Sciatica of left side    Heart  murmur    Lumbar spondylosis    COPD (chronic obstructive pulmonary disease)    Carotid bruit    Thoracoabdominal aortic aneurysm (TAAA) without rupture    Stage 3b chronic kidney disease    High risk medication use    Rib pain on right side    Acute pain of left knee    Chronic foot pain    Menopause    Medicare annual wellness visit, subsequent    Coronary artery calcification seen on CT scan    Abnormal EKG    History of bilateral cataract extraction    History of cholecystectomy    History of total hysterectomy    Gout    Multiple nodules of lung    Neuropathy    Age-related osteoporosis without current pathological fracture    Claudication of left lower extremity    Personal history of tobacco use    Infrarenal abdominal aortic aneurysm (AAA) without rupture    Left renal artery stenosis     Past Medical History:   Diagnosis Date    Alcoholism     Allergic 2023    Penicillin,alupent    Ankle sprain     N/A    Arthritis 15 years ago    Arthritis of back Yrs.ago    N/A    Arthritis of neck Last year    Asthma 20 years ago    Cataract 2021    Chronic pain disorder     COPD (chronic obstructive pulmonary disease) 2015    Coronary artery disease 2018    Acute pulmonary hemmorage of the abdomen    Fracture, foot 2018    Had surgery    Hip arthrosis Years ago    Hyperlipidemia 2015    Hypertension 2000    Knee sprain 2023    Knee swelling A long time    Low back pain N/A    Low back strain Last few years    Was told i had osteo    Osteopenia 2018    Osteoporosis     Periarthritis of shoulder Last year    Peripheral neuropathy     Pneumonia 2022    Hospitalized for 4 days.    Renal insufficiency 2022    Scoliosis Last year    Shingles 2009    Visual impairment 1958    Wrist sprain 2018     Past Surgical History:   Procedure Laterality Date    APPENDECTOMY  1971    CHOLECYSTECTOMY  2015    COLONOSCOPY  2016    EYE SURGERY  2020    Cataract    FOOT SURGERY  2018     done surgery    HYSTERECTOMY  18 years old        Current Outpatient Medications:     albuterol sulfate  (90 Base) MCG/ACT inhaler, Inhale 2 puffs Every 4 (Four) Hours As Needed for Wheezing., Disp: 10.8 g, Rfl: 1    allopurinol (Zyloprim) 300 MG tablet, Take 1 tablet by mouth Daily., Disp: 90 tablet, Rfl: 1    amLODIPine (NORVASC) 10 MG tablet, Take 1 tablet by mouth Daily., Disp: 90 tablet, Rfl: 1    aspirin 81 MG EC tablet, Take 1 tablet by mouth Daily., Disp: , Rfl:     atorvastatin (LIPITOR) 80 MG tablet, Take 1 tablet by mouth Daily., Disp: 90 tablet, Rfl: 3    escitalopram (LEXAPRO) 10 MG tablet, Take 1 tablet by mouth Daily., Disp: 90 tablet, Rfl: 1    Evolocumab (REPATHA) solution auto-injector SureClick injection, Inject 1 mL under the skin into the appropriate area as directed Every 14 (Fourteen) Days., Disp: 6 mL, Rfl: 4    ibandronate (BONIVA) 150 MG tablet, Take 1 tablet by mouth Every 30 (Thirty) Days., Disp: 3 tablet, Rfl: 4    icosapent ethyl (Vascepa) 1 g capsule capsule, Take 2 g by mouth 2 (Two) Times a Day With Meals., Disp: 120 capsule, Rfl: 11    lisinopril (PRINIVIL,ZESTRIL) 30 MG tablet, Take 1 tablet by mouth Every 12 (Twelve) Hours., Disp: 180 tablet, Rfl: 3    nebivolol (BYSTOLIC) 5 MG tablet, Take 1 tablet by mouth Daily., Disp: 90 tablet, Rfl: 1    tiotropium bromide-olodaterol (Stiolto Respimat) 2.5-2.5 MCG/ACT aerosol solution inhaler, Inhale 2 puffs Daily., Disp: 12 g, Rfl: 1  Allergies   Allergen Reactions    Penicillins Hives    Metaproterenol Other (See Comments)    Other Other (See Comments)     Alupent    Ipratropium Bromide Anxiety     Social History     Socioeconomic History    Marital status:    Tobacco Use    Smoking status: Former     Current packs/day: 0.00     Average packs/day: 1 pack/day for 30.3 years (30.3 ttl pk-yrs)     Types: Cigarettes     Start date: 1988     Quit date: 2018     Years since quittin.3    Smokeless tobacco: Never   Vaping Use    Vaping status: Never Used    Substance and Sexual Activity    Alcohol use: Yes     Alcohol/week: 3.0 standard drinks of alcohol     Types: 3 Shots of liquor per week    Drug use: Not Currently     Types: Marijuana    Sexual activity: Not Currently     Partners: Male     Birth control/protection: Condom, Birth control pill, Hysterectomy     Family History   Problem Relation Age of Onset    Arthritis Mother     COPD Mother     Heart disease Mother     Hyperlipidemia Mother     Diabetes Maternal Grandmother     Osteoporosis Maternal Grandmother         In lower back    Alcohol abuse Brother         Passed away in 2021    Drug abuse Brother     Cancer Brother         Retnal cancer    Early death Brother         Retinal cancer    Heart disease Sister      Review of Systems   Constitutional: Negative for chills, decreased appetite, fever, malaise/fatigue, weight gain and weight loss.   HENT:  Negative for hearing loss.    Cardiovascular:  Negative for chest pain, claudication, cyanosis, dyspnea on exertion, irregular heartbeat, leg swelling, near-syncope, orthopnea, palpitations, paroxysmal nocturnal dyspnea and syncope.   Endocrine: Negative for polydipsia, polyphagia and polyuria.   Hematologic/Lymphatic: Negative for adenopathy. Does not bruise/bleed easily.   Skin:  Positive for color change.   Musculoskeletal:  Positive for back pain and muscle cramps. Negative for arthritis, falls, gout, joint pain, joint swelling, muscle weakness and myalgias.   Gastrointestinal:  Negative for abdominal pain, anorexia, dysphagia, heartburn, nausea and vomiting.   Genitourinary:  Negative for dysuria and hematuria.   Neurological:  Positive for numbness. Negative for dizziness, focal weakness, headaches, loss of balance, seizures, vertigo and weakness.   Psychiatric/Behavioral:  Negative for altered mental status, depression, substance abuse and suicidal ideas. The patient is not nervous/anxious.    Allergic/Immunologic: Negative for HIV exposure and  "persistent infections.     Vitals:    09/23/24 1045 09/23/24 1046   BP: 148/88 146/82   BP Location: Left arm Right arm   Pulse: 88    Temp: 98.6 °F (37 °C)    SpO2: 95%    Weight: 66.7 kg (147 lb)    Height: 162.6 cm (64\")       Physical Exam   CONSTITUTIONAL: Alert and conversant, Well dressed, Well nourished, No acute distress  EYES: Sclera clean, Anicteric, Pupils equal  ENT: No nasal deviation, Trachea midline  NECK: No neck masses, Supple  LUNGS: No wheezing, Cough, non-congested  HEART: No rubs, No murmurs  GASTROINTESTINAL: Soft, non-distended, No masses, Non tender  to palpation, normal bowel sounds  NEURO: No motor deficits, No sensory deficits, Cranial Nerves 2 through 12 grossly intact  PSYCHIATRIC: Oriented to person, place and time, No memory deficits, Mood appropriate  VASCULAR: No carotid bruits, the left femoral pulse is nonpalpable.  Both feet are warm and viable the left foot is somewhat cooler than the right and has some degree of hyperemia  MUSKULOSKELETAL: No extremity trauma.  There is a marked asymmetry of the calf muscles bilaterally with the left calf muscle demonstrating significant atrophy when compared to the right    The ROS, past medical history, surgical history, family history, social history, and vitals were reviewed by myself and corrected as needed.      Labs/Imaging:  I have reviewed the CT angiogram demonstrating the left iliac occlusion as well as the infrarenal aortic aneurysm. Smoking cessation was not discussed as she discontinued smoking in 2018. The patient has no advance directive at this time.       Assessment/Plan:   The patient is a 69-year-old female who has a number of medical problems which include at least 28 different diagnoses as well as at least 6 different surgeries. At present, her main issues are a left iliac artery occlusion and I believe this has been present for at least 18 months to 2 years that she has had her hip and low back pain.  She has developed " atrophy of the left calf muscle from poor blood flow.  In addition, she has an infrarenal aortic aneurysm with mural thrombus in the thin wall.  There is also some degree of left renal artery stenosis and she is on 3 blood pressure medications. I spoke with the patient and her sister at length about treatment plan and various options.  I used the diagrams that were printed in our office as well as a hand-drawn diagram of her anatomy.  I indicated that if I am able to open the chronic occlusion on the left iliac with catheter-based means, we could proceed with endovascular repair for infrarenal aneurysm and this would open up and stent the left iliac occlusion in 1 procedure.  However, if I am unable to open the chronically occluded left iliac, we could proceed with an aorto uni iliac procedure.  I explained that this would involve an endovascular graft to repair the infrarenal aortic aneurysm and direct blood flow into the right leg and at the same setting we would perform a bypass from the right femoral to the left femoral artery to restore blood flow into the left leg.  I drew them a diagram to explain this.  I also indicated that arteriogram would more carefully evaluate the left renal artery and that because she is on 3 blood pressure medications we would probably correct this with a stent at the same time. The patient and sister seemed happy with this explanation and we told them that we would try to schedule the surgery in the next few weeks because this has been a chronic problem and is not an acute emergency. However, at this time, a second sister arrived to the office late when our meeting was over and I had to explain the entire anatomy and entire complicated procedure and redraw the drawings once again for the second sister.  Following this, the second sister had additional questions, the nature of which, made me feel that she did not trust my judgment or my plan.  At this point, I recommended it might be  better to get a second opinion or to find another surgeon and I could facilitate that.  I will contact her family physician Dr. Javier Mota, in [place], to explain the situation; because I thought my relationship with the patient and the first sister was good and I felt we had a sound plan and that they were agreeable and willing to proceed.  Unfortunately, the second sister that arrived late, seemed very untrusting and essentially tainted my relationship with the patient.      ADDENDUM:  On 9/25/24 advised  to call patient and proceed with scheduled surgery if patient is agreeable and comfortable.     Patient Active Problem List   Diagnosis    Asthma    Low back pain    Lumbar radiculopathy    Spondylolisthesis    Simple chronic bronchitis    History of fractured pelvis    Primary hypertension    Hyperlipidemia    Recurrent major depressive disorder, in remission    Generalized anxiety disorder    PVD (peripheral vascular disease) with claudication    Medicare annual wellness visit, initial    Sciatica of left side    Heart murmur    Lumbar spondylosis    COPD (chronic obstructive pulmonary disease)    Carotid bruit    Thoracoabdominal aortic aneurysm (TAAA) without rupture    Stage 3b chronic kidney disease    High risk medication use    Rib pain on right side    Acute pain of left knee    Chronic foot pain    Menopause    Medicare annual wellness visit, subsequent    Coronary artery calcification seen on CT scan    Abnormal EKG    History of bilateral cataract extraction    History of cholecystectomy    History of total hysterectomy    Gout    Multiple nodules of lung    Neuropathy    Age-related osteoporosis without current pathological fracture    Claudication of left lower extremity    Personal history of tobacco use    Infrarenal abdominal aortic aneurysm (AAA) without rupture    Left renal artery stenosis       CC: GABRIELLA Escobar editing for Aaron Villarreal MD        I, Aaron Villarreal MD, have read and agree with the editing done by Amanda Tirado, .

## 2024-09-24 ENCOUNTER — TELEPHONE (OUTPATIENT)
Dept: CARDIAC SURGERY | Facility: CLINIC | Age: 69
End: 2024-09-24

## 2024-09-24 NOTE — TELEPHONE ENCOUNTER
Caller: Orin Munoz     Relationship: SELF    Best call back number: [phone]    What is your medical concern? PT WAS SEEN 9.23.24 AND THE APPT WAS REGARDING SURGERY, HER SISTER WAS QUESTIONING THINGS, THE PT WOULD LIKE TO PROCEED WITH THE SURGERY. PLEASE REACH OUT TO PT TO DISCUSS NEXT STEPS. PT TRUSTS DR OSORIO AND WOULD LIKE TO PROCEED    How long has this issue been going on? 9.23.24    Is your provider already aware of this issue? YES    Have you been treated for this issue? YES

## 2024-09-25 ENCOUNTER — TELEPHONE (OUTPATIENT)
Dept: CARDIAC SURGERY | Facility: CLINIC | Age: 69
End: 2024-09-25
Payer: MEDICARE

## 2024-09-30 ENCOUNTER — PREP FOR SURGERY (OUTPATIENT)
Dept: OTHER | Facility: HOSPITAL | Age: 69
End: 2024-09-30
Payer: MEDICARE

## 2024-09-30 DIAGNOSIS — I71.60 THORACOABDOMINAL AORTIC ANEURYSM (TAAA) WITHOUT RUPTURE, UNSPECIFIED PART: Primary | ICD-10-CM

## 2024-09-30 DIAGNOSIS — I71.43 INFRARENAL ABDOMINAL AORTIC ANEURYSM (AAA) WITHOUT RUPTURE: Primary | ICD-10-CM

## 2024-10-01 RX ORDER — CHLORHEXIDINE GLUCONATE 500 MG/1
1 CLOTH TOPICAL EVERY 12 HOURS PRN
OUTPATIENT
Start: 2024-10-09

## 2024-10-09 ENCOUNTER — HOSPITAL ENCOUNTER (OUTPATIENT)
Dept: GENERAL RADIOLOGY | Facility: HOSPITAL | Age: 69
Discharge: HOME | End: 2024-10-09
Payer: MEDICARE

## 2024-10-09 ENCOUNTER — PRE-ADMISSION TESTING (OUTPATIENT)
Dept: PREADMISSION TESTING | Facility: HOSPITAL | Age: 69
DRG: 269 | End: 2024-10-09
Payer: MEDICARE

## 2024-10-09 VITALS — OXYGEN SATURATION: 96 % | WEIGHT: 145.83 LBS | BODY MASS INDEX: 24.9 KG/M2 | HEIGHT: 64 IN

## 2024-10-09 DIAGNOSIS — I71.43 INFRARENAL ABDOMINAL AORTIC ANEURYSM (AAA) WITHOUT RUPTURE: ICD-10-CM

## 2024-10-09 LAB
ABO GROUP BLD: NORMAL
AMPHET+METHAMPHET UR QL: NEGATIVE
AMPHETAMINES UR QL: NEGATIVE
ANION GAP SERPL CALCULATED.3IONS-SCNC: 10 MMOL/L (ref 5–15)
APTT PPP: 27.7 SECONDS (ref 22–39)
BARBITURATES UR QL SCN: NEGATIVE
BASOPHILS # BLD AUTO: 0.03 10*3/MM3 (ref 0–0.2)
BASOPHILS NFR BLD AUTO: 0.4 % (ref 0–1.5)
BENZODIAZ UR QL SCN: NEGATIVE
BLD GP AB SCN SERPL QL: NEGATIVE
BUN SERPL-MCNC: 16 MG/DL (ref 8–23)
BUN/CREAT SERPL: 12.6 (ref 7–25)
BUPRENORPHINE SERPL-MCNC: NEGATIVE NG/ML
CALCIUM SPEC-SCNC: 8.8 MG/DL (ref 8.6–10.5)
CANNABINOIDS SERPL QL: NEGATIVE
CHLORIDE SERPL-SCNC: 104 MMOL/L (ref 98–107)
CO2 SERPL-SCNC: 25 MMOL/L (ref 22–29)
COCAINE UR QL: NEGATIVE
CREAT SERPL-MCNC: 1.27 MG/DL (ref 0.57–1)
DEPRECATED RDW RBC AUTO: 48.3 FL (ref 37–54)
EGFRCR SERPLBLD CKD-EPI 2021: 45.3 ML/MIN/1.73
EOSINOPHIL # BLD AUTO: 0.07 10*3/MM3 (ref 0–0.4)
EOSINOPHIL NFR BLD AUTO: 0.8 % (ref 0.3–6.2)
ERYTHROCYTE [DISTWIDTH] IN BLOOD BY AUTOMATED COUNT: 14.4 % (ref 12.3–15.4)
FENTANYL UR-MCNC: NEGATIVE NG/ML
GLUCOSE SERPL-MCNC: 110 MG/DL (ref 65–99)
HBA1C MFR BLD: 5.2 % (ref 4.8–5.6)
HCT VFR BLD AUTO: 38.9 % (ref 34–46.6)
HGB BLD-MCNC: 12.5 G/DL (ref 12–15.9)
IMM GRANULOCYTES # BLD AUTO: 0.01 10*3/MM3 (ref 0–0.05)
IMM GRANULOCYTES NFR BLD AUTO: 0.1 % (ref 0–0.5)
INR PPP: 0.97 (ref 0.89–1.12)
LYMPHOCYTES # BLD AUTO: 1.08 10*3/MM3 (ref 0.7–3.1)
LYMPHOCYTES NFR BLD AUTO: 13 % (ref 19.6–45.3)
MCH RBC QN AUTO: 29.7 PG (ref 26.6–33)
MCHC RBC AUTO-ENTMCNC: 32.1 G/DL (ref 31.5–35.7)
MCV RBC AUTO: 92.4 FL (ref 79–97)
METHADONE UR QL SCN: NEGATIVE
MONOCYTES # BLD AUTO: 0.57 10*3/MM3 (ref 0.1–0.9)
MONOCYTES NFR BLD AUTO: 6.9 % (ref 5–12)
NEUTROPHILS NFR BLD AUTO: 6.54 10*3/MM3 (ref 1.7–7)
NEUTROPHILS NFR BLD AUTO: 78.8 % (ref 42.7–76)
NRBC BLD AUTO-RTO: 0 /100 WBC (ref 0–0.2)
OPIATES UR QL: NEGATIVE
OXYCODONE UR QL SCN: NEGATIVE
PCP UR QL SCN: NEGATIVE
PLATELET # BLD AUTO: 274 10*3/MM3 (ref 140–450)
PMV BLD AUTO: 10.3 FL (ref 6–12)
POTASSIUM SERPL-SCNC: 4.2 MMOL/L (ref 3.5–5.2)
PROTHROMBIN TIME: 13 SECONDS (ref 12.2–14.5)
RBC # BLD AUTO: 4.21 10*6/MM3 (ref 3.77–5.28)
RH BLD: POSITIVE
SODIUM SERPL-SCNC: 139 MMOL/L (ref 136–145)
T&S EXPIRATION DATE: NORMAL
TRICYCLICS UR QL SCN: NEGATIVE
WBC NRBC COR # BLD AUTO: 8.3 10*3/MM3 (ref 3.4–10.8)

## 2024-10-09 PROCEDURE — 80307 DRUG TEST PRSMV CHEM ANLYZR: CPT

## 2024-10-09 PROCEDURE — 80305 DRUG TEST PRSMV DIR OPT OBS: CPT

## 2024-10-09 PROCEDURE — 85025 COMPLETE CBC W/AUTO DIFF WBC: CPT

## 2024-10-09 PROCEDURE — 86900 BLOOD TYPING SEROLOGIC ABO: CPT

## 2024-10-09 PROCEDURE — 85730 THROMBOPLASTIN TIME PARTIAL: CPT

## 2024-10-09 PROCEDURE — 80048 BASIC METABOLIC PNL TOTAL CA: CPT

## 2024-10-09 PROCEDURE — 86901 BLOOD TYPING SEROLOGIC RH(D): CPT

## 2024-10-09 PROCEDURE — 85610 PROTHROMBIN TIME: CPT

## 2024-10-09 PROCEDURE — 36415 COLL VENOUS BLD VENIPUNCTURE: CPT

## 2024-10-09 PROCEDURE — 71046 X-RAY EXAM CHEST 2 VIEWS: CPT

## 2024-10-09 PROCEDURE — 86923 COMPATIBILITY TEST ELECTRIC: CPT

## 2024-10-09 PROCEDURE — 83036 HEMOGLOBIN GLYCOSYLATED A1C: CPT

## 2024-10-09 PROCEDURE — 86850 RBC ANTIBODY SCREEN: CPT

## 2024-10-09 RX ORDER — CHLORHEXIDINE GLUCONATE 500 MG/1
1 CLOTH TOPICAL EVERY 12 HOURS PRN
Status: DISCONTINUED | OUTPATIENT
Start: 2024-10-09 | End: 2024-10-21

## 2024-10-09 NOTE — DISCHARGE INSTRUCTIONS
Per Anesthesia Request, patient instructed not to take their ACE/ARB medications on the AM of surgery.     Bactroban to be applied to each nostril during PAT visit if surgery the following day.  If surgery NOT the following day, then Bactroban supplied to patient with instructions both written and verbally to insert Bactroban into each nares the night before surgery.     Patient to apply Chlorhexadine wipes  to surgical area (as instructed) the night before procedure and the AM of procedure. Wipes provided.     Patient viewed general PAT education video as instructed in their preoperative information received from their surgeon.  Patient stated the general PAT education video was viewed in its entirety and survey completed.  Copies of PAT general education handouts (Incentive Spirometry, Meds to Beds Program, Patient Belongings, Pre-op skin preparation instructions, Blood Glucose testing, Visitor policy, Surgery FAQ, Code H) distributed to patient if not printed. Education related to the PAT pass and skin preparation for surgery (if applicable) completed in PAT as a reinforcement to PAT education video. Patient instructed to return PAT pass provided today as well as completed skin preparation sheet (if applicable) on the day of procedure.     Additionally if patient had not viewed video yet but intended to view it at home or in our waiting area, then referred them to the handout with QR code/link provided during PAT visit.  Encouraged patient/family to read PAT general education handouts thoroughly and notify PAT staff with any questions or concerns. Patient verbalized understanding of all information and priority content.

## 2024-10-09 NOTE — PAT
Patient directed to Radiology Department for CXR after Pre Admission Testing Appointment.      Will need to sign permit in preop after order clarification     An arrival time for procedure was not provided during PAT visit. If patient had any questions or concerns about their arrival time, they were instructed to contact their surgeon/physician.  Additionally, if the patient referred to an arrival time that was acquired from their my chart account, patient was encouraged to verify that time with their surgeon/physician. Arrival times are NOT provided in Pre Admission Testing Department.     Patient denies any current skin issues.      Per Anesthesia Request, patient instructed not to take their ACE/ARB medications on the AM of surgery.     Bactroban to be applied to each nostril during PAT visit if surgery the following day.  If surgery NOT the following day, then Bactroban supplied to patient with instructions both written and verbally to insert Bactroban into each nares the night before surgery.     Patient to apply Chlorhexadine wipes  to surgical area (as instructed) the night before procedure and the AM of procedure. Wipes provided.

## 2024-10-10 ENCOUNTER — HOSPITAL ENCOUNTER (INPATIENT)
Facility: HOSPITAL | Age: 69
LOS: 3 days | Discharge: HOME | DRG: 269 | End: 2024-10-13
Attending: THORACIC SURGERY (CARDIOTHORACIC VASCULAR SURGERY) | Admitting: THORACIC SURGERY (CARDIOTHORACIC VASCULAR SURGERY)
Payer: MEDICARE

## 2024-10-10 ENCOUNTER — ANESTHESIA (OUTPATIENT)
Dept: PERIOP | Facility: HOSPITAL | Age: 69
End: 2024-10-10
Payer: MEDICARE

## 2024-10-10 ENCOUNTER — ANESTHESIA EVENT (OUTPATIENT)
Dept: PERIOP | Facility: HOSPITAL | Age: 69
End: 2024-10-10
Payer: MEDICARE

## 2024-10-10 ENCOUNTER — APPOINTMENT (OUTPATIENT)
Dept: GENERAL RADIOLOGY | Facility: HOSPITAL | Age: 69
DRG: 269 | End: 2024-10-10
Payer: MEDICARE

## 2024-10-10 DIAGNOSIS — I71.43 INFRARENAL ABDOMINAL AORTIC ANEURYSM (AAA) WITHOUT RUPTURE: ICD-10-CM

## 2024-10-10 DIAGNOSIS — I71.60 THORACOABDOMINAL AORTIC ANEURYSM (TAAA) WITHOUT RUPTURE, UNSPECIFIED PART: ICD-10-CM

## 2024-10-10 PROBLEM — I71.40 AAA (ABDOMINAL AORTIC ANEURYSM): Status: ACTIVE | Noted: 2024-10-10

## 2024-10-10 LAB
ABO GROUP BLD: NORMAL
COTININE UR QL SCN: NEGATIVE NG/ML
D-LACTATE SERPL-SCNC: 0.7 MMOL/L (ref 0.5–2)
GLUCOSE BLDC GLUCOMTR-MCNC: 88 MG/DL (ref 70–130)
Lab: NORMAL
RH BLD: POSITIVE

## 2024-10-10 PROCEDURE — 25010000002 PROTAMINE SULFATE PER 10 MG: Performed by: NURSE ANESTHETIST, CERTIFIED REGISTERED

## 2024-10-10 PROCEDURE — 25010000002 ONDANSETRON PER 1 MG: Performed by: NURSE ANESTHETIST, CERTIFIED REGISTERED

## 2024-10-10 PROCEDURE — 25010000002 VANCOMYCIN 1 G RECONSTITUTED SOLUTION 1 EACH VIAL: Performed by: PHYSICIAN ASSISTANT

## 2024-10-10 PROCEDURE — 041K0JJ BYPASS RIGHT FEMORAL ARTERY TO LEFT FEMORAL ARTERY WITH SYNTHETIC SUBSTITUTE, OPEN APPROACH: ICD-10-PCS | Performed by: THORACIC SURGERY (CARDIOTHORACIC VASCULAR SURGERY)

## 2024-10-10 PROCEDURE — C1887 CATHETER, GUIDING: HCPCS | Performed by: THORACIC SURGERY (CARDIOTHORACIC VASCULAR SURGERY)

## 2024-10-10 PROCEDURE — 25510000001 IOPAMIDOL 61 % SOLUTION: Performed by: THORACIC SURGERY (CARDIOTHORACIC VASCULAR SURGERY)

## 2024-10-10 PROCEDURE — 25010000002 GENTAMICIN PER 80 MG: Performed by: THORACIC SURGERY (CARDIOTHORACIC VASCULAR SURGERY)

## 2024-10-10 PROCEDURE — 25010000002 LIDOCAINE PF 1% 1 % SOLUTION: Performed by: NURSE ANESTHETIST, CERTIFIED REGISTERED

## 2024-10-10 PROCEDURE — 25010000002 FENTANYL CITRATE (PF) 50 MCG/ML SOLUTION

## 2024-10-10 PROCEDURE — 86900 BLOOD TYPING SEROLOGIC ABO: CPT

## 2024-10-10 PROCEDURE — 83605 ASSAY OF LACTIC ACID: CPT | Performed by: PHYSICIAN ASSISTANT

## 2024-10-10 PROCEDURE — 25010000002 SUGAMMADEX 200 MG/2ML SOLUTION: Performed by: NURSE ANESTHETIST, CERTIFIED REGISTERED

## 2024-10-10 PROCEDURE — 25010000002 MORPHINE PER 10 MG: Performed by: PHYSICIAN ASSISTANT

## 2024-10-10 PROCEDURE — 88304 TISSUE EXAM BY PATHOLOGIST: CPT | Performed by: THORACIC SURGERY (CARDIOTHORACIC VASCULAR SURGERY)

## 2024-10-10 PROCEDURE — C1889 IMPLANT/INSERT DEVICE, NOC: HCPCS | Performed by: THORACIC SURGERY (CARDIOTHORACIC VASCULAR SURGERY)

## 2024-10-10 PROCEDURE — C1769 GUIDE WIRE: HCPCS | Performed by: THORACIC SURGERY (CARDIOTHORACIC VASCULAR SURGERY)

## 2024-10-10 PROCEDURE — 25010000002 VANCOMYCIN 1 G RECONSTITUTED SOLUTION 1 EACH VIAL: Performed by: THORACIC SURGERY (CARDIOTHORACIC VASCULAR SURGERY)

## 2024-10-10 PROCEDURE — 04CK0ZZ EXTIRPATION OF MATTER FROM RIGHT FEMORAL ARTERY, OPEN APPROACH: ICD-10-PCS | Performed by: THORACIC SURGERY (CARDIOTHORACIC VASCULAR SURGERY)

## 2024-10-10 PROCEDURE — C1768 GRAFT, VASCULAR: HCPCS | Performed by: THORACIC SURGERY (CARDIOTHORACIC VASCULAR SURGERY)

## 2024-10-10 PROCEDURE — 25010000002 FENTANYL CITRATE (PF) 100 MCG/2ML SOLUTION: Performed by: NURSE ANESTHETIST, CERTIFIED REGISTERED

## 2024-10-10 PROCEDURE — C1894 INTRO/SHEATH, NON-LASER: HCPCS | Performed by: THORACIC SURGERY (CARDIOTHORACIC VASCULAR SURGERY)

## 2024-10-10 PROCEDURE — 82948 REAGENT STRIP/BLOOD GLUCOSE: CPT

## 2024-10-10 PROCEDURE — 25810000003 LACTATED RINGERS PER 1000 ML: Performed by: NURSE ANESTHETIST, CERTIFIED REGISTERED

## 2024-10-10 PROCEDURE — 25010000002 HEPARIN (PORCINE) PER 1000 UNITS: Performed by: THORACIC SURGERY (CARDIOTHORACIC VASCULAR SURGERY)

## 2024-10-10 PROCEDURE — 25010000002 PROPOFOL 10 MG/ML EMULSION: Performed by: NURSE ANESTHETIST, CERTIFIED REGISTERED

## 2024-10-10 PROCEDURE — 25010000002 HEPARIN (PORCINE) PER 1000 UNITS: Performed by: NURSE ANESTHETIST, CERTIFIED REGISTERED

## 2024-10-10 PROCEDURE — 25010000002 LABETALOL 5 MG/ML SOLUTION: Performed by: NURSE ANESTHETIST, CERTIFIED REGISTERED

## 2024-10-10 PROCEDURE — 25810000003 SODIUM CHLORIDE 0.9 % SOLUTION 250 ML FLEX CONT: Performed by: PHYSICIAN ASSISTANT

## 2024-10-10 PROCEDURE — C2615 SEALANT, PULMONARY, LIQUID: HCPCS | Performed by: THORACIC SURGERY (CARDIOTHORACIC VASCULAR SURGERY)

## 2024-10-10 PROCEDURE — 99222 1ST HOSP IP/OBS MODERATE 55: CPT | Performed by: INTERNAL MEDICINE

## 2024-10-10 PROCEDURE — 88311 DECALCIFY TISSUE: CPT | Performed by: THORACIC SURGERY (CARDIOTHORACIC VASCULAR SURGERY)

## 2024-10-10 PROCEDURE — C2628 CATHETER, OCCLUSION: HCPCS | Performed by: THORACIC SURGERY (CARDIOTHORACIC VASCULAR SURGERY)

## 2024-10-10 PROCEDURE — 86901 BLOOD TYPING SEROLOGIC RH(D): CPT

## 2024-10-10 PROCEDURE — 04CL0ZZ EXTIRPATION OF MATTER FROM LEFT FEMORAL ARTERY, OPEN APPROACH: ICD-10-PCS | Performed by: THORACIC SURGERY (CARDIOTHORACIC VASCULAR SURGERY)

## 2024-10-10 PROCEDURE — 04V03DZ RESTRICTION OF ABDOMINAL AORTA WITH INTRALUMINAL DEVICE, PERCUTANEOUS APPROACH: ICD-10-PCS | Performed by: THORACIC SURGERY (CARDIOTHORACIC VASCULAR SURGERY)

## 2024-10-10 DEVICE — PROGEL,  PLEURAL AIR LEAK SEALANT, 4 ML KIT
Type: IMPLANTABLE DEVICE | Site: GROIN | Status: FUNCTIONAL
Brand: PROGEL

## 2024-10-10 DEVICE — CLIP LIGAT VASC HORIZON TI LG ORNG 6CT: Type: IMPLANTABLE DEVICE | Site: ARTERY FEMORAL | Status: FUNCTIONAL

## 2024-10-10 DEVICE — EXCLUDER AAA ENDO TRNK IPSI 26MMX14.5MMX12CM 16FR
Type: IMPLANTABLE DEVICE | Site: AORTA | Status: FUNCTIONAL
Brand: GORE EXCLUDER AAA ENDOPROSTHESIS WITH C3 DELIVERY

## 2024-10-10 DEVICE — PROPATEN VASCULAR GRAFT TW RR 8MMX80CM 70CM RINGS HEPARIN
Type: IMPLANTABLE DEVICE | Site: AORTA | Status: FUNCTIONAL
Brand: GORE PROPATEN VASCULAR GRAFT

## 2024-10-10 DEVICE — EXCLUDER AAA ENDO CONTRA LEG 16MMX12MMX10CM 12FR
Type: IMPLANTABLE DEVICE | Site: AORTA | Status: FUNCTIONAL
Brand: GORE EXCLUDER AAA ENDOPROSTHESIS

## 2024-10-10 RX ORDER — EPHEDRINE SULFATE 50 MG/ML
INJECTION INTRAVENOUS AS NEEDED
Status: DISCONTINUED | OUTPATIENT
Start: 2024-10-10 | End: 2024-10-10 | Stop reason: SURG

## 2024-10-10 RX ORDER — ESCITALOPRAM OXALATE 10 MG/1
10 TABLET ORAL DAILY
Status: DISCONTINUED | OUTPATIENT
Start: 2024-10-11 | End: 2024-10-13 | Stop reason: HOSPADM

## 2024-10-10 RX ORDER — SODIUM CHLORIDE 450 MG/100ML
100 INJECTION, SOLUTION INTRAVENOUS CONTINUOUS
Status: ACTIVE | OUTPATIENT
Start: 2024-10-10 | End: 2024-10-11

## 2024-10-10 RX ORDER — AMLODIPINE BESYLATE 10 MG/1
10 TABLET ORAL DAILY
Status: DISCONTINUED | OUTPATIENT
Start: 2024-10-11 | End: 2024-10-13 | Stop reason: HOSPADM

## 2024-10-10 RX ORDER — HYDROCODONE BITARTRATE AND ACETAMINOPHEN 7.5; 325 MG/1; MG/1
1 TABLET ORAL EVERY 4 HOURS PRN
Status: DISCONTINUED | OUTPATIENT
Start: 2024-10-10 | End: 2024-10-13 | Stop reason: HOSPADM

## 2024-10-10 RX ORDER — ATORVASTATIN CALCIUM 40 MG/1
80 TABLET, FILM COATED ORAL DAILY
Status: DISCONTINUED | OUTPATIENT
Start: 2024-10-11 | End: 2024-10-13 | Stop reason: HOSPADM

## 2024-10-10 RX ORDER — ALBUTEROL SULFATE 90 UG/1
2 INHALANT RESPIRATORY (INHALATION) EVERY 4 HOURS PRN
Status: DISCONTINUED | OUTPATIENT
Start: 2024-10-10 | End: 2024-10-13 | Stop reason: HOSPADM

## 2024-10-10 RX ORDER — DROPERIDOL 2.5 MG/ML
0.62 INJECTION, SOLUTION INTRAMUSCULAR; INTRAVENOUS ONCE AS NEEDED
Status: DISCONTINUED | OUTPATIENT
Start: 2024-10-10 | End: 2024-10-10 | Stop reason: HOSPADM

## 2024-10-10 RX ORDER — MORPHINE SULFATE 2 MG/ML
2 INJECTION, SOLUTION INTRAMUSCULAR; INTRAVENOUS
Status: DISCONTINUED | OUTPATIENT
Start: 2024-10-10 | End: 2024-10-13 | Stop reason: HOSPADM

## 2024-10-10 RX ORDER — FENTANYL CITRATE 50 UG/ML
INJECTION, SOLUTION INTRAMUSCULAR; INTRAVENOUS
Status: COMPLETED
Start: 2024-10-10 | End: 2024-10-10

## 2024-10-10 RX ORDER — PROTAMINE SULFATE 10 MG/ML
INJECTION, SOLUTION INTRAVENOUS AS NEEDED
Status: DISCONTINUED | OUTPATIENT
Start: 2024-10-10 | End: 2024-10-10 | Stop reason: SURG

## 2024-10-10 RX ORDER — SODIUM CHLORIDE, SODIUM LACTATE, POTASSIUM CHLORIDE, CALCIUM CHLORIDE 600; 310; 30; 20 MG/100ML; MG/100ML; MG/100ML; MG/100ML
INJECTION, SOLUTION INTRAVENOUS CONTINUOUS PRN
Status: DISCONTINUED | OUTPATIENT
Start: 2024-10-10 | End: 2024-10-10 | Stop reason: SURG

## 2024-10-10 RX ORDER — ALLOPURINOL 300 MG/1
300 TABLET ORAL DAILY
Status: DISCONTINUED | OUTPATIENT
Start: 2024-10-11 | End: 2024-10-13 | Stop reason: HOSPADM

## 2024-10-10 RX ORDER — ONDANSETRON 4 MG/1
4 TABLET, ORALLY DISINTEGRATING ORAL EVERY 6 HOURS PRN
Status: DISCONTINUED | OUTPATIENT
Start: 2024-10-10 | End: 2024-10-13 | Stop reason: HOSPADM

## 2024-10-10 RX ORDER — BUPIVACAINE HYDROCHLORIDE AND EPINEPHRINE 5; 5 MG/ML; UG/ML
INJECTION, SOLUTION EPIDURAL; INTRACAUDAL; PERINEURAL AS NEEDED
Status: DISCONTINUED | OUTPATIENT
Start: 2024-10-10 | End: 2024-10-10 | Stop reason: HOSPADM

## 2024-10-10 RX ORDER — HYDROMORPHONE HYDROCHLORIDE 1 MG/ML
0.5 INJECTION, SOLUTION INTRAMUSCULAR; INTRAVENOUS; SUBCUTANEOUS
Status: DISCONTINUED | OUTPATIENT
Start: 2024-10-10 | End: 2024-10-10 | Stop reason: HOSPADM

## 2024-10-10 RX ORDER — CLOPIDOGREL BISULFATE 75 MG/1
75 TABLET ORAL DAILY
Status: DISCONTINUED | OUTPATIENT
Start: 2024-10-11 | End: 2024-10-13 | Stop reason: HOSPADM

## 2024-10-10 RX ORDER — FAMOTIDINE 10 MG/ML
20 INJECTION, SOLUTION INTRAVENOUS ONCE
Status: COMPLETED | OUTPATIENT
Start: 2024-10-10 | End: 2024-10-10

## 2024-10-10 RX ORDER — ONDANSETRON 2 MG/ML
INJECTION INTRAMUSCULAR; INTRAVENOUS AS NEEDED
Status: DISCONTINUED | OUTPATIENT
Start: 2024-10-10 | End: 2024-10-10 | Stop reason: SURG

## 2024-10-10 RX ORDER — LABETALOL HYDROCHLORIDE 5 MG/ML
INJECTION, SOLUTION INTRAVENOUS AS NEEDED
Status: DISCONTINUED | OUTPATIENT
Start: 2024-10-10 | End: 2024-10-10 | Stop reason: SURG

## 2024-10-10 RX ORDER — NEBIVOLOL 5 MG/1
5 TABLET ORAL DAILY
Status: DISCONTINUED | OUTPATIENT
Start: 2024-10-11 | End: 2024-10-13 | Stop reason: HOSPADM

## 2024-10-10 RX ORDER — ONDANSETRON 2 MG/ML
4 INJECTION INTRAMUSCULAR; INTRAVENOUS EVERY 6 HOURS PRN
Status: DISCONTINUED | OUTPATIENT
Start: 2024-10-10 | End: 2024-10-13 | Stop reason: HOSPADM

## 2024-10-10 RX ORDER — NITROGLYCERIN 0.4 MG/1
0.4 TABLET SUBLINGUAL
Status: DISCONTINUED | OUTPATIENT
Start: 2024-10-10 | End: 2024-10-13 | Stop reason: HOSPADM

## 2024-10-10 RX ORDER — HEPARIN SODIUM 1000 [USP'U]/ML
INJECTION, SOLUTION INTRAVENOUS; SUBCUTANEOUS AS NEEDED
Status: DISCONTINUED | OUTPATIENT
Start: 2024-10-10 | End: 2024-10-10 | Stop reason: SURG

## 2024-10-10 RX ORDER — FENTANYL CITRATE 50 UG/ML
50 INJECTION, SOLUTION INTRAMUSCULAR; INTRAVENOUS
Status: DISCONTINUED | OUTPATIENT
Start: 2024-10-10 | End: 2024-10-10 | Stop reason: HOSPADM

## 2024-10-10 RX ORDER — ASPIRIN 81 MG/1
81 TABLET ORAL DAILY
Status: DISCONTINUED | OUTPATIENT
Start: 2024-10-11 | End: 2024-10-13 | Stop reason: HOSPADM

## 2024-10-10 RX ORDER — FENTANYL CITRATE 50 UG/ML
INJECTION, SOLUTION INTRAMUSCULAR; INTRAVENOUS AS NEEDED
Status: DISCONTINUED | OUTPATIENT
Start: 2024-10-10 | End: 2024-10-10 | Stop reason: SDUPTHER

## 2024-10-10 RX ORDER — LIDOCAINE HYDROCHLORIDE 10 MG/ML
INJECTION, SOLUTION EPIDURAL; INFILTRATION; INTRACAUDAL; PERINEURAL AS NEEDED
Status: DISCONTINUED | OUTPATIENT
Start: 2024-10-10 | End: 2024-10-10 | Stop reason: SURG

## 2024-10-10 RX ORDER — ROCURONIUM BROMIDE 10 MG/ML
INJECTION, SOLUTION INTRAVENOUS AS NEEDED
Status: DISCONTINUED | OUTPATIENT
Start: 2024-10-10 | End: 2024-10-10 | Stop reason: SURG

## 2024-10-10 RX ORDER — PROPOFOL 10 MG/ML
VIAL (ML) INTRAVENOUS AS NEEDED
Status: DISCONTINUED | OUTPATIENT
Start: 2024-10-10 | End: 2024-10-10 | Stop reason: SURG

## 2024-10-10 RX ORDER — IOPAMIDOL 612 MG/ML
INJECTION, SOLUTION INTRAVASCULAR AS NEEDED
Status: DISCONTINUED | OUTPATIENT
Start: 2024-10-10 | End: 2024-10-10 | Stop reason: HOSPADM

## 2024-10-10 RX ADMIN — FENTANYL CITRATE 100 MCG: 50 INJECTION, SOLUTION INTRAMUSCULAR; INTRAVENOUS at 12:41

## 2024-10-10 RX ADMIN — MORPHINE SULFATE 2 MG: 2 INJECTION, SOLUTION INTRAMUSCULAR; INTRAVENOUS at 20:24

## 2024-10-10 RX ADMIN — MUPIROCIN 1 APPLICATION: 20 OINTMENT TOPICAL at 12:21

## 2024-10-10 RX ADMIN — EPHEDRINE SULFATE 5 MG: 50 INJECTION INTRAVENOUS at 13:51

## 2024-10-10 RX ADMIN — SODIUM CHLORIDE 100 ML/HR: 4.5 INJECTION, SOLUTION INTRAVENOUS at 15:51

## 2024-10-10 RX ADMIN — MORPHINE SULFATE 2 MG: 2 INJECTION, SOLUTION INTRAMUSCULAR; INTRAVENOUS at 17:06

## 2024-10-10 RX ADMIN — VANCOMYCIN HYDROCHLORIDE 1000 MG: 1 INJECTION, POWDER, LYOPHILIZED, FOR SOLUTION INTRAVENOUS at 12:01

## 2024-10-10 RX ADMIN — LABETALOL HYDROCHLORIDE 10 MG: 5 INJECTION, SOLUTION INTRAVENOUS at 13:58

## 2024-10-10 RX ADMIN — ROCURONIUM BROMIDE 50 MG: 10 INJECTION INTRAVENOUS at 12:41

## 2024-10-10 RX ADMIN — FAMOTIDINE 20 MG: 10 INJECTION, SOLUTION INTRAVENOUS at 12:21

## 2024-10-10 RX ADMIN — PROTAMINE SULFATE 50 MG: 10 INJECTION, SOLUTION INTRAVENOUS at 13:53

## 2024-10-10 RX ADMIN — LIDOCAINE HYDROCHLORIDE 50 MG: 10 INJECTION, SOLUTION EPIDURAL; INFILTRATION; INTRACAUDAL; PERINEURAL at 12:41

## 2024-10-10 RX ADMIN — SODIUM CHLORIDE, POTASSIUM CHLORIDE, SODIUM LACTATE AND CALCIUM CHLORIDE: 600; 310; 30; 20 INJECTION, SOLUTION INTRAVENOUS at 12:37

## 2024-10-10 RX ADMIN — ONDANSETRON 4 MG: 2 INJECTION INTRAMUSCULAR; INTRAVENOUS at 13:58

## 2024-10-10 RX ADMIN — HEPARIN SODIUM 2000 UNITS: 1000 INJECTION INTRAVENOUS; SUBCUTANEOUS at 13:19

## 2024-10-10 RX ADMIN — FENTANYL CITRATE 50 MCG: 50 INJECTION, SOLUTION INTRAMUSCULAR; INTRAVENOUS at 15:23

## 2024-10-10 RX ADMIN — HYDROCODONE BITARTRATE AND ACETAMINOPHEN 1 TABLET: 7.5; 325 TABLET ORAL at 17:06

## 2024-10-10 RX ADMIN — ROCURONIUM BROMIDE 10 MG: 10 INJECTION INTRAVENOUS at 13:19

## 2024-10-10 RX ADMIN — HEPARIN SODIUM 5000 UNITS: 1000 INJECTION INTRAVENOUS; SUBCUTANEOUS at 12:59

## 2024-10-10 RX ADMIN — SUGAMMADEX 200 MG: 100 INJECTION, SOLUTION INTRAVENOUS at 14:19

## 2024-10-10 RX ADMIN — PROPOFOL 100 MG: 10 INJECTION, EMULSION INTRAVENOUS at 12:41

## 2024-10-10 RX ADMIN — ROCURONIUM BROMIDE 5 MG: 10 INJECTION INTRAVENOUS at 12:51

## 2024-10-10 NOTE — PROGRESS NOTES
"INTENSIVIST NOTE     Hospital Day: 0    Ms. Orin Munoz, 69 y.o. female is followed for:   Perioperative management of comorbid medical conditions including glycemic and respiratory management       SUBJECTIVE     Interval history:    Seen in ICU postoperatively.  Afebrile.  Awake alert and oriented.  Normal sinus rhythm.  No drips.  Pain control acceptable.    The patient's relevant past medical, surgical and social history were reviewed and updated in Epic as appropriate.       OBJECTIVE     Vital Sign Min/Max for last 24 hours  Temp  Min: 98 °F (36.7 °C)  Max: 98.5 °F (36.9 °C)   BP  Min: 103/53  Max: 131/59   Pulse  Min: 60  Max: 88   Resp  Min: 16  Max: 18   SpO2  Min: 90 %  Max: 98 %   No data recorded   Weight  Min: 65.8 kg (145 lb)  Max: 65.8 kg (145 lb)      Intake/Output Summary (Last 24 hours) at 10/10/2024 1820  Last data filed at 10/10/2024 1354  Gross per 24 hour   Intake --   Output 200 ml   Net -200 ml      Flowsheet Rows      Flowsheet Row First Filed Value   Admission Height 162.6 cm (64\") Documented at 10/10/2024 1215   Admission Weight 65.8 kg (145 lb) Documented at 10/10/2024 1215               10/10/24  1215   Weight: 65.8 kg (145 lb)            Objective:  General Appearance:  In no acute distress.    Vital signs: (most recent): Blood pressure 111/54, pulse 63, temperature 98 °F (36.7 °C), temperature source Oral, resp. rate 16, height 162.6 cm (64\"), weight 65.8 kg (145 lb), SpO2 94%.    HEENT: Normal HEENT exam.    Lungs:  Normal effort and normal respiratory rate.  Breath sounds clear to auscultation.  She is not in respiratory distress.  No rales, wheezes or rhonchi.    Heart: Normal rate.  Regular rhythm.  S1 normal and S2 normal.  No murmur, gallop or friction rub.   Chest: Symmetric chest wall expansion.   Abdomen: Abdomen is soft and non-distended.  Bowel sounds are normal.   There is no abdominal tenderness.   There is no mass. There is no splenomegaly. There is no hepatomegaly. "   Extremities: There is no deformity or dependent edema.  (Right radial arterial line  Bilateral groin sites without significant bleeding or hematoma)  Neurological: Patient is alert and oriented to person, place and time.    Pupils:  Pupils are equal, round, and reactive to light.    Skin:  Warm and dry.                I reviewed the patient's new clinical results.  I reviewed the patient's new imaging results/reports including actual images and agree with reports.    XR Chest PA & Lateral    Result Date: 10/10/2024  Impression: Emphysematous changes. No acute process. Electronically Signed: Comfort Encinas MD  10/10/2024 2:20 PM EDT  Workstation ID: [id number]      INFUSIONS  niCARdipine, 5-15 mg/hr, Last Rate: Stopped (10/10/24 1628)  sodium chloride, 100 mL/hr, Last Rate: 100 mL/hr (10/10/24 1551)        Results from last 7 days   Lab Units 10/09/24  1229   WBC 10*3/mm3 8.30   HEMOGLOBIN g/dL 12.5   HEMATOCRIT % 38.9   PLATELETS 10*3/mm3 274     Results from last 7 days   Lab Units 10/09/24  1229   SODIUM mmol/L 139   POTASSIUM mmol/L 4.2   CHLORIDE mmol/L 104   CO2 mmol/L 25.0   BUN mg/dL 16   GLUCOSE mg/dL 110*   CREATININE mg/dL 1.27*   CALCIUM mg/dL 8.8               Patient isn't on Tube Feeding   /h  Patient doesn't have any tube feeding modular orders    Mechanical Ventilator:   Settings: Observed:                                                I reviewed the patient's medications.    Assessment & Plan   ASSESSMENT/PLAN     Active Hospital Problems    Diagnosis     **Thoracoabdominal aortic aneurysm (TAAA) without rupture     AAA (abdominal aortic aneurysm)     Stage 3b chronic kidney disease     COPD (chronic obstructive pulmonary disease)     Primary hypertension     Hyperlipidemia     PVD (peripheral vascular disease) with claudication     Asthma        69-year-old female with a past medical history significant for hypertension, dyslipidemia, PAD, asthma/COPD, stage IIIb chronic kidney disease,  osteoporosis, gout, and mood disorder.  She is a prior smoker.  She was referred to Dr. Villarreal last month.  He noted left iliac artery occlusion and an infrarenal AAA.  She had suffered from pain and atrophy in her left calf.    Today she underwent TAVR with left femoral endarterectomy, right iliac stent, and left renal stent.    Seen in ICU postprocedure.  She is complaining of moderate left groin pain but there is any significant bleeding or hematoma formation.  Blood pressure is 111/54 and rhythm is sinus.  She is on nasal cannula O2.    Plan:    ICU admission at least until tomorrow  Maintain blood pressure within strict parameters.  Cardene drip as needed  DAPT  Home medications resumed as appropriate  Morphine/Norco as needed for pain control  Will assist with postoperative critical care and medical management     I discussed the patient's findings and my recommendations with patient and nursing staff     Plan of care and goals reviewed with multidisciplinary team at daily rounds.    .    Nils Sanchez MD  Pulmonary and Critical Care Medicine  10/10/24 18:20 EDT

## 2024-10-10 NOTE — ANESTHESIA PROCEDURE NOTES
Airway  Urgency: elective    Date/Time: 10/10/2024 12:44 PM  Airway not difficult    General Information and Staff    Patient location during procedure: OR  CRNA/CAA: Tommy Whalen CRNA    Indications and Patient Condition  Indications for airway management: airway protection    Preoxygenated: yes  MILS not maintained throughout  Mask difficulty assessment: 1 - vent by mask    Final Airway Details  Final airway type: endotracheal airway      Successful airway: ETT  Cuffed: yes   Successful intubation technique: direct laryngoscopy  Facilitating devices/methods: intubating stylet  Endotracheal tube insertion site: oral  Blade: Fred  Blade size: 3  ETT size (mm): 7.0  Cormack-Lehane Classification: grade IIa - partial view of glottis  Placement verified by: chest auscultation and capnometry   Measured from: lips  ETT/EBT  to lips (cm): 20  Number of attempts at approach: 1  Assessment: lips, teeth, and gum same as pre-op and atraumatic intubation    Additional Comments  Negative epigastric sounds, Breath sound equal bilaterally with symmetric chest rise and fall

## 2024-10-10 NOTE — ANESTHESIA PREPROCEDURE EVALUATION
Anesthesia Evaluation     Patient summary reviewed and Nursing notes reviewed   no history of anesthetic complications:   NPO Solid Status: > 8 hours  NPO Liquid Status: > 8 hours           Airway   Mallampati: II  TM distance: >3 FB  Neck ROM: full  No difficulty expected  Dental      Pulmonary - normal exam   (+) pneumonia , COPD, asthma,  Cardiovascular - normal exam    (+) hypertension, valvular problems/murmurs, CAD, PVD, hyperlipidemia      Neuro/Psych  (+) numbness, psychiatric history  GI/Hepatic/Renal/Endo    (+) renal disease-    Musculoskeletal     Abdominal    Substance History      OB/GYN          Other   arthritis,                 Anesthesia Plan    ASA 3     general     intravenous induction     Anesthetic plan, risks, benefits, and alternatives have been provided, discussed and informed consent has been obtained with: patient.    Plan discussed with CRNA.    CODE STATUS:

## 2024-10-10 NOTE — ANESTHESIA POSTPROCEDURE EVALUATION
"Patient: Orin Munoz    Procedure Summary       Date: 10/10/24 Room / Location:  [facility] /  [facility]    Anesthesia Start: 1237 Anesthesia Stop: 1431    Procedures:       ABDOMINAL AORTIC ANEURYSM REPAIR WITH ENDOGRAFT, BILATERAL FEMORAL CUTDOWNS, AORTAGRAM, ENDOVASCULAR REPAIR, INFRARENAL ABDOMINAL AORTIC ANEURYSM, RIGHT ILLIAC STENT, RIGHT AND LEFT FEMORAL ENDARTECTOMY (Abdomen)      RENAL ARTERY STENT (Left)      FEMORAL FEMORAL BYPASS WITH PTFE GRAFT (Groin) Diagnosis:       Thoracoabdominal aortic aneurysm (TAAA) without rupture, unspecified part      (Thoracoabdominal aortic aneurysm (TAAA) without rupture, unspecified part [I71.60])    Surgeons: Aaron Villarreal MD Provider: Javier Lnagley MD    Anesthesia Type: general ASA Status: 3            Anesthesia Type: general    Vitals  No vitals data found for the desired time range.          Post Anesthesia Care and Evaluation    Patient location during evaluation: PACU  Patient participation: complete - patient participated  Level of consciousness: awake and responsive to verbal stimuli  Pain score: 2  Pain management: adequate    Airway patency: patent  Anesthetic complications: No anesthetic complications    Cardiovascular status: acceptable  Respiratory status: acceptable  Hydration status: acceptable    Comments: Pt awake and responsive. SV. VSS. Report to RN. Patient Vitals in the past 24 hrs:  10/10/24 1215, BP:131/59, Temp:98.5 °F (36.9 °C), Temp src:Temporal, Pulse:88, Resp:18, SpO2:97 %, Height:162.6 cm (64\"), Weight:65.8 kg (145 lb)  133/78. p 72. r 16. t 98.1                "

## 2024-10-11 LAB
ANION GAP SERPL CALCULATED.3IONS-SCNC: 10 MMOL/L (ref 5–15)
BASOPHILS # BLD AUTO: 0.02 10*3/MM3 (ref 0–0.2)
BASOPHILS NFR BLD AUTO: 0.3 % (ref 0–1.5)
BUN SERPL-MCNC: 17 MG/DL (ref 8–23)
BUN/CREAT SERPL: 15.2 (ref 7–25)
CALCIUM SPEC-SCNC: 7.7 MG/DL (ref 8.6–10.5)
CHLORIDE SERPL-SCNC: 107 MMOL/L (ref 98–107)
CO2 SERPL-SCNC: 21 MMOL/L (ref 22–29)
CREAT SERPL-MCNC: 1.12 MG/DL (ref 0.57–1)
D-LACTATE SERPL-SCNC: 0.6 MMOL/L (ref 0.5–2)
DEPRECATED RDW RBC AUTO: 50.1 FL (ref 37–54)
EGFRCR SERPLBLD CKD-EPI 2021: 52.7 ML/MIN/1.73
EOSINOPHIL # BLD AUTO: 0.13 10*3/MM3 (ref 0–0.4)
EOSINOPHIL NFR BLD AUTO: 1.9 % (ref 0.3–6.2)
ERYTHROCYTE [DISTWIDTH] IN BLOOD BY AUTOMATED COUNT: 14.4 % (ref 12.3–15.4)
GLUCOSE SERPL-MCNC: 113 MG/DL (ref 65–99)
HCT VFR BLD AUTO: 30.2 % (ref 34–46.6)
HGB BLD-MCNC: 9.7 G/DL (ref 12–15.9)
IMM GRANULOCYTES # BLD AUTO: 0.02 10*3/MM3 (ref 0–0.05)
IMM GRANULOCYTES NFR BLD AUTO: 0.3 % (ref 0–0.5)
LYMPHOCYTES # BLD AUTO: 0.93 10*3/MM3 (ref 0.7–3.1)
LYMPHOCYTES NFR BLD AUTO: 13.3 % (ref 19.6–45.3)
MCH RBC QN AUTO: 30.5 PG (ref 26.6–33)
MCHC RBC AUTO-ENTMCNC: 32.1 G/DL (ref 31.5–35.7)
MCV RBC AUTO: 95 FL (ref 79–97)
MONOCYTES # BLD AUTO: 0.6 10*3/MM3 (ref 0.1–0.9)
MONOCYTES NFR BLD AUTO: 8.6 % (ref 5–12)
NEUTROPHILS NFR BLD AUTO: 5.29 10*3/MM3 (ref 1.7–7)
NEUTROPHILS NFR BLD AUTO: 75.6 % (ref 42.7–76)
NRBC BLD AUTO-RTO: 0 /100 WBC (ref 0–0.2)
PLATELET # BLD AUTO: 178 10*3/MM3 (ref 140–450)
PMV BLD AUTO: 10.4 FL (ref 6–12)
POTASSIUM SERPL-SCNC: 4.1 MMOL/L (ref 3.5–5.2)
RBC # BLD AUTO: 3.18 10*6/MM3 (ref 3.77–5.28)
SODIUM SERPL-SCNC: 138 MMOL/L (ref 136–145)
WBC NRBC COR # BLD AUTO: 6.99 10*3/MM3 (ref 3.4–10.8)

## 2024-10-11 PROCEDURE — 34703 EVASC RPR A-UNILAC NDGFT: CPT | Performed by: THORACIC SURGERY (CARDIOTHORACIC VASCULAR SURGERY)

## 2024-10-11 PROCEDURE — 34813 FEMORAL ENDOVAS GRAFT ADD-ON: CPT | Performed by: THORACIC SURGERY (CARDIOTHORACIC VASCULAR SURGERY)

## 2024-10-11 PROCEDURE — 34812 OPN FEM ART EXPOS: CPT | Performed by: THORACIC SURGERY (CARDIOTHORACIC VASCULAR SURGERY)

## 2024-10-11 PROCEDURE — 99024 POSTOP FOLLOW-UP VISIT: CPT | Performed by: THORACIC SURGERY (CARDIOTHORACIC VASCULAR SURGERY)

## 2024-10-11 PROCEDURE — 85025 COMPLETE CBC W/AUTO DIFF WBC: CPT | Performed by: PHYSICIAN ASSISTANT

## 2024-10-11 PROCEDURE — 83605 ASSAY OF LACTIC ACID: CPT | Performed by: PHYSICIAN ASSISTANT

## 2024-10-11 PROCEDURE — 25010000002 VANCOMYCIN 1 G RECONSTITUTED SOLUTION 1 EACH VIAL: Performed by: PHYSICIAN ASSISTANT

## 2024-10-11 PROCEDURE — 80048 BASIC METABOLIC PNL TOTAL CA: CPT | Performed by: PHYSICIAN ASSISTANT

## 2024-10-11 PROCEDURE — 99232 SBSQ HOSP IP/OBS MODERATE 35: CPT | Performed by: INTERNAL MEDICINE

## 2024-10-11 PROCEDURE — 25810000003 SODIUM CHLORIDE 0.9 % SOLUTION 250 ML FLEX CONT: Performed by: PHYSICIAN ASSISTANT

## 2024-10-11 RX ORDER — DOXYCYCLINE 100 MG/1
100 CAPSULE ORAL EVERY 12 HOURS SCHEDULED
Status: DISCONTINUED | OUTPATIENT
Start: 2024-10-12 | End: 2024-10-13 | Stop reason: HOSPADM

## 2024-10-11 RX ADMIN — CLOPIDOGREL BISULFATE 75 MG: 75 TABLET ORAL at 09:43

## 2024-10-11 RX ADMIN — HYDROCODONE BITARTRATE AND ACETAMINOPHEN 1 TABLET: 7.5; 325 TABLET ORAL at 17:01

## 2024-10-11 RX ADMIN — ESCITALOPRAM OXALATE 10 MG: 10 TABLET ORAL at 09:43

## 2024-10-11 RX ADMIN — HYDROCODONE BITARTRATE AND ACETAMINOPHEN 1 TABLET: 7.5; 325 TABLET ORAL at 06:35

## 2024-10-11 RX ADMIN — ASPIRIN 81 MG: 81 TABLET, COATED ORAL at 09:42

## 2024-10-11 RX ADMIN — VANCOMYCIN HYDROCHLORIDE 1000 MG: 1 INJECTION, POWDER, LYOPHILIZED, FOR SOLUTION INTRAVENOUS at 12:17

## 2024-10-11 RX ADMIN — HYDROCODONE BITARTRATE AND ACETAMINOPHEN 1 TABLET: 7.5; 325 TABLET ORAL at 00:31

## 2024-10-11 RX ADMIN — ALLOPURINOL 300 MG: 300 TABLET ORAL at 09:43

## 2024-10-11 RX ADMIN — ATORVASTATIN CALCIUM 80 MG: 40 TABLET, FILM COATED ORAL at 09:42

## 2024-10-11 RX ADMIN — SODIUM CHLORIDE 100 ML/HR: 4.5 INJECTION, SOLUTION INTRAVENOUS at 01:01

## 2024-10-11 NOTE — PROGRESS NOTES
"INTENSIVIST NOTE     Hospital Day: 1    Ms. Orin Munoz, 69 y.o. female is followed for:   Perioperative management of comorbid medical conditions including glycemic and respiratory management       SUBJECTIVE     Interval history:    Awake alert and oriented.  Normal sinus rhythm.  Afebrile.  Some soreness in her groin but otherwise no new complaints.    The patient's relevant past medical, surgical and social history were reviewed and updated in Epic as appropriate.       OBJECTIVE     Vital Sign Min/Max for last 24 hours  Temp  Min: 97.4 °F (36.3 °C)  Max: 98.5 °F (36.9 °C)   BP  Min: 91/40  Max: 131/59   Pulse  Min: 56  Max: 88   Resp  Min: 16  Max: 18   SpO2  Min: 90 %  Max: 98 %   No data recorded   Weight  Min: 65.8 kg (145 lb)  Max: 65.8 kg (145 lb)      Intake/Output Summary (Last 24 hours) at 10/11/2024 1029  Last data filed at 10/11/2024 0400  Gross per 24 hour   Intake 1150 ml   Output 700 ml   Net 450 ml      Flowsheet Rows      Flowsheet Row First Filed Value   Admission Height 162.6 cm (64\") Documented at 10/10/2024 1215   Admission Weight 65.8 kg (145 lb) Documented at 10/10/2024 1215               10/10/24  1215   Weight: 65.8 kg (145 lb)            Objective:  General Appearance:  In no acute distress.    Vital signs: (most recent): Blood pressure 91/40, pulse 58, temperature 97.4 °F (36.3 °C), temperature source Oral, resp. rate 18, height 162.6 cm (64\"), weight 65.8 kg (145 lb), SpO2 95%.    HEENT: Normal HEENT exam.    Lungs:  Normal effort and normal respiratory rate.  Breath sounds clear to auscultation.  She is not in respiratory distress.  No rales, wheezes or rhonchi.    Heart: Normal rate.  Regular rhythm.  S1 normal and S2 normal.  No murmur, gallop or friction rub.   Chest: Symmetric chest wall expansion.   Abdomen: Abdomen is soft and non-distended.  Bowel sounds are normal.   There is no abdominal tenderness.   There is no mass. There is no splenomegaly. There is no hepatomegaly. "   Extremities: There is no deformity or dependent edema.  (Right radial arterial line  Bilateral groin sites without significant bleeding or hematoma)  Neurological: Patient is alert and oriented to person, place and time.    Pupils:  Pupils are equal, round, and reactive to light.    Skin:  Warm and dry.                I reviewed the patient's new clinical results.  I reviewed the patient's new imaging results/reports including actual images and agree with reports.    XR Chest PA & Lateral    Result Date: 10/10/2024  Impression: Emphysematous changes. No acute process. Electronically Signed: Comfort Encinas MD  10/10/2024 2:20 PM EDT  Workstation ID: [id number]      INFUSIONS  niCARdipine, 5-15 mg/hr, Last Rate: Stopped (10/10/24 1628)        Results from last 7 days   Lab Units 10/11/24  0320 10/09/24  1229   WBC 10*3/mm3 6.99 8.30   HEMOGLOBIN g/dL 9.7* 12.5   HEMATOCRIT % 30.2* 38.9   PLATELETS 10*3/mm3 178 274     Results from last 7 days   Lab Units 10/11/24  0320 10/09/24  1229   SODIUM mmol/L 138 139   POTASSIUM mmol/L 4.1 4.2   CHLORIDE mmol/L 107 104   CO2 mmol/L 21.0* 25.0   BUN mg/dL 17 16   GLUCOSE mg/dL 113* 110*   CREATININE mg/dL 1.12* 1.27*   CALCIUM mg/dL 7.7* 8.8               Patient isn't on Tube Feeding   /h  Patient doesn't have any tube feeding modular orders    Mechanical Ventilator:   Settings: Observed:                                                I reviewed the patient's medications.    Assessment & Plan   ASSESSMENT/PLAN     Active Hospital Problems    Diagnosis     **Thoracoabdominal aortic aneurysm (TAAA) without rupture     Stage 3b chronic kidney disease     COPD (chronic obstructive pulmonary disease)     Primary hypertension     Hyperlipidemia     PVD (peripheral vascular disease) with claudication     Asthma        69-year-old female with a past medical history significant for hypertension, dyslipidemia, PAD, asthma/COPD, stage IIIb chronic kidney disease, osteoporosis, gout, and  mood disorder.  She is a prior smoker.  She was referred to Dr. Villarreal last month.  He noted left iliac artery occlusion and an infrarenal AAA.  She had suffered from pain and atrophy in her left calf.    On 10/10, she underwent TAVR with left femoral endarterectomy, right iliac stent, and left renal stent.    Doing well on her first postoperative day.  Pain control adequate.  No fever.  Normal sinus rhythm.  Blood pressure control acceptable.  Plans are to keep in hospital and continue antibiotics.    Plan:    Antibiotics per Dr. Villarreal  Maintain blood pressure within strict parameters.   DAPT  Home medications resumed as appropriate  Morphine/Norco as needed for pain control  Will continue to assist with postoperative critical care and medical management     I discussed the patient's findings and my recommendations with patient and nursing staff     Plan of care and goals reviewed with multidisciplinary team at daily rounds.    .    Nils Sanchez MD  Pulmonary and Critical Care Medicine  10/11/24 10:29 EDT

## 2024-10-11 NOTE — CASE MANAGEMENT/SOCIAL WORK
Discharge Planning Assessment  [facility]     Patient Name: Orin Munoz  MRN: [id number]  Today's Date: 10/11/2024    Admit Date: 10/10/2024    Plan: IDP   Discharge Needs Assessment       Row Name 10/11/24 1444       Living Environment    People in Home alone    Current Living Arrangements apartment    Duration at Residence bottom floor    Potentially Unsafe Housing Conditions none    In the past 12 months has the electric, gas, oil, or water company threatened to shut off services in your home? No    Primary Care Provided by self    Provides Primary Care For no one    Family Caregiver if Needed friend(s)    Quality of Family Relationships supportive;involved;helpful    Able to Return to Prior Arrangements yes       Resource/Environmental Concerns    Resource/Environmental Concerns none    Transportation Concerns no car       Transportation Needs    In the past 12 months, has lack of transportation kept you from medical appointments or from getting medications? no    In the past 12 months, has lack of transportation kept you from meetings, work, or from getting things needed for daily living? No       Food Insecurity    Within the past 12 months, you worried that your food would run out before you got the money to buy more. Never true    Within the past 12 months, the food you bought just didn't last and you didn't have money to get more. Never true       Transition Planning    Patient/Family Anticipates Transition to home with family    Patient/Family Anticipated Services at Transition none    Transportation Anticipated family or friend will provide       Discharge Needs Assessment    Readmission Within the Last 30 Days no previous admission in last 30 days    Equipment Currently Used at Home cane, quad;cane, straight;commode;wheelchair;rollator;shower chair    Concerns to be Addressed other (see comments)    Anticipated Changes Related to Illness none    Equipment Needed After Discharge none                    Discharge Plan       Row Name 10/11/24 1451       Plan    Plan IDP    Patient/Family in Agreement with Plan yes    Plan Comments Spoke with patient and sister at bedside. Pt lives on first floor in [facility] in [facility] alone. IADL. Has DME equipment. No current HH/OPPT. PCP Yomaira Saldana. [place] Adv and Medicaid with script filled at [facility]. Pt expressed food insecurity with MSW notified for resources. Plan is home with family to transport. CM will cont to follow.    Final Discharge Disposition Code 01 - home or self-care                  Continued Care and Services - Admitted Since 10/10/2024    No active coordination exists for this encounter.          Demographic Summary       Row Name 10/11/24 1444       General Information    Admission Type inpatient    Arrived From home    Referral Source admission list    Reason for Consult discharge planning    Preferred Language English                   Functional Status       Row Name 10/11/24 1444       Functional Status    Usual Activity Tolerance moderate    Current Activity Tolerance moderate       Physical Activity    On average, how many days per week do you engage in moderate to strenuous exercise (like a brisk walk)? 0 days    On average, how many minutes do you engage in exercise at this level? 0 min    Number of minutes of exercise per week 0       Functional Status, IADL    Medications independent    Meal Preparation independent    Housekeeping independent    Laundry independent    Shopping independent                   Psychosocial    No documentation.                  Abuse/Neglect    No documentation.                  Legal    No documentation.                  Substance Abuse    No documentation.                  Patient Forms    No documentation.                     Lela Brannon RN

## 2024-10-11 NOTE — CASE MANAGEMENT/SOCIAL WORK
Continued Stay Note  [facility]     Patient Name: Orin Munoz  MRN: [id number]  Today's Date: 10/11/2024    Admit Date: 10/10/2024    Plan: Food resources   Discharge Plan       Row Name 10/11/24 7523       Plan    Plan Food resources    Plan Comments MSW contacted by CM regarding food resources. MSW met with Pt at bedside and provided local food resources and encouraged Pt reach out to insurance CM. MSW available.      Row Name 10/11/24 6093                                              Discharge Codes    No documentation.                       KIRT Mendez

## 2024-10-11 NOTE — PROGRESS NOTES
CTS Progress Note       LOS: 1 day   Patient Care Team:  Yomaira Saldana PA as PCP - General (Family Medicine)  Tristan Springer MD as Consulting Physician (Internal Medicine)  Cuco Alicia MD as Consulting Physician (Cardiology)  Walt Graham DPM as Consulting Physician (Podiatry)  Derrick Black MD as Consulting Physician (Ophthalmology)  Mango Jane MD as Consulting Physician (Orthopedic Surgery)  Tyra Melissa MD as Consulting Physician (Nephrology)  Montez Barnes MD (Inactive) as Consulting Physician (Orthopedic Surgery)  Aaron Villarreal MD as Surgeon (Cardiothoracic Surgery)    Chief Complaint: Thoracoabdominal aortic aneurysm (TAAA) without rupture    Vital Signs:  Temp:  [97.4 °F (36.3 °C)-98.5 °F (36.9 °C)] 97.4 °F (36.3 °C)  Heart Rate:  [56-88] 58  Resp:  [16-18] 16  BP: ()/(40-59) 91/40  Arterial Line BP: ()/(31-52) 101/39    Physical Exam: Alert conversant breathing unlabored on room air groin sites are satisfactory feet warm and viable       Results:     Results from last 7 days   Lab Units 10/11/24  0320   WBC 10*3/mm3 6.99   HEMOGLOBIN g/dL 9.7*   HEMATOCRIT % 30.2*   PLATELETS 10*3/mm3 178     Results from last 7 days   Lab Units 10/11/24  0320   SODIUM mmol/L 138   POTASSIUM mmol/L 4.1   CHLORIDE mmol/L 107   CO2 mmol/L 21.0*   BUN mg/dL 17   CREATININE mg/dL 1.12*   GLUCOSE mg/dL 113*   CALCIUM mg/dL 7.7*           Imaging Results (Last 24 Hours)       Procedure Component Value Units Date/Time    XR [place] OR Procedure [[id number]] Resulted: 10/10/24 1424     Updated: 10/10/24 1424            Assessment      Thoracoabdominal aortic aneurysm (TAAA) without rupture    Asthma    Primary hypertension    Hyperlipidemia    PVD (peripheral vascular disease) with claudication    COPD (chronic obstructive pulmonary disease)    Stage 3b chronic kidney disease        Plan   May ambulate with assistance  Do not sit at 90 degrees  Plavix 75 mg p.o. now and  daily thereafter  Continue intravenous antibiotics another 24 hours secondary to open abrasion on left knee and extensive prosthetic graft material    Please note that portions of this note were completed with a voice recognition program. Efforts were made to edit the dictations, but occasionally words are mistranscribed.    Aaron Villarreal MD  10/11/24  06:26 EDT

## 2024-10-11 NOTE — PLAN OF CARE
Goal Outcome Evaluation:  Plan of Care Reviewed With: patient        Progress: improving  Outcome Evaluation: Pt improving, pt ambulated in hallway twice with no issue.  Pedal pulses are present and dopplerable.  Adequate urine output during shift.  Arterial line removed with no issue.  Pt instructed to use IS and encouraged to continue.  Patient given pain meds to assist with pain in groin area.  Patient has decreased appetitie, encouraged to increase oral intake.  At times during shift, patient had confused conversation, reoriented easily.  Patient on 1LNC.  VSS, will continue to monitor.

## 2024-10-11 NOTE — OP NOTE
Operative Report    Preop Diagnosis: Infrarenal aortic aneurysm.  Severe peripheral arterial vascular disease.  Occluded left common and external iliac artery.  Possible left renal artery stenosis.  Former smoker        Postoperative Diagnosis: Same      Procedure: Bilateral femoral artery cutdowns.  Catheter in aorta.  Aortogram.  Endovascular repair of an infrarenal abdominal aortic aneurysm with a [place] excluder device main body right size 26 x 14 x 12.  A second main body right size 26 x 14 x 12 in an aorto uniiliac configuration.  Extension limb on the right size 12 x 10       Right femoral artery to left femoral artery bypass graft with 8 mm PTFE ringed graft     Endarterectomy of the right and left common femoral artery        Surgeons: Aaron Villarreal MD      Assistant: Jonathon Covarrubias MD    The Assistant provided services of suctioning, irrigation and retraction.  Also, assisted in suture closure of parts of the skin incision.      Indication: This patient had been referred to my office with CT scan demonstrating an enlarging infrarenal aortic aneurysm and significant left leg pain with an occluded left common and external iliac artery by CT angiogram and also what appeared to be significant left renal artery stenosis for which the family was very concerned.  She also had a slow healing abrasion on the left knee.  Probably slow healing secondary to left-sided vascular insufficiency.  She understood the plan for the procedure this was discussed extensively in the office as I drew them pictures and showed them illustrations on the wall that we would repair the infrarenal aortic aneurysm and perform a femoral to femoral bypass graft concomitantly to supply flow into the ischemic left leg.  They were aware of the risk of stroke bleeding infection death renal failure and limb loss and graft failure and graft infection and agreed to proceed no guarantees were made as to outcome.  That was explained to the patient  again on the second occasion in the preoperative area this morning and she agreed to proceed        Description: Supine position sterile prep and drape general endotracheal anesthesia antibiotics given.  The femoral arteries were identified by cutdown bilaterally they were rockhard calcific arteries and they could even be visualized under fluoroscopy without the use of contrast due to their extensive calcification.  The patient was heparinized and an 035 guide was placed through the right iliac system into the mid descending thoracic aorta using fluoroscopic visualization.  An aortogram demonstrated the renal arteries were single and patent bilaterally and there was no significant left renal artery stenosis which had been suggested by the preoperative CT scan.  A [place] excluder device was placed main body right approximately 1 cm below the renal arteries size 26 x 14 x 12.  We then placed a second [place] excluder device main body right size 26 x 14 x 12 extending into the first device and landing this just below the renal orifices bilaterally.  Thus creating an aorto uni iliac configuration.  We then extended on into the right leg with a 12 x 10 covered limb extension graft.  Please note that this leg had previous stenting in the right iliac system and was narrowed these extensions were necessary to open up the flow through this region.  We then turned our attention to the bypass portion of the procedure      An 8 mm ringed PTFE propatent graft was then tunneled in the suprapubic space from the right femoral incision to the left femoral incision.  The patient had been heparinized.  The right common femoral artery was opened it was extensively diseased and we performed an extended endarterectomy but the calcium extended even proximal up into the iliac system I suspect this does not portend a good long-term prognosis.  The PTFE graft was fashioned and sutured with a 6-0 Prolene to the right common femoral arteriotomy  site.  We then turned our attention to the left groin and the left femoral artery was opened again extensive calcification and occluded proximally.  This endarterectomy was performed and similarly the plaque was sent to pathology.  The end of this graft was cut to length and fashion and sutured with a 6-0 Prolene to the left common femoral arteriotomy.  The clamps were then released and the graft flushed free of air and debris.  Following this we had a very good quality Doppler signal in the distal femoral artery distal to our anastomoses bilaterally a good quality signal in the proximal SFA and profundus femoral bilaterally.  Arteriogram post procedure had demonstrated no evidence of endoleak.  We also had no compromise of flow into the right hypogastric artery or compromise of flow into the renal arteries.  Both incisions were irrigated with antibiotics and closed with multiple layers of Vicryl suture skin staples were also added for additional security.  Estimated blood loss 150 mL.  Fluoroscopy time 4 minutes and 30 seconds total contrast given 50 mL.  Sponge and needle count reported as correct and posterior tibial pulses had Doppler signals post procedure there was no apparent early complications  Please note that portions of this note were completed with a voice recognition program. Efforts were made to edit the dictations, but occasionally words are mistranscribed.

## 2024-10-12 LAB
ANION GAP SERPL CALCULATED.3IONS-SCNC: 9 MMOL/L (ref 5–15)
BASOPHILS # BLD AUTO: 0.02 10*3/MM3 (ref 0–0.2)
BASOPHILS NFR BLD AUTO: 0.2 % (ref 0–1.5)
BUN SERPL-MCNC: 22 MG/DL (ref 8–23)
BUN/CREAT SERPL: 16.3 (ref 7–25)
CALCIUM SPEC-SCNC: 8.3 MG/DL (ref 8.6–10.5)
CHLORIDE SERPL-SCNC: 109 MMOL/L (ref 98–107)
CO2 SERPL-SCNC: 22 MMOL/L (ref 22–29)
CREAT SERPL-MCNC: 1.35 MG/DL (ref 0.57–1)
DEPRECATED RDW RBC AUTO: 50.6 FL (ref 37–54)
EGFRCR SERPLBLD CKD-EPI 2021: 42.1 ML/MIN/1.73
EOSINOPHIL # BLD AUTO: 0.27 10*3/MM3 (ref 0–0.4)
EOSINOPHIL NFR BLD AUTO: 3.3 % (ref 0.3–6.2)
ERYTHROCYTE [DISTWIDTH] IN BLOOD BY AUTOMATED COUNT: 14.4 % (ref 12.3–15.4)
GLUCOSE SERPL-MCNC: 96 MG/DL (ref 65–99)
HCT VFR BLD AUTO: 33.8 % (ref 34–46.6)
HGB BLD-MCNC: 10.6 G/DL (ref 12–15.9)
IMM GRANULOCYTES # BLD AUTO: 0.03 10*3/MM3 (ref 0–0.05)
IMM GRANULOCYTES NFR BLD AUTO: 0.4 % (ref 0–0.5)
LYMPHOCYTES # BLD AUTO: 1.03 10*3/MM3 (ref 0.7–3.1)
LYMPHOCYTES NFR BLD AUTO: 12.6 % (ref 19.6–45.3)
MCH RBC QN AUTO: 30.4 PG (ref 26.6–33)
MCHC RBC AUTO-ENTMCNC: 31.4 G/DL (ref 31.5–35.7)
MCV RBC AUTO: 96.8 FL (ref 79–97)
MONOCYTES # BLD AUTO: 0.92 10*3/MM3 (ref 0.1–0.9)
MONOCYTES NFR BLD AUTO: 11.2 % (ref 5–12)
NEUTROPHILS NFR BLD AUTO: 5.91 10*3/MM3 (ref 1.7–7)
NEUTROPHILS NFR BLD AUTO: 72.3 % (ref 42.7–76)
NRBC BLD AUTO-RTO: 0 /100 WBC (ref 0–0.2)
PLATELET # BLD AUTO: 185 10*3/MM3 (ref 140–450)
PMV BLD AUTO: 10.2 FL (ref 6–12)
POTASSIUM SERPL-SCNC: 4.8 MMOL/L (ref 3.5–5.2)
RBC # BLD AUTO: 3.49 10*6/MM3 (ref 3.77–5.28)
SODIUM SERPL-SCNC: 140 MMOL/L (ref 136–145)
WBC NRBC COR # BLD AUTO: 8.18 10*3/MM3 (ref 3.4–10.8)

## 2024-10-12 PROCEDURE — 99024 POSTOP FOLLOW-UP VISIT: CPT | Performed by: THORACIC SURGERY (CARDIOTHORACIC VASCULAR SURGERY)

## 2024-10-12 PROCEDURE — 99232 SBSQ HOSP IP/OBS MODERATE 35: CPT | Performed by: INTERNAL MEDICINE

## 2024-10-12 PROCEDURE — 85025 COMPLETE CBC W/AUTO DIFF WBC: CPT | Performed by: INTERNAL MEDICINE

## 2024-10-12 PROCEDURE — 80048 BASIC METABOLIC PNL TOTAL CA: CPT | Performed by: INTERNAL MEDICINE

## 2024-10-12 RX ADMIN — DOXYCYCLINE 100 MG: 100 CAPSULE ORAL at 20:02

## 2024-10-12 RX ADMIN — HYDROCODONE BITARTRATE AND ACETAMINOPHEN 1 TABLET: 7.5; 325 TABLET ORAL at 20:02

## 2024-10-12 RX ADMIN — ASPIRIN 81 MG: 81 TABLET, COATED ORAL at 08:42

## 2024-10-12 RX ADMIN — HYDROCODONE BITARTRATE AND ACETAMINOPHEN 1 TABLET: 7.5; 325 TABLET ORAL at 04:23

## 2024-10-12 RX ADMIN — ATORVASTATIN CALCIUM 80 MG: 40 TABLET, FILM COATED ORAL at 08:42

## 2024-10-12 RX ADMIN — ESCITALOPRAM OXALATE 10 MG: 10 TABLET ORAL at 08:42

## 2024-10-12 RX ADMIN — ALLOPURINOL 300 MG: 300 TABLET ORAL at 08:42

## 2024-10-12 RX ADMIN — HYDROCODONE BITARTRATE AND ACETAMINOPHEN 1 TABLET: 7.5; 325 TABLET ORAL at 08:42

## 2024-10-12 RX ADMIN — LISINOPRIL 30 MG: 20 TABLET ORAL at 08:42

## 2024-10-12 RX ADMIN — LISINOPRIL 30 MG: 20 TABLET ORAL at 20:02

## 2024-10-12 RX ADMIN — DOXYCYCLINE 100 MG: 100 CAPSULE ORAL at 08:42

## 2024-10-12 RX ADMIN — CLOPIDOGREL BISULFATE 75 MG: 75 TABLET ORAL at 08:42

## 2024-10-12 RX ADMIN — AMLODIPINE BESYLATE 10 MG: 10 TABLET ORAL at 08:42

## 2024-10-12 RX ADMIN — NEBIVOLOL 5 MG: 5 TABLET ORAL at 08:42

## 2024-10-12 NOTE — PROGRESS NOTES
"Critical Care Note     LOS: 2 days   Patient Care Team:  Yomaira Saldana PA as PCP - General (Family Medicine)  Tristan Springer MD as Consulting Physician (Internal Medicine)  Cuco Alicia MD as Consulting Physician (Cardiology)  Walt Graham DPM as Consulting Physician (Podiatry)  Derrick Black MD as Consulting Physician (Ophthalmology)  Mango Jane MD as Consulting Physician (Orthopedic Surgery)  Tyra Melissa MD as Consulting Physician (Nephrology)  Montez Barnes MD (Inactive) as Consulting Physician (Orthopedic Surgery)  Aaron Villarreal MD as Surgeon (Cardiothoracic Surgery)    Chief Complaint/Reason for visit:      Thoracoabdominal aortic aneurysm repair  COPD  Stage IIIb chronic kidney disease  Hypertension  Dyslipidemia      Subjective     Interval History:     Patient feels well this morning.  She does have some incisional pain in the groin.  She is tolerating a p.o. diet.  She has not had a bowel movement.  Urine output 700 mL yesterday.    Review of Systems:    All systems were reviewed and negative except as noted in subjective.    Medical history, surgical history, social history, family history reviewed    Objective     Intake/Output:    Intake/Output Summary (Last 24 hours) at 10/12/2024 1313  Last data filed at 10/12/2024 0800  Gross per 24 hour   Intake 540 ml   Output 500 ml   Net 40 ml       Nutrition:  Diet: Cardiac, Diabetic; Healthy Heart (2-3 Na+); Consistent Carbohydrate; Fluid Consistency: Thin (IDDSI 0)    Infusions:  niCARdipine, 5-15 mg/hr, Last Rate: Stopped (10/10/24 1628)          Telemetry: Sinus rhythm             Vital Signs  Blood pressure 98/52, pulse 66, temperature 98.5 °F (36.9 °C), temperature source Oral, resp. rate 20, height 162.6 cm (64\"), weight 65.8 kg (145 lb), SpO2 97%.    Physical Exam:  General Appearance:  Older woman supine in bed in no distress   Head:  Atraumatic   Eyes:          Conjunctiva pink   Ears:     Throat: Oral " "mucosa moist   Neck: Trachea midline, no palpable thyroid   Back:      Lungs:   Symmetric chest expansion without wheeze or rhonchi    Heart:  Regular rhythm, S1, S2 auscultated   Abdomen:   Bowel sounds present, soft   Rectal:   Deferred   Extremities: Groin with gel dressing and some surrounding ecchymosis, tender to palpation, no hematoma   Pulses: Doppler pulses   Skin: No rash   Lymph nodes:    Neurologic: Alert, oriented, speech fluent, can wiggle her toes bilaterally      Results Review:     I reviewed the patient's new clinical results.   Results from last 7 days   Lab Units 10/12/24  0432 10/11/24  0320 10/09/24  1229   SODIUM mmol/L 140 138 139   POTASSIUM mmol/L 4.8 4.1 4.2   CHLORIDE mmol/L 109* 107 104   CO2 mmol/L 22.0 21.0* 25.0   BUN mg/dL 22 17 16   CREATININE mg/dL 1.35* 1.12* 1.27*   CALCIUM mg/dL 8.3* 7.7* 8.8   GLUCOSE mg/dL 96 113* 110*     Results from last 7 days   Lab Units 10/12/24  0432 10/11/24  0320 10/09/24  1229   WBC 10*3/mm3 8.18 6.99 8.30   HEMOGLOBIN g/dL 10.6* 9.7* 12.5   HEMATOCRIT % 33.8* 30.2* 38.9   PLATELETS 10*3/mm3 185 178 274         No results found for: \"BLOODCX\"  No results found for: \"URINECX\"    I reviewed the patient's new imaging including images and reports.      All medications reviewed.   allopurinol, 300 mg, Oral, Daily  amLODIPine, 10 mg, Oral, Daily  aspirin, 81 mg, Oral, Daily  atorvastatin, 80 mg, Oral, Daily  clopidogrel, 75 mg, Oral, Daily  doxycycline, 100 mg, Oral, Q12H  escitalopram, 10 mg, Oral, Daily  lisinopril, 30 mg, Oral, Q12H  nebivolol, 5 mg, Oral, Daily          Assessment & Plan       Thoracoabdominal aortic aneurysm (TAAA) without rupture    Asthma    Primary hypertension    Hyperlipidemia    PVD (peripheral vascular disease) with claudication    COPD (chronic obstructive pulmonary disease)    Stage 3b chronic kidney disease      69-year-old woman, former smoker with COPD, hypertension, dyslipidemia, stage IIIb chronic kidney disease, found " to have left iliac artery occlusion and infrarenal abdominal aortic aneurysm.  On October 10 she underwent endograft repair of her abdominal aortic aneurysm with bilateral femoral cutdowns, right iliac stent, right and left femoral endarterectomy.  She did not receive blood products.  Hemoglobin is 10.6.  She has pedal pulses.  She had a low-grade fever last night 100.7.  White count is normal.  Incisions do not look purulent.  She is receiving doxycycline p.o.    She has a 30-pack-year history of tobacco abuse quitting in 2018.  She is on Stiolto as a maintenance inhaler and uses an albuterol inhaler as needed.  She does not require home oxygen.  She currently has no purulent sputum or active bronchospasm.  On 3 L nasal cannula her saturation is 97%.    Blood pressures ranging from .  She remains on her home dose of Bystolic, lisinopril and amlodipine.  Today her serum creatinine is 1.35, BUN 22, potassium 4.8.  Creatinine on admission was 1.27.    Blood glucoses are running .  She does not take any glucose lowering medications.  Her A1c is 5.20.    PLAN:    Stop Accu-Cheks  Mobilize as CT surgery allows  Monitor urine output, BUN, creatinine, potassium    Amlodipine, lisinopril, Bystolic    Aspirin, statin, Plavix  Monitor pedal pulse  Monitor incisions    As needed rescue inhaler  Start Spiriva daily and resume Stiolto at discharge      VTE Prophylaxis: defer to CT surgery    Stress Ulcer Prophylaxis: none    Alyssia Velásquez MD  10/12/24  13:13 EDT      Time: 20 minutes

## 2024-10-12 NOTE — PROGRESS NOTES
CTS Progress Note       LOS: 2 days   Patient Care Team:  Yomaira Saldana PA as PCP - General (Family Medicine)  Tristan Springer MD as Consulting Physician (Internal Medicine)  Cuco Alicia MD as Consulting Physician (Cardiology)  Walt Graham DPM as Consulting Physician (Podiatry)  Derrick Black MD as Consulting Physician (Ophthalmology)  Mango Jane MD as Consulting Physician (Orthopedic Surgery)  Tyra Melissa MD as Consulting Physician (Nephrology)  Montez Barnes MD (Inactive) as Consulting Physician (Orthopedic Surgery)  Aaron Villarreal MD as Surgeon (Cardiothoracic Surgery)    Chief Complaint: Thoracoabdominal aortic aneurysm (TAAA) without rupture    Vital Signs:  Temp:  [98.1 °F (36.7 °C)-100.7 °F (38.2 °C)] 98.8 °F (37.1 °C)  Heart Rate:  [58-81] 74  Resp:  [16-20] 20  BP: ()/(36-72) 128/52  Arterial Line BP: (1-114)/(1-46) 107/46    Physical Exam: Feet warm and viable groins are satisfactory with expected bruising       Results:     Results from last 7 days   Lab Units 10/12/24  0432   WBC 10*3/mm3 8.18   HEMOGLOBIN g/dL 10.6*   HEMATOCRIT % 33.8*   PLATELETS 10*3/mm3 185     Results from last 7 days   Lab Units 10/12/24  0432   SODIUM mmol/L 140   POTASSIUM mmol/L 4.8   CHLORIDE mmol/L 109*   CO2 mmol/L 22.0   BUN mg/dL 22   CREATININE mg/dL 1.35*   GLUCOSE mg/dL 96   CALCIUM mg/dL 8.3*           Imaging Results (Last 24 Hours)       ** No results found for the last 24 hours. **            Assessment      Thoracoabdominal aortic aneurysm (TAAA) without rupture    Asthma    Primary hypertension    Hyperlipidemia    PVD (peripheral vascular disease) with claudication    COPD (chronic obstructive pulmonary disease)    Stage 3b chronic kidney disease        Plan   Transfer to telemetry  Do not sit at 90 degrees  Anticipate discharge home tomorrow with prescription for Plavix and doxycycline    Please note that portions of this note were completed with a voice  recognition program. Efforts were made to edit the dictations, but occasionally words are mistranscribed.    Aaron Villarreal MD  10/12/24  05:39 EDT

## 2024-10-13 ENCOUNTER — READMISSION MANAGEMENT (OUTPATIENT)
Dept: CALL CENTER | Facility: HOSPITAL | Age: 69
End: 2024-10-13
Payer: MEDICARE

## 2024-10-13 VITALS
HEIGHT: 64 IN | WEIGHT: 145 LBS | HEART RATE: 69 BPM | BODY MASS INDEX: 24.75 KG/M2 | TEMPERATURE: 99.7 F | RESPIRATION RATE: 18 BRPM | OXYGEN SATURATION: 90 % | SYSTOLIC BLOOD PRESSURE: 100 MMHG | DIASTOLIC BLOOD PRESSURE: 49 MMHG

## 2024-10-13 LAB
BH BB BLOOD EXPIRATION DATE: NORMAL
BH BB BLOOD EXPIRATION DATE: NORMAL
BH BB BLOOD TYPE BARCODE: 6200
BH BB BLOOD TYPE BARCODE: 6200
BH BB DISPENSE STATUS: NORMAL
BH BB DISPENSE STATUS: NORMAL
BH BB PRODUCT CODE: NORMAL
BH BB PRODUCT CODE: NORMAL
BH BB UNIT NUMBER: NORMAL
BH BB UNIT NUMBER: NORMAL
CROSSMATCH INTERPRETATION: NORMAL
CROSSMATCH INTERPRETATION: NORMAL
UNIT  ABO: NORMAL
UNIT  ABO: NORMAL
UNIT  RH: NORMAL
UNIT  RH: NORMAL

## 2024-10-13 PROCEDURE — 99024 POSTOP FOLLOW-UP VISIT: CPT | Performed by: THORACIC SURGERY (CARDIOTHORACIC VASCULAR SURGERY)

## 2024-10-13 RX ORDER — DOXYCYCLINE 100 MG/1
100 CAPSULE ORAL EVERY 12 HOURS SCHEDULED
Qty: 10 CAPSULE | Refills: 0 | Status: SHIPPED | OUTPATIENT
Start: 2024-10-13 | End: 2024-10-21

## 2024-10-13 RX ORDER — CLOPIDOGREL BISULFATE 75 MG/1
75 TABLET ORAL DAILY
Qty: 30 TABLET | Refills: 5 | Status: SHIPPED | OUTPATIENT
Start: 2024-10-13

## 2024-10-13 RX ADMIN — ESCITALOPRAM OXALATE 10 MG: 10 TABLET ORAL at 09:07

## 2024-10-13 RX ADMIN — ASPIRIN 81 MG: 81 TABLET, COATED ORAL at 09:07

## 2024-10-13 RX ADMIN — DOXYCYCLINE 100 MG: 100 CAPSULE ORAL at 09:07

## 2024-10-13 RX ADMIN — ATORVASTATIN CALCIUM 80 MG: 40 TABLET, FILM COATED ORAL at 09:06

## 2024-10-13 RX ADMIN — ALLOPURINOL 300 MG: 300 TABLET ORAL at 09:06

## 2024-10-13 RX ADMIN — CLOPIDOGREL BISULFATE 75 MG: 75 TABLET ORAL at 09:07

## 2024-10-13 NOTE — PROGRESS NOTES
CTS Progress Note       LOS: 3 days   Patient Care Team:  Yomaira Saldana PA as PCP - General (Family Medicine)  Tristan Springer MD as Consulting Physician (Internal Medicine)  Cuco Alicia MD as Consulting Physician (Cardiology)  Walt Graham DPM as Consulting Physician (Podiatry)  Derrick Black MD as Consulting Physician (Ophthalmology)  Mango Jane MD as Consulting Physician (Orthopedic Surgery)  Tyra Melissa MD as Consulting Physician (Nephrology)  Montez Barnes MD (Inactive) as Consulting Physician (Orthopedic Surgery)  Aaron Villarreal MD as Surgeon (Cardiothoracic Surgery)    Chief Complaint: Thoracoabdominal aortic aneurysm (TAAA) without rupture    Vital Signs:  Temp:  [98.2 °F (36.8 °C)-98.7 °F (37.1 °C)] 98.5 °F (36.9 °C)  Heart Rate:  [60-79] 61  Resp:  [16-20] 18  BP: ()/(43-69) 133/60    Physical Exam:       Results:     Results from last 7 days   Lab Units 10/12/24  0432   WBC 10*3/mm3 8.18   HEMOGLOBIN g/dL 10.6*   HEMATOCRIT % 33.8*   PLATELETS 10*3/mm3 185     Results from last 7 days   Lab Units 10/12/24  0432   SODIUM mmol/L 140   POTASSIUM mmol/L 4.8   CHLORIDE mmol/L 109*   CO2 mmol/L 22.0   BUN mg/dL 22   CREATININE mg/dL 1.35*   GLUCOSE mg/dL 96   CALCIUM mg/dL 8.3*           Imaging Results (Last 24 Hours)       ** No results found for the last 24 hours. **            Assessment      Thoracoabdominal aortic aneurysm (TAAA) without rupture    Asthma    Primary hypertension    Hyperlipidemia    PVD (peripheral vascular disease) with claudication    COPD (chronic obstructive pulmonary disease)    Stage 3b chronic kidney disease        Plan   Home today with doxycycline 100 mg p.o. twice daily for 5 days  Plavix prescription at discharge  Follow-up my office Monday, October 21 for staple removal  Patient to remove groin dressings Tuesday, October 15    Please note that portions of this note were completed with a voice recognition program. Efforts were  made to edit the dictations, but occasionally words are mistranscribed.    Aaron Villarreal MD  10/13/24  07:15 EDT

## 2024-10-13 NOTE — NURSING NOTE
Upon examining pt incisional sites this morning, I palpated and pt complained of tenderness, pain, and a firm mass above dressings across lower umbilicus and pelvic region. I paged MD Villarreal and on Call PHOENIX Brown via exchange and also messaged in secure chat. Kevin came to see and exam pt and stated it was normal findings after the graft and pt could still be discharged and should remove badges later this week.     Patient expressed no known problems or needs

## 2024-10-13 NOTE — PLAN OF CARE
Goal Outcome Evaluation:  Plan of Care Reviewed With: patient        Progress: improving  Outcome Evaluation: Pt on 3L NC, NSR on the monitor, VSS. No complaints of pain this shift. Pt on PO doxycycline. Bilateral femoral incision site dressing in place and intact, unable to visualize, no drainage noted. Some lower abdomen bruising noted. No acute changes during shift. Will continue with POC.     Radha Barker RN

## 2024-10-13 NOTE — OUTREACH NOTE
Prep Survey      Flowsheet Row Responses   [facility] patient discharged from? [place]   Is LACE score < 7 ? No   Eligibility [facility]   Date of Admission 10/10/24   Date of Discharge 10/13/24   Discharge diagnosis ABDOMINAL AORTIC ANEURYSM REPAIR WITH ENDOGRAFT, BILATERAL FEMORAL CUTDOWNS, AORTAGRAM, ENDOVASCULAR REPAIR, INFRARENAL ABDOMINAL AORTIC ANEURYSM, RIGHT ILLIAC STENT, RIGHT AND LEFT FEMORAL ENDARTECTOMY RENAL ARTERY STENT FEMORAL FEMORAL BYPASS WITH PTFE GRAFT   Does the patient have one of the following disease processes/diagnoses(primary or secondary)? Cardiothoracic surgery   Prep survey completed? Yes            Alyssa DAVIS - Registered Nurse

## 2024-10-14 ENCOUNTER — TELEPHONE (OUTPATIENT)
Dept: CARDIOLOGY | Facility: CLINIC | Age: 69
End: 2024-10-14
Payer: MEDICARE

## 2024-10-14 ENCOUNTER — TELEPHONE (OUTPATIENT)
Dept: CARDIAC SURGERY | Facility: CLINIC | Age: 69
End: 2024-10-14
Payer: MEDICARE

## 2024-10-14 ENCOUNTER — TRANSITIONAL CARE MANAGEMENT TELEPHONE ENCOUNTER (OUTPATIENT)
Dept: CALL CENTER | Facility: HOSPITAL | Age: 69
End: 2024-10-14
Payer: MEDICARE

## 2024-10-14 LAB
CYTO UR: NORMAL
LAB AP CASE REPORT: NORMAL
LAB AP CLINICAL INFORMATION: NORMAL
PATH REPORT.FINAL DX SPEC: NORMAL
PATH REPORT.GROSS SPEC: NORMAL

## 2024-10-14 NOTE — TELEPHONE ENCOUNTER
Caller: Judy Clark    Relationship to patient: Emergency Contact    Best call back number: [phone]    Chief complaint: SCHEDULING     Type of visit: POST OP    Requested date: THIS WEEK     If rescheduling, when is the original appointment: N/A     Additional notes:PTS SISTER CALLED AND STATES THAT HER DISCHARGE NOTES INSTRUCT HER TO SCHEDULE A POST OP FOR THIS WEEK. HUB DID NOT SEE SCHEDULING TIME LINE ON DISCHARGE. PLEASE GIVE HER A CALL BACK TO SCHEDULE POST OP. THANK YOU

## 2024-10-14 NOTE — TELEPHONE ENCOUNTER
Called patient to discuss, at time of phone call BP was 140s/60s. Explained to patient Dr Rollins recommendation to go to ED for BP < 90/60. Patient verbalized understanding

## 2024-10-14 NOTE — TELEPHONE ENCOUNTER
Caller: Judy Clark    Relationship to patient: Emergency Contact    Best call back number: [phone]    Chief complaint:     Type of visit: FOLLOW UP    Requested date: ASAP     If rescheduling, when is the original appointment: 2.13.2025     Additional notes:PATIENT HAD AN AORTIC ANEURYSM REPAIR, FEN REPAIR AND HER BP IS RUNNING LOW. PLEASE CALL THE ABOVE TO RES ASAP.

## 2024-10-14 NOTE — OUTREACH NOTE
Call Center TCM Note      Flowsheet Row Responses   [facility] patient discharged from? [place]   Does the patient have one of the following disease processes/diagnoses(primary or secondary)? Cardiothoracic surgery   TCM attempt successful? Yes   Call start time 1459   Call end time 1504   Discharge diagnosis ABDOMINAL AORTIC ANEURYSM REPAIR WITH ENDOGRAFT, BILATERAL FEMORAL CUTDOWNS, AORTAGRAM, ENDOVASCULAR REPAIR, INFRARENAL ABDOMINAL AORTIC ANEURYSM, RIGHT ILLIAC STENT, RIGHT AND LEFT FEMORAL ENDARTECTOMY RENAL ARTERY STENT FEMORAL FEMORAL BYPASS WITH PTFE GRAFT   Person spoke with today (if not patient) and relationship patient   Meds reviewed with patient/caregiver? Yes   Is the patient having any side effects they believe may be caused by any medication additions or changes? No   Does the patient have all medications related to this admission filled (includes all antibiotics, pain medications, cardiac medications, etc.) Yes   Is the patient taking all medications as directed (includes completed medication regime)? Yes   Comments Scheduled a hospital followup with PCP, Yomaira Saldana on 10/24 at 10 am. Patient will followup with CTS on 10/21   Does the patient have an appointment with their PCP within 7-14 days of discharge? Yes   Psychosocial issues? No   Did the patient receive a copy of their discharge instructions? Yes   Nursing interventions Reviewed instructions with patient   What is the patient's perception of their health status since discharge? Improving   Nursing interventions Nurse provided patient education   Nursing interventions Reassured on normal signs of recovery   Is the patient/caregiver able to teach back steps to recovery at home? Set small, achievable goals for return to baseline health, Rest and rebuild strength, gradually increase activity   Is the patient /caregiver able to teach back the importance of cardiac rehab? Yes   If the patient is a current smoker, are they able to  teach back resources for cessation? Not a smoker   Is the patient/caregiver able to teach back the hierarchy of who to call/visit for symptoms/problems? PCP, Specialist, Home health nurse, Urgent Care, ED, 911 Yes   TCM call completed? Yes   Call end time 1504   Would this patient benefit from a Referral to [facility] Social Work? No   Is the patient interested in additional calls from an ambulatory ? No            Jaspal Olivares RN    10/14/2024, 15:04 EDT

## 2024-10-16 DIAGNOSIS — E79.0 HYPERURICEMIA: ICD-10-CM

## 2024-10-16 RX ORDER — ALLOPURINOL 300 MG/1
300 TABLET ORAL DAILY
Qty: 90 TABLET | Refills: 1 | Status: SHIPPED | OUTPATIENT
Start: 2024-10-16

## 2024-10-16 NOTE — PROGRESS NOTES
[facility] Cardiothoracic Surgery Office Follow Up Note     Date of Encounter: 10/21/2024     Name: Orin Munoz  : 1955     Referred By: No ref. provider found  PCP: Yomaira Saldana PA    Chief Complaint:    Chief Complaint   Patient presents with    Follow-up     Hospital follow up s/p EVAr, fem-fem bypass and bilateral femoral endarterectomy       Subjective      History of Present Illness:    Orin Munoz is a 69 y.o. female former smoker with history of lung nodules, COPD, HTN, HLD on statin therapy, CKD III, renal artery stenosis, carotid disease, CAD, PAD with left iliac artery occlusion, and AAA s/p EVAR with uniiliac configuration and extension limb with right femoral to left femoral artery bypass with 8mm PTFE graft via bilateral femoral artery cutdown and endarterectomy of the right and left common femoral artery on 10/10/2024 with Dr Villarreal. Pt had uneventful post-operative course and was discharged on POD # 3 with no readmissions; no discharge summary for review.   She presents today as a short interval 1 week follow-up for staple removal.She has had no groin site healing concerns and has been compliant with Dial soap washes. She reports near total resolution of her preoperative left leg and lower back pain.     Review of Systems:  Review of Systems   Constitutional: Positive for decreased appetite (improving). Negative for chills, diaphoresis, fever, malaise/fatigue, night sweats, weight gain and weight loss.   HENT: Negative.  Negative for hoarse voice.    Eyes: Negative.  Negative for blurred vision, double vision and visual disturbance.   Cardiovascular:  Positive for dyspnea on exertion (some gasping but no BELLO). Negative for chest pain, claudication, irregular heartbeat, leg swelling, near-syncope, orthopnea, palpitations, paroxysmal nocturnal dyspnea and syncope.   Respiratory:  Positive for cough. Negative for hemoptysis, shortness of breath, sputum production and  wheezing.    Hematologic/Lymphatic: Negative for adenopathy and bleeding problem. Bruises/bleeds easily.   Skin: Negative.  Negative for color change, nail changes, poor wound healing and rash.   Musculoskeletal:  Positive for back pain (chronic lumbar). Negative for falls and muscle cramps.   Gastrointestinal:  Positive for diarrhea. Negative for abdominal pain, dysphagia and heartburn.   Genitourinary: Negative.  Negative for flank pain.   Neurological:  Positive for dizziness (not often) and numbness (left inner thigh). Negative for brief paralysis, disturbances in coordination, focal weakness, headaches, light-headedness, loss of balance, paresthesias, sensory change, vertigo and weakness.   Psychiatric/Behavioral: Negative.  Negative for depression and suicidal ideas.    Allergic/Immunologic: Negative for persistent infections.       I have reviewed the following portions of the patient's history: problem list, current medications, allergies, past surgical history, past medical history, past social history, past family history, and ROS and confirm it's accurate.    Allergies:  Allergies   Allergen Reactions    Penicillins Hives    Metaproterenol Other (See Comments)     Pt did not recognize this med     Ipratropium Bromide Anxiety    Metoprolol Hives    Other Hives     Alupent        Medications:      Current Outpatient Medications:     albuterol sulfate  (90 Base) MCG/ACT inhaler, Inhale 2 puffs Every 4 (Four) Hours As Needed for Wheezing., Disp: 10.8 g, Rfl: 1    allopurinol (ZYLOPRIM) 300 MG tablet, TAKE 1 TABLET BY MOUTH DAILY, Disp: 90 tablet, Rfl: 1    amLODIPine (NORVASC) 10 MG tablet, Take 1 tablet by mouth Daily., Disp: 90 tablet, Rfl: 1    aspirin 81 MG EC tablet, Take 1 tablet by mouth Daily., Disp: , Rfl:     atorvastatin (LIPITOR) 80 MG tablet, Take 1 tablet by mouth Daily., Disp: 90 tablet, Rfl: 3    clopidogrel (PLAVIX) 75 MG tablet, Take 1 tablet by mouth Daily., Disp: 30 tablet, Rfl: 5     escitalopram (LEXAPRO) 10 MG tablet, Take 1 tablet by mouth Daily., Disp: 90 tablet, Rfl: 1    Evolocumab (REPATHA) solution auto-injector SureClick injection, Inject 1 mL under the skin into the appropriate area as directed Every 14 (Fourteen) Days., Disp: 6 mL, Rfl: 4    ibandronate (BONIVA) 150 MG tablet, Take 1 tablet by mouth Every 30 (Thirty) Days., Disp: 3 tablet, Rfl: 4    icosapent ethyl (Vascepa) 1 g capsule capsule, Take 2 g by mouth 2 (Two) Times a Day With Meals., Disp: 120 capsule, Rfl: 11    lisinopril (PRINIVIL,ZESTRIL) 30 MG tablet, Take 1 tablet by mouth Every 12 (Twelve) Hours., Disp: 180 tablet, Rfl: 3    nebivolol (BYSTOLIC) 5 MG tablet, Take 1 tablet by mouth Daily., Disp: 90 tablet, Rfl: 1    tiotropium bromide-olodaterol (Stiolto Respimat) 2.5-2.5 MCG/ACT aerosol solution inhaler, Inhale 2 puffs Daily., Disp: 12 g, Rfl: 1  No current facility-administered medications for this visit.    History:   Past Medical History:   Diagnosis Date    Alcoholism     Allergic 2023    Penicillin,alupent    Ankle sprain     N/A    Arthritis 15 years ago    Arthritis of back Yrs.ago    N/A    Arthritis of neck Last year    Asthma 20 years ago    Cataract 2021    Chronic pain disorder     COPD (chronic obstructive pulmonary disease) 2015    Coronary artery disease 2018    Acute pulmonary hemmorage of the abdomen    Fracture, foot 2018    Had surgery    Hip arthrosis Years ago    Hyperlipidemia 2015    Hypertension 2000    Knee sprain 2023    Knee swelling A long time    Low back pain N/A    Low back strain Last few years    Was told i had osteo    Osteopenia 2018    Osteoporosis     PAD (peripheral artery disease)     Periarthritis of shoulder Last year    Peripheral neuropathy     Pneumonia 2022    Hospitalized for 4 days.    Renal insufficiency 2022    Scoliosis Last year    Shingles 2009    Visual impairment 1958    Wears eyeglasses     Wrist sprain 2018       Past Surgical History:   Procedure Laterality  Date    ABDOMINAL AORTIC ANEURYSM REPAIR WITH ENDOGRAFT N/A 10/10/2024    Procedure: ABDOMINAL AORTIC ANEURYSM REPAIR WITH ENDOGRAFT, BILATERAL FEMORAL CUTDOWNS, AORTAGRAM, ENDOVASCULAR REPAIR, INFRARENAL ABDOMINAL AORTIC ANEURYSM, RIGHT ILLIAC STENT, RIGHT AND LEFT FEMORAL ENDARTECTOMY;  Surgeon: Aaron Villarreal MD;  Location[facility] HYBRID OR;  Service: Vascular;  Laterality: N/A;  DOSE 233 MGY  FT 4 MIN 30 SEC  CONTRAST 50 ML    ANGIOPLASTY RENAL ARTERY Left 10/10/2024    Procedure: RENAL ARTERY STENT;  Surgeon: Aaron Villarreal MD;  Location:  [facility] HYBRID OR;  Service: Vascular;  Laterality: Left;    APPENDECTOMY  1971    CHOLECYSTECTOMY      COLONOSCOPY  2016    EYE SURGERY Right     Cataract    FEMORAL FEMORAL BYPASS N/A 10/10/2024    Procedure: FEMORAL FEMORAL BYPASS WITH PTFE GRAFT;  Surgeon: Aaron Villarreal MD;  Location:  [facility] OR;  Service: Vascular;  Laterality: N/A;    FOOT SURGERY Bilateral      done surgery debridement    HYSTERECTOMY  18 years old       Social History     Socioeconomic History    Marital status:    Tobacco Use    Smoking status: Former     Current packs/day: 0.00     Average packs/day: 1 pack/day for 30.3 years (30.3 ttl pk-yrs)     Types: Cigarettes     Start date: 1988     Quit date: 2018     Years since quittin.4    Smokeless tobacco: Never   Vaping Use    Vaping status: Never Used   Substance and Sexual Activity    Alcohol use: Not Currently     Alcohol/week: 3.0 standard drinks of alcohol     Types: 3 Shots of liquor per week     Comment: 1 drink every week typically    Drug use: Not Currently     Types: Marijuana     Comment: quit 2024    Sexual activity: Defer     Partners: Male     Birth control/protection: Condom, Birth control pill, Hysterectomy        Family History   Problem Relation Age of Onset    Arthritis Mother     COPD Mother     Heart disease Mother     Hyperlipidemia Mother     Diabetes Maternal  "Grandmother     Osteoporosis Maternal Grandmother         In lower back    Alcohol abuse Brother         Passed away in 2021    Drug abuse Brother     Cancer Brother         Retnal cancer    Early death Brother         Retinal cancer    Heart disease Sister        Objective   Physical Exam:  Vitals:    10/21/24 0840   BP: 110/60   BP Location: Left arm   Patient Position: Sitting   Pulse: 58   Temp: 96.9 °F (36.1 °C)   SpO2: 96%   Weight: 65.4 kg (144 lb 3.2 oz)   Height: 162.6 cm (64.02\")  Comment: pt reported      Body mass index is 24.74 kg/m².    Physical Exam  Vitals and nursing note reviewed.   Constitutional:       Appearance: Normal appearance.   Cardiovascular:      Rate and Rhythm: Normal rate and regular rhythm.      Pulses: No decreased pulses.           Dorsalis pedis pulses are 1+ on the right side and 1+ on the left side.        Posterior tibial pulses are 1+ on the right side and 1+ on the left side.      Heart sounds: Normal heart sounds, S1 normal and S2 normal. No murmur heard.  Pulmonary:      Effort: Pulmonary effort is normal.      Breath sounds: Normal breath sounds.   Abdominal:      Palpations: Abdomen is soft.   Musculoskeletal:         General: Normal range of motion.      Right lower leg: No edema.      Left lower leg: No edema.   Feet:      Right foot:      Skin integrity: Skin integrity normal. No ulcer, skin breakdown, callus or dry skin.      Left foot:      Skin integrity: Skin integrity normal. No ulcer, skin breakdown, callus or dry skin.      Comments: Toes: pink, warm, dry without delayed cap refill   Skin:     General: Skin is warm and dry.      Capillary Refill: Capillary refill takes less than 2 seconds.      Comments: Bilateral femoral incision: well healing with staples intact and wound edges well approximated, no surround erythema, edema, warmth, drainage or hematoma.    Neurological:      General: No focal deficit present.      Mental Status: She is alert and oriented to " person, place, and time. Mental status is at baseline.   Psychiatric:         Mood and Affect: Mood normal.         Behavior: Behavior normal.               Imaging/Labs:  CT Angio Abdominal Aorta Bilateral Iliofem Runoff (08/18/2024 11:26)   1. Infrarenal abdominal aortic aneurysm measuring 4.4 x 4.1 cm with moderate amount of mural thrombus.  2. Occluded left common iliac artery stent with a patent right common iliac artery stent.  3. Multifocal stenosis, left greater than right of the superficial femoral arteries, likely hemodynamically significant on the left  4. Three-vessel runoff to the bilateral lower extremities  5. Multifocal stenosis of the left renal artery with left renal atrophy.   Electronically Signed: Braden Mantilla MD         Assessment / Plan      Assessment / Plan:  Diagnoses and all orders for this visit:    1. S/P aneurysm repair (Primary)    2. S/P femoral-femoral bypass surgery       left iliac artery occlusion and AAA   s/p EVAR with uniiliac configuration and extension limb with right femoral to left femoral artery bypass with 8mm PTFE graft via bilateral femoral artery cutdown and endarterectomy of the right and left common femoral artery on 10/10/2024 with Dr Villarreal.  uneventful post-operative course, no readmissions  1 week wound check - no groin site healing concerns and compliant with wound care  On exam she has well healing incisions with staples intact, wound edges approximated, no surrounding erythema or underlying fluctuation, scant amount of induration consistent with hematoma this early post-op   Palpable pedal pulses  Staples removed without complication. Pt instructed to continue wound care with antibacterial soap washes and avoidance of 90 degree sitting   Wound check in 4 weeks  Compliant with DAPT and statin therapy   Will require post-op imaging at 6-months per Dr Villarreal's guidelines    Follow Up:   Return in about 4 weeks (around 11/18/2024) for Wound check.   Or sooner  for any further concerns or worsening sign and symptoms. If unable to reach us in the office please dial 911 or go to the nearest emergency department.      PHOENIX Forbes  [facility] Cardiothoracic Surgery

## 2024-10-21 ENCOUNTER — OFFICE VISIT (OUTPATIENT)
Dept: CARDIAC SURGERY | Facility: CLINIC | Age: 69
End: 2024-10-21
Payer: MEDICARE

## 2024-10-21 VITALS
SYSTOLIC BLOOD PRESSURE: 110 MMHG | HEIGHT: 64 IN | DIASTOLIC BLOOD PRESSURE: 60 MMHG | WEIGHT: 144.2 LBS | TEMPERATURE: 96.9 F | OXYGEN SATURATION: 96 % | HEART RATE: 58 BPM | BODY MASS INDEX: 24.62 KG/M2

## 2024-10-21 DIAGNOSIS — Z86.79 S/P ANEURYSM REPAIR: Primary | ICD-10-CM

## 2024-10-21 DIAGNOSIS — Z95.828 S/P FEMORAL-FEMORAL BYPASS SURGERY: ICD-10-CM

## 2024-10-21 DIAGNOSIS — Z98.890 S/P ANEURYSM REPAIR: Primary | ICD-10-CM

## 2024-10-21 PROCEDURE — 3074F SYST BP LT 130 MM HG: CPT | Performed by: NURSE PRACTITIONER

## 2024-10-21 PROCEDURE — 1159F MED LIST DOCD IN RCRD: CPT | Performed by: NURSE PRACTITIONER

## 2024-10-21 PROCEDURE — 1160F RVW MEDS BY RX/DR IN RCRD: CPT | Performed by: NURSE PRACTITIONER

## 2024-10-21 PROCEDURE — 99024 POSTOP FOLLOW-UP VISIT: CPT | Performed by: NURSE PRACTITIONER

## 2024-10-21 PROCEDURE — 3078F DIAST BP <80 MM HG: CPT | Performed by: NURSE PRACTITIONER

## 2024-10-22 ENCOUNTER — READMISSION MANAGEMENT (OUTPATIENT)
Dept: CALL CENTER | Facility: HOSPITAL | Age: 69
End: 2024-10-22
Payer: MEDICARE

## 2024-10-22 NOTE — OUTREACH NOTE
CT Surgery Week 2 Survey      Flowsheet Row Responses   [facility] patient discharged from? [place]   Does the patient have one of the following disease processes/diagnoses(primary or secondary)? Cardiothoracic surgery   Week 2 attempt successful? Yes   Call start time 1203   Call end time 1204   Discharge diagnosis ABDOMINAL AORTIC ANEURYSM REPAIR WITH ENDOGRAFT, BILATERAL FEMORAL CUTDOWNS, AORTAGRAM, ENDOVASCULAR REPAIR, INFRARENAL ABDOMINAL AORTIC ANEURYSM, RIGHT ILLIAC STENT, RIGHT AND LEFT FEMORAL ENDARTECTOMY RENAL ARTERY STENT FEMORAL FEMORAL BYPASS WITH PTFE GRAFT   Meds reviewed with patient/caregiver? Yes   Is the patient having any side effects they believe may be caused by any medication additions or changes? No   Does the patient have all medications related to this admission filled (includes all antibiotics, pain medications, cardiac medications, etc.) Yes   Is the patient taking all medications as directed (includes completed medication regime)? Yes   Does the patient have a primary care provider?  Yes   Does the patient have an appointment scheduled with their C/T surgeon? Yes   Has the patient kept scheduled appointments due by today? Yes   Has home health visited the patient within 72 hours of discharge? N/A   Psychosocial issues? No   Did the patient receive a copy of their discharge instructions? Yes   Nursing interventions Reviewed instructions with patient   What is the patient's perception of their health status since discharge? Improving   Is the patient/caregiver able to teach back signs and symptoms of incisional infection? Increased redness, swelling or pain at the incisonal site, Increased drainage or bleeding, Incisional warmth, Pus or odor from incision, Fever   Is the patient/caregiver able to teach back steps to recovery at home? Eat a well-balance diet, Rest and rebuild strength, gradually increase activity, Set small, achievable goals for return to baseline health   Week 2  call completed? Yes   Graduated Yes   Wrap up additional comments Pt states doing better but still slow to get around. Pt saw Dr who said to give healing a little more time.   Call end time 1204            Danielle VAZQUEZ - Registered Nurse

## 2024-10-24 ENCOUNTER — OFFICE VISIT (OUTPATIENT)
Dept: FAMILY MEDICINE CLINIC | Facility: CLINIC | Age: 69
End: 2024-10-24
Payer: MEDICARE

## 2024-10-24 VITALS
RESPIRATION RATE: 16 BRPM | DIASTOLIC BLOOD PRESSURE: 70 MMHG | SYSTOLIC BLOOD PRESSURE: 117 MMHG | OXYGEN SATURATION: 98 % | WEIGHT: 143 LBS | HEART RATE: 86 BPM | BODY MASS INDEX: 24.41 KG/M2 | HEIGHT: 64 IN

## 2024-10-24 DIAGNOSIS — F33.40 RECURRENT MAJOR DEPRESSIVE DISORDER, IN REMISSION: ICD-10-CM

## 2024-10-24 DIAGNOSIS — F41.1 GENERALIZED ANXIETY DISORDER: ICD-10-CM

## 2024-10-24 DIAGNOSIS — I10 PRIMARY HYPERTENSION: ICD-10-CM

## 2024-10-24 DIAGNOSIS — Z23 ENCOUNTER FOR IMMUNIZATION: Primary | ICD-10-CM

## 2024-10-24 PROCEDURE — 3074F SYST BP LT 130 MM HG: CPT | Performed by: PHYSICIAN ASSISTANT

## 2024-10-24 PROCEDURE — G0008 ADMIN INFLUENZA VIRUS VAC: HCPCS | Performed by: PHYSICIAN ASSISTANT

## 2024-10-24 PROCEDURE — 1125F AMNT PAIN NOTED PAIN PRSNT: CPT | Performed by: PHYSICIAN ASSISTANT

## 2024-10-24 PROCEDURE — 3078F DIAST BP <80 MM HG: CPT | Performed by: PHYSICIAN ASSISTANT

## 2024-10-24 PROCEDURE — 1111F DSCHRG MED/CURRENT MED MERGE: CPT | Performed by: PHYSICIAN ASSISTANT

## 2024-10-24 PROCEDURE — 99495 TRANSJ CARE MGMT MOD F2F 14D: CPT | Performed by: PHYSICIAN ASSISTANT

## 2024-10-24 PROCEDURE — 90662 IIV NO PRSV INCREASED AG IM: CPT | Performed by: PHYSICIAN ASSISTANT

## 2024-10-24 RX ORDER — AMLODIPINE BESYLATE 10 MG/1
10 TABLET ORAL DAILY
Qty: 90 TABLET | Refills: 1 | Status: SHIPPED | OUTPATIENT
Start: 2024-10-24

## 2024-10-24 RX ORDER — ESCITALOPRAM OXALATE 10 MG/1
10 TABLET ORAL DAILY
Qty: 90 TABLET | Refills: 1 | Status: SHIPPED | OUTPATIENT
Start: 2024-10-24

## 2024-10-24 RX ORDER — NEBIVOLOL 5 MG/1
5 TABLET ORAL DAILY
Qty: 90 TABLET | Refills: 1 | Status: SHIPPED | OUTPATIENT
Start: 2024-10-24

## 2024-10-24 NOTE — PROGRESS NOTES
Transitional Care Follow Up Visit  Subjective     Orin Munoz is a 69 y.o. female who presents for a transitional care management visit.    Within 48 business hours after discharge our office contacted her via telephone to coordinate her care and needs.      I reviewed and discussed the details of that call along with the discharge summary, hospital problems, inpatient lab results, inpatient diagnostic studies, and consultation reports with Orin.     Current outpatient and discharge medications have been reconciled for the patient.  Reviewed by: GABRIELLA Escobar          10/13/2024     2:23 PM   Date of TCM Phone Call   [facility]   Date of Admission 10/10/2024   Date of Discharge 10/13/2024     Risk for Readmission (LACE) Score: 11 (10/13/2024  6:00 AM)      History of Present Illness   Course During Hospital Stay: Patient was admitted to [facility] on 10/10/2024 for planned surgical procedure.  She underwent abdominal aortic aneurysm repair with endograft, bilateral femoral cutdowns, aortic gram, endovascular pair, infrarenal abdominal aortic aneurysm, right iliac stent, right and left femoral endarterectomy, renal artery stent, femoral femoral bypass with PTFE graft.  Her pre-op diagnosis was infrarenal aortic aneurysm, cerebral peripheral arterial vascular disease, occluded left common and external iliac artery and possible left renal artery stenosis and a former smoker.  Patient was discharged on 10/13/2024.  She is doing well after surgery.  She has already been seen for follow-up at the surgeons office by the nurse practitioner.     The following portions of the patient's history were reviewed and updated as appropriate: allergies, current medications, past family history, past medical history, past social history, past surgical history, and problem list.    Review of Systems   Constitutional:  Negative for fatigue.   Respiratory:  Negative for shortness of breath.  "   Cardiovascular:  Negative for chest pain and leg swelling.       Objective   /70 (BP Location: Right arm, Patient Position: Lying, Cuff Size: Adult)   Pulse 86   Resp 16   Ht 162.6 cm (64.02\")   Wt 64.9 kg (143 lb)   SpO2 98%   BMI 24.53 kg/m²   Physical Exam  Constitutional:       Appearance: Normal appearance.   Cardiovascular:      Rate and Rhythm: Normal rate and regular rhythm.   Pulmonary:      Effort: Pulmonary effort is normal.      Breath sounds: Normal breath sounds.   Musculoskeletal:      Cervical back: Normal range of motion and neck supple.   Neurological:      Mental Status: She is alert and oriented to person, place, and time.   Psychiatric:         Mood and Affect: Mood normal.         Behavior: Behavior normal.         Thought Content: Thought content normal.         Judgment: Judgment normal.         Assessment & Plan   Problems Addressed this Visit          Cardiac and Vasculature    Primary hypertension    Relevant Medications    amLODIPine (NORVASC) 10 MG tablet    nebivolol (BYSTOLIC) 5 MG tablet       Mental Health    Recurrent major depressive disorder, in remission    Relevant Medications    escitalopram (LEXAPRO) 10 MG tablet    Generalized anxiety disorder    Relevant Medications    escitalopram (LEXAPRO) 10 MG tablet     Other Visit Diagnoses       Encounter for immunization    -  Primary    Relevant Orders    Fluzone High-Dose 65+yrs (Completed)          Diagnoses         Codes Comments    Encounter for immunization    -  Primary ICD-10-CM: Z23  ICD-9-CM: V03.89     Primary hypertension     ICD-10-CM: I10  ICD-9-CM: 401.9     Generalized anxiety disorder     ICD-10-CM: F41.1  ICD-9-CM: 300.02     Recurrent major depressive disorder, in remission     ICD-10-CM: F33.40  ICD-9-CM: 296.35           Patient doing well today, refilled meds x 6 months.  No labs needed today.  Follow-up in 6 months.         "

## 2024-10-26 NOTE — DISCHARGE SUMMARY
CTS Discharge Summary    Patient Care Team:  Yomaira Saldana PA as PCP - General (Family Medicine)  Tristan Springer MD as Consulting Physician (Internal Medicine)  Cuco Alicia MD as Consulting Physician (Cardiology)  Walt Graham DPM as Consulting Physician (Podiatry)  Derrick Black MD as Consulting Physician (Ophthalmology)  Mango Jane MD as Consulting Physician (Orthopedic Surgery)  Tyra Melissa MD as Consulting Physician (Nephrology)  Montez Barnes MD (Inactive) as Consulting Physician (Orthopedic Surgery)  Aaron Villarreal MD as Surgeon (Cardiothoracic Surgery)      Date of Admission: 10/10/2024 10:31 AM  Date of Discharge: 10/13/2024    Discharge Diagnosis  Past Medical History:   Diagnosis Date    Alcoholism     Allergic 2023    Penicillin,alupent    Ankle sprain     N/A    Arthritis 15 years ago    Arthritis of back Yrs.ago    N/A    Arthritis of neck Last year    Asthma 20 years ago    Cataract 2021    Chronic pain disorder     COPD (chronic obstructive pulmonary disease) 2015    Coronary artery disease 2018    Acute pulmonary hemmorage of the abdomen    Fracture, foot 2018    Had surgery    Hip arthrosis Years ago    Hyperlipidemia 2015    Hypertension 2000    Knee sprain 2023    Knee swelling A long time    Low back pain N/A    Low back strain Last few years    Was told i had osteo    Osteopenia 2018    Osteoporosis     PAD (peripheral artery disease)     Periarthritis of shoulder Last year    Peripheral neuropathy     Pneumonia 2022    Hospitalized for 4 days.    Renal insufficiency 2022    Scoliosis Last year    Shingles 2009    Visual impairment 1958    Wears eyeglasses     Wrist sprain 2018         Thoracoabdominal aortic aneurysm (TAAA) without rupture    Asthma    Primary hypertension    Hyperlipidemia    PVD (peripheral vascular disease) with claudication    COPD (chronic obstructive pulmonary disease)    Stage 3b chronic kidney disease      History of  Present IllnessThis patient had been referred to my office with CT scan demonstrating an enlarging infrarenal aortic aneurysm and significant left leg pain with an occluded left common and external iliac artery by CT angiogram and also what appeared to be significant left renal artery stenosis for which the family was very concerned.  She also had a slow healing abrasion on the left knee.  Probably slow healing secondary to left-sided vascular insufficiency.  She understood the plan for the procedure this was discussed extensively in the office as I drew them pictures and showed them illustrations on the wall that we would repair the infrarenal aortic aneurysm and perform a femoral to femoral bypass graft concomitantly to supply flow into the ischemic left leg.  They were aware of the risk of stroke bleeding infection death renal failure and limb loss and graft failure and graft infection and agreed to proceed no guarantees were made as to outcome.  That was explained to the patient again on the second occasion in the preoperative area this morning and she agreed to proceed  Hospital course 69-year-old female underwent bilateral femoral artery cutdowns.  Catheter in aorta.  Aortogram.  Endovascular repair of an infrarenal abdominal aortic aneurysm with a [place] excluder device main body right size 26 x 14 x 12.  A second main body right size 26 x 14 x 12 in an aorto uniiliac configuration.  Extension limb on the right size 12 x 10       Right femoral artery to left femoral artery bypass graft with 8 mm PTFE ringed graft     Endarterectomy of the right and left common femoral artery  First postop day she is able to ambulate with assistance did not sit greater than 90 degrees and she was started on Plavix and it would be a discharge med they ran a antibiotics and extra 24 hours due to an open abrasion on the left knee and extensive prosthetic graft material and the patient was able to be discharged home to follow-up in  the office           Procedures Performed  Procedure(s):  ABDOMINAL AORTIC ANEURYSM REPAIR WITH ENDOGRAFT, BILATERAL FEMORAL CUTDOWNS, AORTAGRAM, ENDOVASCULAR REPAIR, INFRARENAL ABDOMINAL AORTIC ANEURYSM, RIGHT ILLIAC STENT, RIGHT AND LEFT FEMORAL ENDARTECTOMY  RENAL ARTERY STENT  FEMORAL FEMORAL BYPASS WITH PTFE GRAFT       Consults:   Consults       No orders found from 9/11/2024 to 10/11/2024.              Discharge Medications     Discharge Medications        New Medications        Instructions Start Date   clopidogrel 75 MG tablet  Commonly known as: PLAVIX   75 mg, Oral, Daily             Continue These Medications        Instructions Start Date   albuterol sulfate  (90 Base) MCG/ACT inhaler  Commonly known as: PROVENTIL HFA;VENTOLIN HFA;PROAIR HFA   2 puffs, Inhalation, Every 4 Hours PRN      aspirin 81 MG EC tablet   81 mg, Daily      atorvastatin 80 MG tablet  Commonly known as: LIPITOR   80 mg, Oral, Daily      Evolocumab solution auto-injector SureClick injection  Commonly known as: REPATHA   140 mg, Subcutaneous, Every 14 Days      ibandronate 150 MG tablet  Commonly known as: BONIVA   150 mg, Oral, Every 30 Days      icosapent ethyl 1 g capsule capsule  Commonly known as: Vascepa   2 g, Oral, 2 Times Daily With Meals      lisinopril 30 MG tablet  Commonly known as: PRINIVIL,ZESTRIL   30 mg, Oral, Every 12 Hours Scheduled      Stiolto Respimat 2.5-2.5 MCG/ACT aerosol solution inhaler  Generic drug: tiotropium bromide-olodaterol   2 puffs, Inhalation, Daily - RT               Discharge Diet: Needs healthy heart    Activity at Discharge:   Do not drive while taking narcotics    Follow-up Appointments  Future Appointments   Date Time Provider Department Center   11/7/2024  9:30 AM Yomaira Saldana PA [facility]   11/18/2024  9:30 AM Debbie Vasques APRN [facility]   2/13/2025  9:45 AM Cornelio Rollins MD [facility]          Taiwo Brown PA-C  10/26/24  11:32 EDT

## 2024-11-07 ENCOUNTER — OFFICE VISIT (OUTPATIENT)
Dept: FAMILY MEDICINE CLINIC | Facility: CLINIC | Age: 69
End: 2024-11-07
Payer: MEDICARE

## 2024-11-07 VITALS
DIASTOLIC BLOOD PRESSURE: 74 MMHG | BODY MASS INDEX: 24.5 KG/M2 | HEIGHT: 64 IN | SYSTOLIC BLOOD PRESSURE: 116 MMHG | WEIGHT: 143.5 LBS | OXYGEN SATURATION: 98 % | HEART RATE: 68 BPM

## 2024-11-07 DIAGNOSIS — Z79.899 HIGH RISK MEDICATION USE: ICD-10-CM

## 2024-11-07 DIAGNOSIS — J41.0 SIMPLE CHRONIC BRONCHITIS: ICD-10-CM

## 2024-11-07 DIAGNOSIS — D64.9 ANEMIA, UNSPECIFIED TYPE: ICD-10-CM

## 2024-11-07 DIAGNOSIS — M79.604 PAIN IN BOTH LOWER EXTREMITIES: Primary | ICD-10-CM

## 2024-11-07 DIAGNOSIS — M79.605 PAIN IN BOTH LOWER EXTREMITIES: Primary | ICD-10-CM

## 2024-11-07 DIAGNOSIS — N18.32 STAGE 3B CHRONIC KIDNEY DISEASE: ICD-10-CM

## 2024-11-07 DIAGNOSIS — E78.2 MIXED HYPERLIPIDEMIA: ICD-10-CM

## 2024-11-07 PROCEDURE — 3074F SYST BP LT 130 MM HG: CPT | Performed by: PHYSICIAN ASSISTANT

## 2024-11-07 PROCEDURE — 1125F AMNT PAIN NOTED PAIN PRSNT: CPT | Performed by: PHYSICIAN ASSISTANT

## 2024-11-07 PROCEDURE — 3078F DIAST BP <80 MM HG: CPT | Performed by: PHYSICIAN ASSISTANT

## 2024-11-07 RX ORDER — CLOPIDOGREL BISULFATE 75 MG/1
75 TABLET ORAL DAILY
Qty: 90 TABLET | Refills: 1 | Status: SHIPPED | OUTPATIENT
Start: 2024-11-07

## 2024-11-07 NOTE — ASSESSMENT & PLAN NOTE
Different type of pain than before surgery.  This really sounds like some kind of nerve irritation where she had the surgery.  Recommend she try over-the-counter capsaicin cream and discussed with surgeon.  There are some options for neuropathic pain that include amitriptyline at bedtime, changing her Lexapro to Cymbalta or possibly gabapentin.  She will try over-the-counter treatments and discussed with her surgeon and will try to give this some more time.

## 2024-11-07 NOTE — PROGRESS NOTES
Patient Office Visit      Patient Name: Orin Munoz  : 1955   MRN: [id number]     Chief Complaint:    Chief Complaint   Patient presents with    anemia    Leg Pain       History of Present Illness: Orin Munoz is a 69 y.o. female who is here today for follow-up.  I had seen her for a hospital follow-up visit 10/24/2024 after she had an aneurysm repair with a femoropopliteal bypass.  Her pain she was having from the peripheral vascular disease has improved but she is having a burning sensation in her thighs and says that her skin is sensitive to the touch.  She is wondering how long this will last.  This did start after surgery which was a month ago.  Patient had some anemia when discharged from the hospital was not present prior to her hospital stay.    Subjective      Review of Systems:         Past Medical History:   Past Medical History:   Diagnosis Date    Alcoholism     Allergic     Penicillin,alupent    Anemia 2024    Ankle sprain     N/A    Arthritis 15 years ago    Arthritis of back Yrs.ago    N/A    Arthritis of neck Last year    Asthma 20 years ago    Cataract     Chronic pain disorder     COPD (chronic obstructive pulmonary disease) 2015    Coronary artery disease 2018    Acute pulmonary hemmorage of the abdomen    Fracture, foot 2018    Had surgery    Hip arthrosis Years ago    Hyperlipidemia 2015    Hypertension 2000    Knee sprain     Knee swelling A long time    Low back pain N/A    Low back strain Last few years    Was told i had osteo    Osteopenia     Osteoporosis     PAD (peripheral artery disease)     Periarthritis of shoulder Last year    Peripheral neuropathy     Pneumonia     Hospitalized for 4 days.    Renal insufficiency     Scoliosis Last year    Shingles 2009    Visual impairment     Wears eyeglasses     Wrist sprain        Past Surgical History:   Past Surgical History:   Procedure Laterality Date    ABDOMINAL AORTIC ANEURYSM  REPAIR WITH ENDOGRAFT N/A 10/10/2024    Procedure: ABDOMINAL AORTIC ANEURYSM REPAIR WITH ENDOGRAFT, BILATERAL FEMORAL CUTDOWNS, AORTAGRAM, ENDOVASCULAR REPAIR, INFRARENAL ABDOMINAL AORTIC ANEURYSM, RIGHT ILLIAC STENT, RIGHT AND LEFT FEMORAL ENDARTECTOMY;  Surgeon: Aaron Villarreal MD;  Location: [facility] HYBRID OR;  Service: Vascular;  Laterality: N/A;  DOSE 233 MGY  FT 4 MIN 30 SEC  CONTRAST 50 ML    ANGIOPLASTY RENAL ARTERY Left 10/10/2024    Procedure: RENAL ARTERY STENT;  Surgeon: Aaron Villarreal MD;  Location:  [facility] HYBRID OR;  Service: Vascular;  Laterality: Left;    APPENDECTOMY  1971    CHOLECYSTECTOMY      COLONOSCOPY  2016    EYE SURGERY Right     Cataract    FEMORAL FEMORAL BYPASS N/A 10/10/2024    Procedure: FEMORAL FEMORAL BYPASS WITH PTFE GRAFT;  Surgeon: Aaron Villarreal MD;  Location:  [facility]BRID OR;  Service: Vascular;  Laterality: N/A;    FOOT SURGERY Bilateral      done surgery debridement    HYSTERECTOMY  18 years old       Family History:   Family History   Problem Relation Age of Onset    Arthritis Mother     COPD Mother     Heart disease Mother     Hyperlipidemia Mother     Diabetes Maternal Grandmother     Osteoporosis Maternal Grandmother         In lower back    Alcohol abuse Brother         Passed away in     Drug abuse Brother     Cancer Brother         Retnal cancer    Early death Brother         Retinal cancer    Heart disease Sister        Social History:   Social History     Socioeconomic History    Marital status:    Tobacco Use    Smoking status: Former     Current packs/day: 0.00     Average packs/day: 1 pack/day for 30.3 years (30.3 ttl pk-yrs)     Types: Cigarettes     Start date: 1988     Quit date: 2018     Years since quittin.5    Smokeless tobacco: Never   Vaping Use    Vaping status: Never Used   Substance and Sexual Activity    Alcohol use: Yes     Alcohol/week: 3.0 standard drinks of alcohol     Types: 3 Shots of liquor  "per week     Comment: 1 drink every week typically    Drug use: Not Currently     Types: Marijuana     Comment: quit 5/2024    Sexual activity: Not Currently     Partners: Male     Birth control/protection: Condom, Birth control pill, Hysterectomy       Allergies:   Allergies   Allergen Reactions    Penicillins Hives    Metaproterenol Other (See Comments)     Pt did not recognize this med     Ipratropium Bromide Anxiety    Metoprolol Hives    Other Hives     Alupent        Objective     Physical Exam:  Vital Signs:   Vitals:    11/07/24 0957   BP: 116/74   BP Location: Left arm   Patient Position: Sitting   Cuff Size: Adult   Pulse: 68   SpO2: 98%   Weight: 65.1 kg (143 lb 8 oz)   Height: 162.6 cm (64.02\")     Body mass index is 24.62 kg/m².   BMI is within normal parameters. No other follow-up for BMI required.       Physical Exam  Constitutional:       General: She is not in acute distress.     Appearance: Normal appearance.   Neurological:      Mental Status: She is alert.   Psychiatric:         Mood and Affect: Mood normal.         Behavior: Behavior normal.         Thought Content: Thought content normal.         Judgment: Judgment normal.         Procedures    Assessment / Plan      Assessment/Plan:   Diagnoses and all orders for this visit:    1. Pain in both lower extremities (Primary)  Assessment & Plan:  Different type of pain than before surgery.  This really sounds like some kind of nerve irritation where she had the surgery.  Recommend she try over-the-counter capsaicin cream and discussed with surgeon.  There are some options for neuropathic pain that include amitriptyline at bedtime, changing her Lexapro to Cymbalta or possibly gabapentin.  She will try over-the-counter treatments and discussed with her surgeon and will try to give this some more time.      2. Simple chronic bronchitis    3. Mixed hyperlipidemia  -     Lipid Panel    4. Anemia, unspecified type  Assessment & Plan:  Most likely some " acute blood loss after surgery, will check anemia studies.    Orders:  -     CBC Auto Differential  -     Iron Profile  -     Ferritin  -     Folate  -     Vitamin B12    5. High risk medication use  -     Comprehensive Metabolic Panel    Other orders  -     clopidogrel (PLAVIX) 75 MG tablet; Take 1 tablet by mouth Daily.  Dispense: 90 tablet; Refill: 1           Medications:     Current Outpatient Medications:     albuterol sulfate  (90 Base) MCG/ACT inhaler, Inhale 2 puffs Every 4 (Four) Hours As Needed for Wheezing., Disp: 10.8 g, Rfl: 1    allopurinol (ZYLOPRIM) 300 MG tablet, TAKE 1 TABLET BY MOUTH DAILY, Disp: 90 tablet, Rfl: 1    amLODIPine (NORVASC) 10 MG tablet, Take 1 tablet by mouth Daily., Disp: 90 tablet, Rfl: 1    aspirin 81 MG EC tablet, Take 1 tablet by mouth Daily., Disp: , Rfl:     atorvastatin (LIPITOR) 80 MG tablet, Take 1 tablet by mouth Daily., Disp: 90 tablet, Rfl: 3    clopidogrel (PLAVIX) 75 MG tablet, Take 1 tablet by mouth Daily., Disp: 90 tablet, Rfl: 1    escitalopram (LEXAPRO) 10 MG tablet, Take 1 tablet by mouth Daily., Disp: 90 tablet, Rfl: 1    Evolocumab (REPATHA) solution auto-injector SureClick injection, Inject 1 mL under the skin into the appropriate area as directed Every 14 (Fourteen) Days., Disp: 6 mL, Rfl: 4    ibandronate (BONIVA) 150 MG tablet, Take 1 tablet by mouth Every 30 (Thirty) Days., Disp: 3 tablet, Rfl: 4    icosapent ethyl (Vascepa) 1 g capsule capsule, Take 2 g by mouth 2 (Two) Times a Day With Meals., Disp: 120 capsule, Rfl: 11    lisinopril (PRINIVIL,ZESTRIL) 30 MG tablet, Take 1 tablet by mouth Every 12 (Twelve) Hours., Disp: 180 tablet, Rfl: 3    nebivolol (BYSTOLIC) 5 MG tablet, Take 1 tablet by mouth Daily., Disp: 90 tablet, Rfl: 1    tiotropium bromide-olodaterol (Stiolto Respimat) 2.5-2.5 MCG/ACT aerosol solution inhaler, Inhale 2 puffs Daily., Disp: 12 g, Rfl: 1        Follow Up:   Return in about 6 months (around 5/7/2025) for 30 minute  nabil.    Yomaira Saldana PA-C   [facility]     NOTE TO PATIENT: The 21st Century Cures Act makes medical notes like these available to patients in the interest of transparency. However, be advised this is a medical document. It is intended as peer to peer communication. It is written in medical language and may contain abbreviations or verbiage that are unfamiliar. It may appear blunt or direct. Medical documents are intended to carry relevant information, facts as evident, and the clinical opinion of the practitioner.   Answers submitted by the patient for this visit:  Other (Submitted on 11/5/2024)  Please describe your symptoms.: After surgery appointment  Have you had these symptoms before?: No  How long have you been having these symptoms?: 5-7 days  Please list any medications you are currently taking for this condition.: Plavix  Please describe any probable cause for these symptoms. : Had surgery 2 weeks ago  Primary Reason for Visit (Submitted on 11/5/2024)  What is the primary reason for your visit?: Problem Not Listed

## 2024-11-08 LAB
ALBUMIN SERPL-MCNC: 4.3 G/DL (ref 3.8–4.8)
ALP SERPL-CCNC: 104 IU/L (ref 44–121)
ALT SERPL-CCNC: 12 IU/L (ref 0–32)
AST SERPL-CCNC: 20 IU/L (ref 0–40)
BASOPHILS # BLD AUTO: 0 X10E3/UL (ref 0–0.2)
BASOPHILS NFR BLD AUTO: 0 %
BILIRUB SERPL-MCNC: 0.3 MG/DL (ref 0–1.2)
BUN SERPL-MCNC: 24 MG/DL (ref 8–27)
BUN/CREAT SERPL: 13 (ref 12–28)
CALCIUM SERPL-MCNC: 9.2 MG/DL (ref 8.7–10.3)
CHLORIDE SERPL-SCNC: 105 MMOL/L (ref 96–106)
CHOLEST SERPL-MCNC: 91 MG/DL (ref 100–199)
CO2 SERPL-SCNC: 20 MMOL/L (ref 20–29)
CREAT SERPL-MCNC: 1.8 MG/DL (ref 0.57–1)
EGFRCR SERPLBLD CKD-EPI 2021: 30 ML/MIN/1.73
EOSINOPHIL # BLD AUTO: 0.1 X10E3/UL (ref 0–0.4)
EOSINOPHIL NFR BLD AUTO: 1 %
ERYTHROCYTE [DISTWIDTH] IN BLOOD BY AUTOMATED COUNT: 14.3 % (ref 11.7–15.4)
FERRITIN SERPL-MCNC: 69 NG/ML (ref 15–150)
FOLATE SERPL-MCNC: 11.1 NG/ML
GLOBULIN SER CALC-MCNC: 2.7 G/DL (ref 1.5–4.5)
GLUCOSE SERPL-MCNC: 90 MG/DL (ref 70–99)
HCT VFR BLD AUTO: 36.8 % (ref 34–46.6)
HDLC SERPL-MCNC: 44 MG/DL
HGB BLD-MCNC: 11.6 G/DL (ref 11.1–15.9)
IMM GRANULOCYTES # BLD AUTO: 0 X10E3/UL (ref 0–0.1)
IMM GRANULOCYTES NFR BLD AUTO: 0 %
IRON SATN MFR SERPL: 24 % (ref 15–55)
IRON SERPL-MCNC: 68 UG/DL (ref 27–139)
LDLC SERPL CALC-MCNC: 31 MG/DL (ref 0–99)
LYMPHOCYTES # BLD AUTO: 1.3 X10E3/UL (ref 0.7–3.1)
LYMPHOCYTES NFR BLD AUTO: 18 %
MCH RBC QN AUTO: 29.9 PG (ref 26.6–33)
MCHC RBC AUTO-ENTMCNC: 31.5 G/DL (ref 31.5–35.7)
MCV RBC AUTO: 95 FL (ref 79–97)
MONOCYTES # BLD AUTO: 0.4 X10E3/UL (ref 0.1–0.9)
MONOCYTES NFR BLD AUTO: 5 %
NEUTROPHILS # BLD AUTO: 5.2 X10E3/UL (ref 1.4–7)
NEUTROPHILS NFR BLD AUTO: 76 %
PLATELET # BLD AUTO: 236 X10E3/UL (ref 150–450)
POTASSIUM SERPL-SCNC: 4.8 MMOL/L (ref 3.5–5.2)
PROT SERPL-MCNC: 7 G/DL (ref 6–8.5)
RBC # BLD AUTO: 3.88 X10E6/UL (ref 3.77–5.28)
SODIUM SERPL-SCNC: 140 MMOL/L (ref 134–144)
TIBC SERPL-MCNC: 279 UG/DL (ref 250–450)
TRIGL SERPL-MCNC: 74 MG/DL (ref 0–149)
UIBC SERPL-MCNC: 211 UG/DL (ref 118–369)
VIT B12 SERPL-MCNC: 1927 PG/ML (ref 232–1245)
VLDLC SERPL CALC-MCNC: 16 MG/DL (ref 5–40)
WBC # BLD AUTO: 7 X10E3/UL (ref 3.4–10.8)

## 2024-11-08 NOTE — PROGRESS NOTES
Vitamin B12 elevated.  Please stop any B12 supplements.  We will continue to monitor and can restart at a lower dose as level drops.  Kidney function lower than previous.  EGFR down to 30.  Anemia has improved.  Follow-up with your kidney doctor, make sure you are staying well-hydrated.  I recommend recheck kidney function again in 1 month.

## 2024-11-14 NOTE — PROGRESS NOTES
[facility] Cardiothoracic Surgery Office Follow Up Note     Date of Encounter: 2024     Name: Orin Munoz  : 1955     Referred By: No ref. provider found  PCP: Yomaira Saldana PA    Chief Complaint:    Chief Complaint   Patient presents with    Peripheral Vascular Disease     4 week follow up for wound check s/p EVAR and fem-fem bypass 10/10/24.       Subjective      History of Present Illness:    Orin Munoz is a 69 y.o. female former smoker with history of lung nodules, COPD, HTN, HLD on statin therapy, CKD III, renal artery stenosis, carotid disease, CAD, PAD with left iliac artery occlusion, and AAA s/p EVAR with uniiliac configuration and extension limb with right femoral to left femoral artery bypass with 8mm PTFE graft via bilateral femoral artery cutdown and endarterectomy of the right and left common femoral artery on 10/10/2024 with Dr Villarreal. Pt had uneventful post-operative course and was discharged on POD # 3 with no readmissions.  She was seen 10/21 as a short interval 1 week follow-up for staple removal with no groin site healing concerns, compliant with wound care. Staples were removed without complication and she presents today for wound check. She has had complete healing of her wounds and has done very well. She reports near total resolution of her preoperative left leg and lower back pain but does endorse some left inner thigh sensitivity.     Review of Systems:  Review of Systems   Constitutional: Negative. Negative for chills, decreased appetite, diaphoresis, fever, malaise/fatigue, night sweats, weight gain and weight loss.   HENT:  Negative for hoarse voice.    Eyes: Negative.  Negative for blurred vision, double vision and visual disturbance.   Cardiovascular:  Positive for dyspnea on exertion and leg swelling (left foot). Negative for chest pain, claudication, irregular heartbeat, near-syncope, orthopnea, palpitations, paroxysmal nocturnal dyspnea and  syncope.   Respiratory:  Positive for cough. Negative for hemoptysis, shortness of breath, sputum production and wheezing.    Hematologic/Lymphatic: Negative for adenopathy and bleeding problem. Bruises/bleeds easily.   Skin:  Positive for poor wound healing. Negative for color change, nail changes and rash.   Musculoskeletal:  Positive for back pain and joint pain. Negative for falls and muscle cramps.   Gastrointestinal: Negative.  Negative for abdominal pain, dysphagia and heartburn.   Genitourinary: Negative.  Negative for flank pain.   Neurological:  Positive for loss of balance. Negative for brief paralysis, disturbances in coordination, dizziness, focal weakness, light-headedness, numbness, paresthesias, sensory change, vertigo and weakness.   Psychiatric/Behavioral:  Negative for depression and suicidal ideas. The patient is nervous/anxious.    Allergic/Immunologic: Negative.  Negative for persistent infections.       I have reviewed the following portions of the patient's history: problem list, current medications, allergies, past surgical history, past medical history, past social history, past family history, and ROS and confirm it's accurate.    Allergies:  Allergies   Allergen Reactions    Penicillins Hives    Metaproterenol Other (See Comments)     Pt did not recognize this med     Ipratropium Bromide Anxiety    Metoprolol Hives    Other Hives     Alupent        Medications:      Current Outpatient Medications:     albuterol sulfate  (90 Base) MCG/ACT inhaler, Inhale 2 puffs Every 4 (Four) Hours As Needed for Wheezing., Disp: 10.8 g, Rfl: 1    allopurinol (ZYLOPRIM) 300 MG tablet, TAKE 1 TABLET BY MOUTH DAILY, Disp: 90 tablet, Rfl: 1    amLODIPine (NORVASC) 10 MG tablet, Take 1 tablet by mouth Daily., Disp: 90 tablet, Rfl: 1    aspirin 81 MG EC tablet, Take 1 tablet by mouth Daily., Disp: , Rfl:     atorvastatin (LIPITOR) 80 MG tablet, Take 1 tablet by mouth Daily., Disp: 90 tablet, Rfl: 3     clopidogrel (PLAVIX) 75 MG tablet, Take 1 tablet by mouth Daily., Disp: 90 tablet, Rfl: 1    escitalopram (LEXAPRO) 10 MG tablet, Take 1 tablet by mouth Daily., Disp: 90 tablet, Rfl: 1    Evolocumab (REPATHA) solution auto-injector SureClick injection, Inject 1 mL under the skin into the appropriate area as directed Every 14 (Fourteen) Days., Disp: 6 mL, Rfl: 4    ibandronate (BONIVA) 150 MG tablet, Take 1 tablet by mouth Every 30 (Thirty) Days., Disp: 3 tablet, Rfl: 4    icosapent ethyl (Vascepa) 1 g capsule capsule, Take 2 g by mouth 2 (Two) Times a Day With Meals., Disp: 120 capsule, Rfl: 11    lisinopril (PRINIVIL,ZESTRIL) 30 MG tablet, Take 1 tablet by mouth Every 12 (Twelve) Hours., Disp: 180 tablet, Rfl: 3    nebivolol (BYSTOLIC) 5 MG tablet, Take 1 tablet by mouth Daily., Disp: 90 tablet, Rfl: 1    tiotropium bromide-olodaterol (Stiolto Respimat) 2.5-2.5 MCG/ACT aerosol solution inhaler, Inhale 2 puffs Daily., Disp: 12 g, Rfl: 1    History:   Past Medical History:   Diagnosis Date    Alcoholism     Allergic 2023    Penicillin,alupent    Anemia 11/7/2024    Ankle sprain     N/A    Arthritis 15 years ago    Arthritis of back Yrs.ago    N/A    Arthritis of neck Last year    Asthma 20 years ago    Cataract 2021    Chronic pain disorder     COPD (chronic obstructive pulmonary disease) 2015    Coronary artery disease 2018    Acute pulmonary hemmorage of the abdomen    Fracture, foot 2018    Had surgery    Hip arthrosis Years ago    Hyperlipidemia 2015    Hypertension 2000    Knee sprain 2023    Knee swelling A long time    Low back pain N/A    Low back strain Last few years    Was told i had osteo    Osteopenia 2018    Osteoporosis     PAD (peripheral artery disease)     Periarthritis of shoulder Last year    Peripheral neuropathy     Pneumonia 2022    Hospitalized for 4 days.    Renal insufficiency 2022    Scoliosis Last year    Shingles 2009    Visual impairment 1958    Wears eyeglasses     Wrist sprain 2018        Past Surgical History:   Procedure Laterality Date    ABDOMINAL AORTIC ANEURYSM REPAIR WITH ENDOGRAFT N/A 10/10/2024    Procedure: ABDOMINAL AORTIC ANEURYSM REPAIR WITH ENDOGRAFT, BILATERAL FEMORAL CUTDOWNS, AORTAGRAM, ENDOVASCULAR REPAIR, INFRARENAL ABDOMINAL AORTIC ANEURYSM, RIGHT ILLIAC STENT, RIGHT AND LEFT FEMORAL ENDARTECTOMY;  Surgeon: Aaron Villarreal MD;  Location:  [facility] OR;  Service: Vascular;  Laterality: N/A;  DOSE 233 MGY  FT 4 MIN 30 SEC  CONTRAST 50 ML    ANGIOPLASTY RENAL ARTERY Left 10/10/2024    Procedure: RENAL ARTERY STENT;  Surgeon: Aaron Villarreal MD;  Location:  [facility] OR;  Service: Vascular;  Laterality: Left;    APPENDECTOMY  1971    CHOLECYSTECTOMY      COLONOSCOPY  2016    EYE SURGERY Right     Cataract    FEMORAL FEMORAL BYPASS N/A 10/10/2024    Procedure: FEMORAL FEMORAL BYPASS WITH PTFE GRAFT;  Surgeon: Aaron Villarreal MD;  Location:  [facility] OR;  Service: Vascular;  Laterality: N/A;    FOOT SURGERY Bilateral      done surgery debridement    HYSTERECTOMY  18 years old       Social History     Socioeconomic History    Marital status:    Tobacco Use    Smoking status: Former     Current packs/day: 0.00     Average packs/day: 1 pack/day for 30.3 years (30.3 ttl pk-yrs)     Types: Cigarettes     Start date: 1988     Quit date: 2018     Years since quittin.5    Smokeless tobacco: Never   Vaping Use    Vaping status: Never Used   Substance and Sexual Activity    Alcohol use: Yes     Alcohol/week: 3.0 standard drinks of alcohol     Types: 3 Shots of liquor per week     Comment: 1 drink every week typically    Drug use: Not Currently     Types: Marijuana     Comment: quit 2024    Sexual activity: Not Currently     Partners: Male     Birth control/protection: Condom, Birth control pill, Hysterectomy        Family History   Problem Relation Age of Onset    Arthritis Mother     COPD Mother     Heart disease Mother      "Hyperlipidemia Mother     Diabetes Maternal Grandmother     Osteoporosis Maternal Grandmother         In lower back    Alcohol abuse Brother         Passed away in 2021    Drug abuse Brother     Cancer Brother         Retnal cancer    Early death Brother         Retinal cancer    Heart disease Sister        Objective   Physical Exam:  Vitals:    11/18/24 0917   BP: 154/81   BP Location: Right arm   Patient Position: Sitting   Pulse: 64   Temp: 97.1 °F (36.2 °C)   SpO2: 95%   Weight: 65.8 kg (145 lb)   Height: 162.6 cm (64\")      Body mass index is 24.89 kg/m².    Physical Exam  Vitals and nursing note reviewed.   Constitutional:       Appearance: Normal appearance.   Cardiovascular:      Rate and Rhythm: Normal rate and regular rhythm.      Pulses: No decreased pulses.           Femoral pulses are 2+ on the right side and 2+ on the left side.       Dorsalis pedis pulses are 1+ on the right side and 1+ on the left side.        Posterior tibial pulses are 1+ on the right side and 1+ on the left side.      Heart sounds: Normal heart sounds, S1 normal and S2 normal. No murmur heard.  Pulmonary:      Effort: Pulmonary effort is normal.      Breath sounds: Normal breath sounds.   Abdominal:      Palpations: Abdomen is soft.   Musculoskeletal:         General: Normal range of motion.      Right lower leg: No edema.      Left lower leg: No edema.   Feet:      Right foot:      Skin integrity: Skin integrity normal. No ulcer, skin breakdown, callus or dry skin.      Left foot:      Skin integrity: Skin integrity normal. No ulcer, skin breakdown, callus or dry skin.      Comments: Toes: pink, warm, dry without delayed cap refill   Skin:     General: Skin is warm and dry.      Capillary Refill: Capillary refill takes less than 2 seconds.      Comments: Bilateral femoral incision: beautifully healed with wound edges well approximated, no surround erythema, edema, warmth, drainage or hematoma.    Neurological:      General: No " focal deficit present.      Mental Status: She is alert and oriented to person, place, and time. Mental status is at baseline.   Psychiatric:         Mood and Affect: Mood normal.         Behavior: Behavior normal.         Imaging/Labs:  CT Angio Abdominal Aorta Bilateral Iliofem Runoff (08/18/2024 11:26)   1. Infrarenal abdominal aortic aneurysm measuring 4.4 x 4.1 cm with moderate amount of mural thrombus.  2. Occluded left common iliac artery stent with a patent right common iliac artery stent.  3. Multifocal stenosis, left greater than right of the superficial femoral arteries, likely hemodynamically significant on the left  4. Three-vessel runoff to the bilateral lower extremities  5. Multifocal stenosis of the left renal artery with left renal atrophy.   Electronically Signed: Braden Mantilla MD     Assessment / Plan      Assessment / Plan:  Diagnoses and all orders for this visit:    1. S/P aneurysm repair (Primary)    2. S/P femoral-femoral bypass surgery       left iliac artery occlusion and AAA   s/p EVAR with uniiliac configuration and extension limb with right femoral to left femoral artery bypass with 8mm PTFE graft via bilateral femoral artery cutdown and endarterectomy of the right and left common femoral artery on 10/10/2024 with Dr Villarreal.  uneventful post-operative course, no readmissions  1 week wound check 10/21 - no groin site healing concerns and compliant with wound care. Staples removed without complication. Pt instructed to continue wound care with antibacterial soap washes and avoidance of 90 degree sitting   Wound check today with complete healing of her incision   resolution of her preoperative left leg pain but does endorse some left inner thigh sensitivity - consistent with post-op femoral nerve irritation that should continue to improve in the coming weeks  On exam she has beautifully healed femoral incisions; palpable femoral and pedal pulses with toes pink, warm, dry, without delayed  cap refill.  Compliant with DAPT and statin therapy; no longer smoking - continue medical management   Will require post-op imaging at 6-months per Dr Villarreal's guidelines    Follow Up:   Return in about 5 months (around 4/18/2025) for Imaging: CTA with runoff.   Or sooner for any further concerns or worsening sign and symptoms. If unable to reach us in the office please dial 911 or go to the nearest emergency department.      PHOENIX Forbes  [facility] Cardiothoracic Surgery

## 2024-11-18 ENCOUNTER — OFFICE VISIT (OUTPATIENT)
Dept: CARDIAC SURGERY | Facility: CLINIC | Age: 69
End: 2024-11-18
Payer: MEDICARE

## 2024-11-18 VITALS
BODY MASS INDEX: 24.75 KG/M2 | HEART RATE: 64 BPM | WEIGHT: 145 LBS | DIASTOLIC BLOOD PRESSURE: 81 MMHG | HEIGHT: 64 IN | SYSTOLIC BLOOD PRESSURE: 154 MMHG | TEMPERATURE: 97.1 F | OXYGEN SATURATION: 95 %

## 2024-11-18 DIAGNOSIS — Z95.828 S/P FEMORAL-FEMORAL BYPASS SURGERY: ICD-10-CM

## 2024-11-18 DIAGNOSIS — Z98.890 S/P ANEURYSM REPAIR: Primary | ICD-10-CM

## 2024-11-18 DIAGNOSIS — Z86.79 S/P ANEURYSM REPAIR: Primary | ICD-10-CM

## 2024-11-18 PROCEDURE — 3079F DIAST BP 80-89 MM HG: CPT | Performed by: NURSE PRACTITIONER

## 2024-11-18 PROCEDURE — 99024 POSTOP FOLLOW-UP VISIT: CPT | Performed by: NURSE PRACTITIONER

## 2024-11-18 PROCEDURE — 1160F RVW MEDS BY RX/DR IN RCRD: CPT | Performed by: NURSE PRACTITIONER

## 2024-11-18 PROCEDURE — 1159F MED LIST DOCD IN RCRD: CPT | Performed by: NURSE PRACTITIONER

## 2024-11-18 PROCEDURE — 3077F SYST BP >= 140 MM HG: CPT | Performed by: NURSE PRACTITIONER

## 2025-02-10 DIAGNOSIS — J41.0 SIMPLE CHRONIC BRONCHITIS: ICD-10-CM

## 2025-02-11 RX ORDER — TIOTROPIUM BROMIDE INHALATION SPRAY 3.12 UG/1
2 SPRAY, METERED RESPIRATORY (INHALATION) DAILY
Qty: 12 G | Refills: 1 | OUTPATIENT
Start: 2025-02-11

## 2025-03-14 ENCOUNTER — OFFICE VISIT (OUTPATIENT)
Dept: CARDIOLOGY | Facility: CLINIC | Age: 70
End: 2025-03-14
Payer: MEDICARE

## 2025-03-14 VITALS
SYSTOLIC BLOOD PRESSURE: 142 MMHG | RESPIRATION RATE: 18 BRPM | BODY MASS INDEX: 25.1 KG/M2 | DIASTOLIC BLOOD PRESSURE: 82 MMHG | WEIGHT: 147 LBS | HEART RATE: 76 BPM | HEIGHT: 64 IN | OXYGEN SATURATION: 92 %

## 2025-03-14 DIAGNOSIS — Z98.890 PERIPHERAL ARTERIAL DISEASE WITH HISTORY OF REVASCULARIZATION: ICD-10-CM

## 2025-03-14 DIAGNOSIS — E78.2 MIXED HYPERLIPIDEMIA: ICD-10-CM

## 2025-03-14 DIAGNOSIS — R01.1 HEART MURMUR: Primary | ICD-10-CM

## 2025-03-14 DIAGNOSIS — I10 PRIMARY HYPERTENSION: ICD-10-CM

## 2025-03-14 DIAGNOSIS — I73.9 PERIPHERAL ARTERIAL DISEASE WITH HISTORY OF REVASCULARIZATION: ICD-10-CM

## 2025-03-14 DIAGNOSIS — I25.10 CORONARY ARTERY CALCIFICATION SEEN ON CT SCAN: ICD-10-CM

## 2025-03-14 DIAGNOSIS — I71.60 THORACOABDOMINAL AORTIC ANEURYSM (TAAA) WITHOUT RUPTURE, UNSPECIFIED PART: ICD-10-CM

## 2025-03-14 PROCEDURE — 99214 OFFICE O/P EST MOD 30 MIN: CPT | Performed by: INTERNAL MEDICINE

## 2025-03-14 PROCEDURE — 3077F SYST BP >= 140 MM HG: CPT | Performed by: INTERNAL MEDICINE

## 2025-03-14 PROCEDURE — 36415 COLL VENOUS BLD VENIPUNCTURE: CPT | Performed by: INTERNAL MEDICINE

## 2025-03-14 PROCEDURE — 3079F DIAST BP 80-89 MM HG: CPT | Performed by: INTERNAL MEDICINE

## 2025-03-14 RX ORDER — NEBIVOLOL 10 MG/1
10 TABLET ORAL DAILY
Qty: 90 TABLET | Refills: 2 | Status: SHIPPED | OUTPATIENT
Start: 2025-03-14

## 2025-03-14 NOTE — PROGRESS NOTES
[facility] [place]  [facility] CARDIOLOGY  [address] [place] [address]  Dept: [phone]  Dept Fax: [phone]    Date: 03/14/2025  Patient: Orin Munoz  YOB: 1955    Follow Up Office Visit Note    Interval Follow-up  Ms. Orin Munoz is a 69 y.o. female who is here for follow-up on Hypertension.    Subjective   Patient overall doing well with no acute complaints.  Patient denies angina, orthopnea, PND, palpitations, lightheadedness, syncope or medications side-effects.  Interval vascular surgery seen prior bilateral femoral artery cutdowns, EVAR of infrarenal abdominal aortic aneurysm with [place] excluder device 26 x 14 x 12, right femoral artery to left femoral artery bypass with 8 mm PTFE ringed graft, endarterectomy of the right and left common femoral arteries.    The following portions of the patient's history were reviewed and updated as appropriate: allergies, current medications, past family history, past medical history, past social history, past surgical history, and problem list.    Medications:   Allergies   Allergen Reactions    Penicillins Hives    Metaproterenol Other (See Comments)     Pt did not recognize this med     Ipratropium Bromide Anxiety    Metoprolol Hives    Other Hives     Alupent       Current Outpatient Medications   Medication Instructions    albuterol sulfate  (90 Base) MCG/ACT inhaler 2 puffs, Inhalation, Every 4 Hours PRN    allopurinol (ZYLOPRIM) 300 mg, Oral, Daily    amLODIPine (NORVASC) 10 mg, Oral, Daily    aspirin 81 mg, Daily    atorvastatin (LIPITOR) 80 mg, Oral, Daily    clopidogrel (PLAVIX) 75 mg, Oral, Daily    escitalopram (LEXAPRO) 10 mg, Oral, Daily    Evolocumab (REPATHA) 140 mg, Subcutaneous, Every 14 Days    ibandronate (BONIVA) 150 mg, Oral, Every 30 Days    icosapent ethyl (VASCEPA) 2 g, Oral, 2 Times Daily With Meals    lisinopril (PRINIVIL,ZESTRIL) 30 mg, Oral, Every 12 Hours Scheduled     "nebivolol (BYSTOLIC) 10 mg, Oral, Daily       Tobacco Use: Medium Risk (3/14/2025)    Patient History     Smoking Tobacco Use: Former     Smokeless Tobacco Use: Never     Passive Exposure: Not on file        Objective  Vitals:    03/14/25 1012   BP: 142/82   Pulse: 76   Resp: 18   SpO2: 92%   Weight: 66.7 kg (147 lb)   Height: 162.6 cm (64\")   PainSc: 0-No pain      Vitals:    03/14/25 1012   BP: 142/82   Pulse: 76   Resp: 18   SpO2: 92%   Weight: 66.7 kg (147 lb)   Height: 162.6 cm (64\")          Physical Exam  Constitutional:       Appearance: Not in distress.   Neck:      Vascular: JVD normal.   Pulmonary:      Breath sounds: Normal breath sounds.   Cardiovascular:      Normal rate. Regular rhythm.      Murmurs: There is a grade 1/6 harsh midsystolic murmur at the URSB, radiating to the neck.   Pulses:     Intact distal pulses.   Edema:     Peripheral edema absent.   Neurological:      Mental Status: Alert.              Diagnostic Data  Lab Results   Component Value Date     03/14/2025    K 5.2 03/14/2025     03/14/2025    CO2 21 03/14/2025    BUN 21 03/14/2025    CREATININE 1.08 (H) 03/14/2025    CALCIUM 9.5 03/14/2025    BILITOT 0.5 03/14/2025    ALKPHOS 94 03/14/2025    ALT 46 (H) 03/14/2025    AST 72 (H) 03/14/2025    GLUCOSE 91 03/14/2025    ALBUMIN 4.5 03/14/2025     Lab Results   Component Value Date    WBC 7.0 11/07/2024    HGB 11.6 11/07/2024    HCT 36.8 11/07/2024     11/07/2024     Lab Results   Component Value Date    INR 0.97 10/09/2024    PTT 27.7 10/09/2024     Lab Results   Component Value Date    CKTOTAL 99 12/08/2023    CKTOTAL 82 07/28/2023     No results found for: \"BNP\", \"PROBNP\"    Lab Results   Component Value Date    CHLPL 91 (L) 11/07/2024    TRIG 74 11/07/2024    HDL 44 11/07/2024    LDL 31 11/07/2024     Lab Results   Component Value Date    TSH 1.500 07/28/2023    FREET4 1.17 07/28/2023       CV Diagnostics:  Procedures  CXR: Results for orders placed during the " hospital encounter of 10/09/24    XR Chest PA & Lateral    Narrative  XR CHEST PA AND LATERAL    Date of Exam: 10/9/2024 1:39 PM EDT    Indication: Pre-Op Cardiac Surgery    Comparison: 1/16/2024    Findings:  Lung fields are moderately hyperinflated. There is biapical pleural-parenchymal scarring. There is prominence of central pulmonary arteries. Heart size is normal. There are no acute infiltrates or effusions.    Impression  Impression:  Emphysematous changes. No acute process.      Electronically Signed: Comfort Encinas MD  10/10/2024 2:20 PM EDT  Workstation ID: [id number]     ECHO/MUGA: No results found for this or any previous visit.     STRESS TESTS: Results for orders placed in visit on 05/30/24    Stress Test With Myocardial Perfusion One Day    Interpretation Summary    Impressions are consistent with a normal/low risk Lexiscan nuclear study.    Myocardial perfusion imaging indicates a normal myocardial perfusion study with no evidence of ischemia.    Left ventricular ejection fraction is normal (Calculated EF = 65%).    Breast attenuation artifact is present.     CARDIAC CATH: No results found for this or any previous visit.     DEVICES: No valid procedures specified.   HOLTER: No results found for this or any previous visit.     CT/MRI:  No results found for this or any previous visit.    VASCULAR: No valid procedures specified.     Assessment and Plan  Diagnoses and all orders for this visit:    1. Heart murmur (Primary)  Assessment & Plan:  [TTE 05/28/2024]       2. Primary hypertension  -     Comprehensive Metabolic Panel  -     nebivolol (BYSTOLIC) 10 MG tablet; Take 1 tablet by mouth Daily.  Dispense: 90 tablet; Refill: 2    3. Mixed hyperlipidemia    4. Peripheral arterial disease with history of revascularization  -     Doppler Arterial Multi Level Lower Extremity - Bilateral CAR; Future    5. Coronary artery calcification seen on CT scan    6. Thoracoabdominal aortic aneurysm (TAAA) without  rupture, unspecified part         Return in about 3 months (around 6/14/2025) for Follow-up with Dr Rollins.    There are no Patient Instructions on file for this visit.    Cornelio Rollins MD

## 2025-03-14 NOTE — PROGRESS NOTES
Venipuncture Blood Specimen Collection  Venipuncture performed in clinic by Nguyen Panda RN with good hemostasis. Patient tolerated the procedure well without complications.   03/14/25   Nguyen Panda RN

## 2025-03-15 LAB
ALBUMIN SERPL-MCNC: 4.5 G/DL (ref 3.8–4.8)
ALP SERPL-CCNC: 94 IU/L (ref 44–121)
ALT SERPL-CCNC: 46 IU/L (ref 0–32)
AST SERPL-CCNC: 72 IU/L (ref 0–40)
BILIRUB SERPL-MCNC: 0.5 MG/DL (ref 0–1.2)
BUN SERPL-MCNC: 21 MG/DL (ref 8–27)
BUN/CREAT SERPL: 19 (ref 12–28)
CALCIUM SERPL-MCNC: 9.5 MG/DL (ref 8.7–10.3)
CHLORIDE SERPL-SCNC: 101 MMOL/L (ref 96–106)
CO2 SERPL-SCNC: 21 MMOL/L (ref 20–29)
CREAT SERPL-MCNC: 1.08 MG/DL (ref 0.57–1)
EGFRCR SERPLBLD CKD-EPI 2021: 55 ML/MIN/1.73
GLOBULIN SER CALC-MCNC: 2.8 G/DL (ref 1.5–4.5)
GLUCOSE SERPL-MCNC: 91 MG/DL (ref 70–99)
POTASSIUM SERPL-SCNC: 5.2 MMOL/L (ref 3.5–5.2)
PROT SERPL-MCNC: 7.3 G/DL (ref 6–8.5)
SODIUM SERPL-SCNC: 139 MMOL/L (ref 134–144)

## 2025-03-16 ENCOUNTER — RESULTS FOLLOW-UP (OUTPATIENT)
Dept: CARDIOLOGY | Facility: CLINIC | Age: 70
End: 2025-03-16
Payer: MEDICARE

## 2025-03-16 DIAGNOSIS — E78.5 HYPERLIPIDEMIA, UNSPECIFIED HYPERLIPIDEMIA TYPE: ICD-10-CM

## 2025-03-16 DIAGNOSIS — I73.9 PVD (PERIPHERAL VASCULAR DISEASE) WITH CLAUDICATION: Primary | ICD-10-CM

## 2025-03-16 RX ORDER — ATORVASTATIN CALCIUM 80 MG/1
40 TABLET, FILM COATED ORAL DAILY
Qty: 90 TABLET | Refills: 3 | Status: SHIPPED | OUTPATIENT
Start: 2025-03-16 | End: 2025-03-19 | Stop reason: SDUPTHER

## 2025-03-17 ENCOUNTER — TELEPHONE (OUTPATIENT)
Dept: CARDIOLOGY | Facility: CLINIC | Age: 70
End: 2025-03-17

## 2025-03-17 NOTE — TELEPHONE ENCOUNTER
Name: Orin Munoz      Relationship: Self      Best Callback Number: [phone]      HUB PROVIDED THE RELAY MESSAGE FROM THE OFFICE      PATIENT: VOICED UNDERSTANDING AND HAS NO FURTHER QUESTIONS AT THIS TIME    ADDITIONAL INFORMATION:

## 2025-03-17 NOTE — TELEPHONE ENCOUNTER
Call placed to patient to discuss blood work results. No answer, left voicemail requesting call back.   OK for Hub to relay-Please inform patient that her Blood work was abnormal.  Kideney test improving, but liver tests abnormal.  Please ask patient to decrease atorvastatin 80 mg to 1/2 tab daily.   Thank you!

## 2025-03-18 DIAGNOSIS — E78.5 HYPERLIPIDEMIA, UNSPECIFIED HYPERLIPIDEMIA TYPE: ICD-10-CM

## 2025-03-18 RX ORDER — ATORVASTATIN CALCIUM 80 MG/1
40 TABLET, FILM COATED ORAL DAILY
Qty: 90 TABLET | Refills: 3 | Status: CANCELLED | OUTPATIENT
Start: 2025-03-18

## 2025-03-19 DIAGNOSIS — E78.5 HYPERLIPIDEMIA, UNSPECIFIED HYPERLIPIDEMIA TYPE: ICD-10-CM

## 2025-03-19 DIAGNOSIS — I71.60 THORACOABDOMINAL AORTIC ANEURYSM (TAAA) WITHOUT RUPTURE, UNSPECIFIED PART: Primary | ICD-10-CM

## 2025-03-19 DIAGNOSIS — Z95.828 S/P FEMORAL-FEMORAL BYPASS SURGERY: ICD-10-CM

## 2025-03-19 DIAGNOSIS — I70.213 ATHEROSCLEROSIS OF NATIVE ARTERY OF BOTH LOWER EXTREMITIES WITH INTERMITTENT CLAUDICATION: ICD-10-CM

## 2025-03-19 DIAGNOSIS — Z86.79 S/P ANEURYSM REPAIR: ICD-10-CM

## 2025-03-19 DIAGNOSIS — Z98.890 S/P ANEURYSM REPAIR: ICD-10-CM

## 2025-03-19 DIAGNOSIS — I71.40 ABDOMINAL AORTIC ANEURYSM (AAA) WITHOUT RUPTURE, UNSPECIFIED PART: ICD-10-CM

## 2025-03-19 RX ORDER — ATORVASTATIN CALCIUM 80 MG/1
40 TABLET, FILM COATED ORAL DAILY
Qty: 90 TABLET | Refills: 3 | Status: SHIPPED | OUTPATIENT
Start: 2025-03-19

## 2025-03-24 NOTE — TELEPHONE ENCOUNTER
Patient notified of patti results, patient verbalized understanding.   Patient agreeable to vascular surgery referral

## 2025-04-17 DIAGNOSIS — E79.0 HYPERURICEMIA: ICD-10-CM

## 2025-04-18 RX ORDER — ALLOPURINOL 300 MG/1
300 TABLET ORAL DAILY
Qty: 90 TABLET | Refills: 0 | Status: SHIPPED | OUTPATIENT
Start: 2025-04-18

## 2025-05-02 ENCOUNTER — TELEPHONE (OUTPATIENT)
Dept: CARDIAC SURGERY | Facility: CLINIC | Age: 70
End: 2025-05-02
Payer: MEDICARE

## 2025-05-02 NOTE — TELEPHONE ENCOUNTER
I spoke with patient regarding incidental finding suspicious for pneumonia on CTA abdominal aorta with runoff.   Patient cancelled her follow-up appt with Debbie on 4/24/25 due to her difficulty travelling to [place]. She has been having shortness of breath for a while now and has been prescribed an inhaler. Denied cough, fever, chest pains, or wheezing.   I discussed with patient the importance of following up with PCP regarding these findings. She verbalized understanding and said she would contact PCP for sooner appt.

## 2025-05-12 RX ORDER — CLOPIDOGREL BISULFATE 75 MG/1
75 TABLET ORAL DAILY
Qty: 90 TABLET | Refills: 0 | Status: SHIPPED | OUTPATIENT
Start: 2025-05-12

## 2025-05-20 ENCOUNTER — OUTSIDE FACILITY SERVICE (OUTPATIENT)
Dept: CARDIOLOGY | Facility: CLINIC | Age: 70
End: 2025-05-20
Payer: MEDICARE

## 2025-05-20 PROCEDURE — 99222 1ST HOSP IP/OBS MODERATE 55: CPT | Performed by: INTERNAL MEDICINE

## 2025-05-21 ENCOUNTER — OUTSIDE FACILITY SERVICE (OUTPATIENT)
Dept: CARDIOLOGY | Facility: CLINIC | Age: 70
End: 2025-05-21
Payer: MEDICARE

## 2025-05-21 PROCEDURE — 99232 SBSQ HOSP IP/OBS MODERATE 35: CPT | Performed by: INTERNAL MEDICINE

## 2025-05-21 PROCEDURE — 93306 TTE W/DOPPLER COMPLETE: CPT | Performed by: INTERNAL MEDICINE

## 2025-05-22 ENCOUNTER — OUTSIDE FACILITY SERVICE (OUTPATIENT)
Dept: CARDIOLOGY | Facility: CLINIC | Age: 70
End: 2025-05-22
Payer: MEDICARE

## 2025-05-22 PROCEDURE — 99232 SBSQ HOSP IP/OBS MODERATE 35: CPT | Performed by: INTERNAL MEDICINE

## 2025-05-23 ENCOUNTER — OUTSIDE FACILITY SERVICE (OUTPATIENT)
Dept: CARDIOLOGY | Facility: CLINIC | Age: 70
End: 2025-05-23
Payer: MEDICARE

## 2025-05-23 PROCEDURE — 99232 SBSQ HOSP IP/OBS MODERATE 35: CPT | Performed by: INTERNAL MEDICINE

## 2025-05-27 ENCOUNTER — OUTSIDE FACILITY SERVICE (OUTPATIENT)
Dept: CARDIOLOGY | Facility: CLINIC | Age: 70
End: 2025-05-27
Payer: MEDICARE

## 2025-05-27 PROCEDURE — 99232 SBSQ HOSP IP/OBS MODERATE 35: CPT | Performed by: INTERNAL MEDICINE

## 2025-05-28 ENCOUNTER — OUTSIDE FACILITY SERVICE (OUTPATIENT)
Dept: CARDIOLOGY | Facility: CLINIC | Age: 70
End: 2025-05-28
Payer: MEDICARE

## 2025-05-28 PROCEDURE — 99232 SBSQ HOSP IP/OBS MODERATE 35: CPT | Performed by: INTERNAL MEDICINE

## 2025-05-29 ENCOUNTER — OUTSIDE FACILITY SERVICE (OUTPATIENT)
Dept: CARDIOLOGY | Facility: CLINIC | Age: 70
End: 2025-05-29
Payer: MEDICARE

## 2025-05-29 PROCEDURE — 99232 SBSQ HOSP IP/OBS MODERATE 35: CPT | Performed by: INTERNAL MEDICINE

## 2025-05-30 ENCOUNTER — OUTSIDE FACILITY SERVICE (OUTPATIENT)
Dept: CARDIOLOGY | Facility: CLINIC | Age: 70
End: 2025-05-30
Payer: MEDICARE

## 2025-05-30 PROCEDURE — 99232 SBSQ HOSP IP/OBS MODERATE 35: CPT | Performed by: INTERNAL MEDICINE

## 2025-06-22 ENCOUNTER — READMISSION MANAGEMENT (OUTPATIENT)
Dept: CALL CENTER | Facility: HOSPITAL | Age: 70
End: 2025-06-22
Payer: MEDICARE

## 2025-06-22 NOTE — OUTREACH NOTE
Prep Survey      Flowsheet Row Responses   [facility] facility patient discharged from? Non-BH   Is LACE score < 7 ? Non-BH Discharge   Eligibility [facility]   Date of Admission 05/31/25   Date of Discharge 06/21/25   Discharge Disposition Home or Self Care   Discharge diagnosis Acute respiratory failure, unspecified whether with hypoxia or hypercapnia   Acute respiratory failure (HCC)   Does the patient have one of the following disease processes/diagnoses(primary or secondary)? Other   Does the patient have Home health ordered? No   Is there a DME ordered? No   Prep survey completed? Yes            DUKE GROVE - Registered Nurse

## 2025-06-23 ENCOUNTER — TRANSITIONAL CARE MANAGEMENT TELEPHONE ENCOUNTER (OUTPATIENT)
Dept: CALL CENTER | Facility: HOSPITAL | Age: 70
End: 2025-06-23
Payer: MEDICARE

## 2025-06-23 NOTE — OUTREACH NOTE
Call Center TCM Note      Flowsheet Row Responses   [facility] facility patient discharged from? Non-BH  [[facility]]   Does the patient have one of the following disease processes/diagnoses(primary or secondary)? Other   TCM attempt successful? No   Unsuccessful attempts Attempt 1            LULA SYKES - Registered Nurse    6/23/2025, 15:46 EDT

## 2025-06-23 NOTE — OUTREACH NOTE
Call Center TCM Note      Flowsheet Row Responses   [facility] facility patient discharged from? Non-BH  [[facility]]   Does the patient have one of the following disease processes/diagnoses(primary or secondary)? Other   TCM attempt successful? No   Unsuccessful attempts Attempt 2            Rhona Goldman Licensed Nurse    6/23/2025, 16:04 EDT

## 2025-06-24 ENCOUNTER — TRANSITIONAL CARE MANAGEMENT TELEPHONE ENCOUNTER (OUTPATIENT)
Dept: CALL CENTER | Facility: HOSPITAL | Age: 70
End: 2025-06-24
Payer: MEDICARE

## 2025-06-24 NOTE — OUTREACH NOTE
Call Center TCM Note      Flowsheet Row Responses   [facility] patient discharged from? Non-   Does the patient have one of the following disease processes/diagnoses(primary or secondary)? Other   TCM attempt successful? Yes   Call start time 1330   Call end time 1338   Discharge diagnosis Acute respiratory failure, unspecified whether with hypoxia or hypercapnia   Acute respiratory failure (HCC)   Meds reviewed with patient/caregiver? Yes   Is the patient having any side effects they believe may be caused by any medication additions or changes? Yes   Side effects comments  diarrhea   Does the patient have all medications ordered at discharge? Yes   Is the patient taking all medications as directed (includes completed medication regime)? Yes   Comments 6/25/2025 10:00 AM Arrive by 9:45 AM HOSPITAL FOLLOW UP   Does the patient have an appointment with their PCP within 7-14 days of discharge? Yes   What is the Home health agency?  Home Health was arranged at [facility] pt reported   DME comments Home O2 @3L continuously, pt reports that she has a pulse oximeter.   What is the patient's perception of their health status since discharge? Improving   Is the patient/caregiver able to teach back signs and symptoms related to disease process for when to call PCP? Yes   Is the patient/caregiver able to teach back signs and symptoms related to disease process for when to call 911? Yes   Is the patient/caregiver able to teach back the hierarchy of who to call/visit for symptoms/problems? PCP, Specialist, Home health nurse, Urgent Care, ED, 911 Yes   If the patient is a current smoker, are they able to teach back resources for cessation? Not a smoker  [Stopped in 2018]   TCM call completed? Yes   Call end time 1338            Bhumika Goldman Registered Nurse    6/24/2025, 13:38 EDT

## 2025-06-25 ENCOUNTER — OFFICE VISIT (OUTPATIENT)
Dept: FAMILY MEDICINE CLINIC | Facility: CLINIC | Age: 70
End: 2025-06-25
Payer: MEDICARE

## 2025-06-25 ENCOUNTER — TELEPHONE (OUTPATIENT)
Dept: FAMILY MEDICINE CLINIC | Facility: CLINIC | Age: 70
End: 2025-06-25

## 2025-06-25 VITALS
HEART RATE: 71 BPM | WEIGHT: 133.6 LBS | BODY MASS INDEX: 22.81 KG/M2 | OXYGEN SATURATION: 98 % | DIASTOLIC BLOOD PRESSURE: 74 MMHG | SYSTOLIC BLOOD PRESSURE: 122 MMHG | HEIGHT: 64 IN

## 2025-06-25 DIAGNOSIS — Q24.8 PERICARDIAL CYST: ICD-10-CM

## 2025-06-25 DIAGNOSIS — I48.0 PAROXYSMAL ATRIAL FIBRILLATION: Chronic | ICD-10-CM

## 2025-06-25 DIAGNOSIS — N18.32 STAGE 3B CHRONIC KIDNEY DISEASE: ICD-10-CM

## 2025-06-25 DIAGNOSIS — R91.8 PULMONARY NODULES/LESIONS, MULTIPLE: ICD-10-CM

## 2025-06-25 DIAGNOSIS — D64.9 ANEMIA, UNSPECIFIED TYPE: ICD-10-CM

## 2025-06-25 DIAGNOSIS — J18.9 MULTIFOCAL PNEUMONIA: Primary | ICD-10-CM

## 2025-06-25 PROBLEM — L89.621 PRESSURE ULCER OF LEFT HEEL, STAGE 1: Status: ACTIVE | Noted: 2025-06-23

## 2025-06-25 PROBLEM — E55.9 VITAMIN D DEFICIENCY: Status: ACTIVE | Noted: 2025-06-25

## 2025-06-25 PROBLEM — J96.00 ACUTE RESPIRATORY FAILURE: Chronic | Status: ACTIVE | Noted: 2025-05-22

## 2025-06-25 PROBLEM — L89.621 PRESSURE ULCER OF LEFT HEEL, STAGE 1: Status: RESOLVED | Noted: 2025-06-23 | Resolved: 2025-06-25

## 2025-06-25 PROBLEM — L89.611 PRESSURE ULCER OF RIGHT HEEL, STAGE 1: Status: ACTIVE | Noted: 2025-06-23

## 2025-06-25 PROBLEM — L89.611 PRESSURE ULCER OF RIGHT HEEL, STAGE 1: Status: RESOLVED | Noted: 2025-06-23 | Resolved: 2025-06-25

## 2025-06-25 RX ORDER — VALSARTAN 160 MG/1
160 TABLET ORAL DAILY
COMMUNITY
Start: 2025-06-20 | End: 2025-07-20

## 2025-06-25 RX ORDER — AMLODIPINE BESYLATE 5 MG/1
5 TABLET ORAL DAILY
COMMUNITY

## 2025-06-25 RX ORDER — DOXYCYCLINE 100 MG/1
100 CAPSULE ORAL 2 TIMES DAILY
COMMUNITY
Start: 2025-06-19 | End: 2025-06-29

## 2025-06-25 RX ORDER — AMIODARONE HYDROCHLORIDE 200 MG/1
200 TABLET ORAL DAILY
COMMUNITY
Start: 2025-06-20 | End: 2025-07-20

## 2025-06-25 RX ORDER — CEFDINIR 300 MG/1
300 CAPSULE ORAL 2 TIMES DAILY
COMMUNITY
Start: 2025-06-19 | End: 2025-06-29

## 2025-06-25 RX ORDER — PREDNISONE 10 MG/1
40 TABLET ORAL DAILY
COMMUNITY
Start: 2025-06-19 | End: 2025-06-29

## 2025-06-25 NOTE — PROGRESS NOTES
"Transitional Care Follow Up Visit  Subjective     Orin Munoz is a 70 y.o. female who presents for a transitional care management visit.    Within 48 business hours after discharge our office contacted her via telephone to coordinate her care and needs.      I reviewed and discussed the details of that call along with the discharge summary, hospital problems, inpatient lab results, inpatient diagnostic studies, and consultation reports with Orin.     Current outpatient and discharge medications have been reconciled for the patient.  Reviewed by: GABRIELLA Escobar          6/22/2025    11:45 AM   Date of TCM Phone Call   [facility]   Date of Admission 5/31/2025   Date of Discharge 6/21/2025   Discharge Disposition Home or Self Care     Risk for Readmission (LACE) No data recorded    History of Present Illness   Course During Hospital Stay:  05/31/2025- 06/21/2025 at [facility] for respiratory failure.  She stayed 2 nights in [facility] for an 3 weeks at [place].  She had bilateral pneumonia.  She has pulmonary nodules.  She came home on oxygen.  She thinks she will be on oxygen the rest of her life.  She has not yet started her Eliquis as her sister was concerned about this causing a bleed but they also started her on amiodarone because she was in A-fib at the hospital.  Patient has not yet scheduled a follow-up appointment with her cardiologist.  She says she also needs a pulmonologist.     The following portions of the patient's history were reviewed and updated as appropriate: allergies, current medications, past family history, past medical history, past social history, past surgical history, and problem list.    Review of Systems    Objective   /74 (BP Location: Left arm, Patient Position: Sitting, Cuff Size: Adult)   Pulse 71   Ht 162.6 cm (64\")   Wt 60.6 kg (133 lb 9.6 oz)   SpO2 98%   BMI 22.93 kg/m²   Physical Exam  Constitutional:       " Appearance: Normal appearance.      Comments: Wearing oxygen via nasal cannula.   Cardiovascular:      Rate and Rhythm: Normal rate and regular rhythm.   Pulmonary:      Effort: Pulmonary effort is normal.      Breath sounds: Normal breath sounds.   Neurological:      Mental Status: She is alert.         Assessment & Plan   Problems Addressed this Visit          Cardiac and Vasculature    Paroxysmal atrial fibrillation (Chronic)    Discussed the importance of taking the Eliquis because of the A-fib.  Contacted cardiology and they gave her a follow-up appointment for day after tomorrow.         Relevant Medications    amLODIPine (NORVASC) 5 MG tablet    Pericardial cyst       Genitourinary and Reproductive     Stage 3b chronic kidney disease    Will check a CMP.         Relevant Orders    Comprehensive Metabolic Panel       Hematology and Neoplasia    Anemia    Anemia in the hospital, will check a CBC and iron studies.         Relevant Orders    Iron Profile w/o Ferritin    Ferritin    CBC Auto Differential    Folate    Vitamin B12       Pulmonary and Pneumonias    Pulmonary nodules/lesions, multiple    Relevant Orders    Ambulatory Referral to Pulmonology    Multifocal pneumonia - Primary    Resolving, multiple pulmonary nodules so refer to pulmonary nodule clinic and they may refer on to a general pulmonologist but this will be in [place].         Relevant Medications    cefdinir (OMNICEF) 300 MG capsule    doxycycline (VIBRAMYCIN) 100 MG capsule     Diagnoses         Codes Comments      Multifocal pneumonia    -  Primary ICD-10-CM: J18.9  ICD-9-CM: 486       Pulmonary nodules/lesions, multiple     ICD-10-CM: R91.8  ICD-9-CM: 793.19       Anemia, unspecified type     ICD-10-CM: D64.9  ICD-9-CM: 285.9       Stage 3b chronic kidney disease     ICD-10-CM: N18.32  ICD-9-CM: 585.3       Paroxysmal atrial fibrillation     ICD-10-CM: I48.0  ICD-9-CM: 427.31       Pericardial cyst     ICD-10-CM: Q24.8  ICD-9-CM: 746.89

## 2025-06-25 NOTE — TELEPHONE ENCOUNTER
Caller: Orin Munoz    Relationship: Self    Best call back number:   Telephone Information:   Mobile [phone]        What medication are you requesting: SOMETHING TO REPLACE Evolocumab (REPATHA) solution auto-injector SureClick injection     Have you had these symptoms before:    [x] Yes  [] No    Have you been treated for these symptoms before:   [x] Yes  [] No    If a prescription is needed, what is your preferred pharmacy and phone number: [id number] [place] [address] [address]  AT [facility] [address] - [phone] [facility] [phone] FX     Additional notes: PATIENTS INSURANCE WILL NO LONGER COVER THIS MEDICATION.

## 2025-06-25 NOTE — ASSESSMENT & PLAN NOTE
Resolving, multiple pulmonary nodules so refer to pulmonary nodule clinic and they may refer on to a general pulmonologist but this will be in [place].

## 2025-06-25 NOTE — ASSESSMENT & PLAN NOTE
Discussed the importance of taking the Eliquis because of the A-fib.  Contacted cardiology and they gave her a follow-up appointment for day after tomorrow.

## 2025-06-26 LAB
ALBUMIN SERPL-MCNC: 3.5 G/DL (ref 3.8–4.8)
ALP SERPL-CCNC: 101 IU/L (ref 44–121)
ALT SERPL-CCNC: 21 IU/L (ref 0–32)
AST SERPL-CCNC: 22 IU/L (ref 0–40)
BASOPHILS # BLD AUTO: 0 X10E3/UL (ref 0–0.2)
BASOPHILS NFR BLD AUTO: 0 %
BILIRUB SERPL-MCNC: 0.2 MG/DL (ref 0–1.2)
BUN SERPL-MCNC: 28 MG/DL (ref 8–27)
BUN/CREAT SERPL: 19 (ref 12–28)
CALCIUM SERPL-MCNC: 9.4 MG/DL (ref 8.7–10.3)
CHLORIDE SERPL-SCNC: 104 MMOL/L (ref 96–106)
CO2 SERPL-SCNC: 19 MMOL/L (ref 20–29)
CREAT SERPL-MCNC: 1.5 MG/DL (ref 0.57–1)
EGFRCR SERPLBLD CKD-EPI 2021: 37 ML/MIN/1.73
EOSINOPHIL # BLD AUTO: 0 X10E3/UL (ref 0–0.4)
EOSINOPHIL NFR BLD AUTO: 0 %
ERYTHROCYTE [DISTWIDTH] IN BLOOD BY AUTOMATED COUNT: 17.7 % (ref 11.7–15.4)
FERRITIN SERPL-MCNC: 138 NG/ML (ref 15–150)
FOLATE SERPL-MCNC: 11.6 NG/ML
GLOBULIN SER CALC-MCNC: 2.5 G/DL (ref 1.5–4.5)
GLUCOSE SERPL-MCNC: 89 MG/DL (ref 70–99)
HCT VFR BLD AUTO: 33 % (ref 34–46.6)
HGB BLD-MCNC: 9.7 G/DL (ref 11.1–15.9)
IMM GRANULOCYTES # BLD AUTO: 0.1 X10E3/UL (ref 0–0.1)
IMM GRANULOCYTES NFR BLD AUTO: 1 %
IRON SATN MFR SERPL: 25 % (ref 15–55)
IRON SERPL-MCNC: 53 UG/DL (ref 27–139)
LYMPHOCYTES # BLD AUTO: 0.9 X10E3/UL (ref 0.7–3.1)
LYMPHOCYTES NFR BLD AUTO: 11 %
MCH RBC QN AUTO: 28.3 PG (ref 26.6–33)
MCHC RBC AUTO-ENTMCNC: 29.4 G/DL (ref 31.5–35.7)
MCV RBC AUTO: 96 FL (ref 79–97)
MONOCYTES # BLD AUTO: 0.4 X10E3/UL (ref 0.1–0.9)
MONOCYTES NFR BLD AUTO: 4 %
NEUTROPHILS # BLD AUTO: 6.9 X10E3/UL (ref 1.4–7)
NEUTROPHILS NFR BLD AUTO: 84 %
PLATELET # BLD AUTO: 362 X10E3/UL (ref 150–450)
POTASSIUM SERPL-SCNC: 5.2 MMOL/L (ref 3.5–5.2)
PROT SERPL-MCNC: 6 G/DL (ref 6–8.5)
RBC # BLD AUTO: 3.43 X10E6/UL (ref 3.77–5.28)
SODIUM SERPL-SCNC: 140 MMOL/L (ref 134–144)
TIBC SERPL-MCNC: 215 UG/DL (ref 250–450)
UIBC SERPL-MCNC: 162 UG/DL (ref 118–369)
VIT B12 SERPL-MCNC: 494 PG/ML (ref 232–1245)
WBC # BLD AUTO: 8.2 X10E3/UL (ref 3.4–10.8)

## 2025-06-27 ENCOUNTER — OFFICE VISIT (OUTPATIENT)
Dept: CARDIOLOGY | Facility: CLINIC | Age: 70
End: 2025-06-27
Payer: MEDICARE

## 2025-06-27 VITALS
HEART RATE: 90 BPM | OXYGEN SATURATION: 96 % | BODY MASS INDEX: 23 KG/M2 | SYSTOLIC BLOOD PRESSURE: 136 MMHG | DIASTOLIC BLOOD PRESSURE: 82 MMHG | WEIGHT: 134 LBS

## 2025-06-27 DIAGNOSIS — I25.10 CORONARY ARTERY CALCIFICATION SEEN ON CT SCAN: ICD-10-CM

## 2025-06-27 DIAGNOSIS — I71.60 THORACOABDOMINAL AORTIC ANEURYSM (TAAA) WITHOUT RUPTURE, UNSPECIFIED PART: ICD-10-CM

## 2025-06-27 DIAGNOSIS — I10 PRIMARY HYPERTENSION: Primary | ICD-10-CM

## 2025-06-27 DIAGNOSIS — E78.2 MIXED HYPERLIPIDEMIA: ICD-10-CM

## 2025-06-27 DIAGNOSIS — I73.9 PVD (PERIPHERAL VASCULAR DISEASE) WITH CLAUDICATION: ICD-10-CM

## 2025-06-27 DIAGNOSIS — I48.0 PAROXYSMAL ATRIAL FIBRILLATION: ICD-10-CM

## 2025-06-27 NOTE — ASSESSMENT & PLAN NOTE
Lipid abnormalities are improving with treatment    Plan:  Continue same medication/s without change.  Atorvastatin 80 mg p.o. daily and Vascepa 2 g twice daily    Discussed medication dosage, use, side effects, and goals of treatment in detail.    Counseled patient on lifestyle modifications to help control hyperlipidemia.   Cholesterol lowering dietary information shared with patient.  Advised patient to exercise for 150 minutes weekly. (30 minute brisk walk, 5 days a week for example)    Lab Results   Component Value Date    LDL 31 11/07/2024     (H) 12/08/2023     (H) 07/28/2023    HDL 44 11/07/2024    HDL 60 12/08/2023    HDL 45 07/28/2023    CHLPL 91 (L) 11/07/2024    CHLPL 211 (H) 12/08/2023    CHLPL 173 07/28/2023    TRIG 74 11/07/2024    TRIG 165 (H) 12/08/2023    TRIG 160 (H) 07/28/2023       Patient Treatment Goals:   LDL goal is less than 70.  To target  Followup in 6 months.

## 2025-06-27 NOTE — ASSESSMENT & PLAN NOTE
Currently asymptomatic.  Used to be followed by Dr. Alicia, not anymore.  PVD seen prior stenting left femoral/iliac 6 years ago.  Past workup showing:  US duplex of BLE 3/2022  50-69% suggested narrowing in both CFA  30-59% narrowing in the BLE SFA.   Repeat CTA on 3/31/2025:  1. Aortoiliac and femoral-femoral bypass grafts patent without suspicious perigraft fluid or pseudoaneurysm.  2. Stable excluded infrarenal abdominal aortic aneurysm measuring up to 4 cm without evidence of endoleak. Stable varying degrees of stenosis involving the visceral branches, without evidence of new solid organ or hollow viscus ischemia. Stable left renal atrophy and delayed perfusion.  3. Bilateral two-vessel runoff at the level of the ankle including patent anterior and posterior tibial arteries and diminutive fibular arteries. Stable degrees of nonflow limiting inflow stenoses. Stable left common iliac artery stent occlusion.  Patient with clinical claudication left lower extremity, nonetheless current picture statica more dominant.  Risk factors modification: Blood pressure to goal, lipid-lowering therapy optimized.  Consider cilostazol for claudication next visit if symptomatic.

## 2025-06-27 NOTE — PROGRESS NOTES
[facility] [place]  [facility] CARDIOLOGY  [address] [place] [address]  Dept: [phone]  Dept Fax: [phone]    Date: 06/27/2025  Patient: Orin Munoz  YOB: 1955    Follow Up Office Visit Note    Interval Follow-up  Ms. Orin Munoz is a 70 y.o. female who is here for follow-up on Hospital Follow Up Visit (afib).    Subjective   Patient doing well today with no acute complaints.  On and off shortness of breath while on oxygen.  Has pulse oximeter at home.   Recently admitted to the hospital for multifocal pneumonia.  She had high oxygen requirements while inpatient, transferred to LTAC at [facility].    Patient denies angina, orthopnea, PND, palpitations, lightheadedness, syncope or medications side-effects.    The following portions of the patient's history were reviewed and updated as appropriate: allergies, current medications, past family history, past medical history, past social history, past surgical history, and problem list.    Medications:   Allergies   Allergen Reactions    Penicillins Hives    Metaproterenol Other (See Comments)     Pt did not recognize this med     Ipratropium Bromide Anxiety    Metoprolol Hives    Other Hives     Alupent       Current Outpatient Medications   Medication Instructions    albuterol sulfate  (90 Base) MCG/ACT inhaler 2 puffs, Inhalation, Every 4 Hours PRN    allopurinol (ZYLOPRIM) 300 mg, Oral, Daily    amiodarone (PACERONE) 200 mg, Daily    amLODIPine (NORVASC) 5 mg, Daily    apixaban (ELIQUIS) 5 mg, Every 12 Hours Scheduled    aspirin 81 mg, Daily    atorvastatin (LIPITOR) 40 mg, Oral, Daily    cefdinir (OMNICEF) 300 mg, 2 Times Daily    doxycycline (VIBRAMYCIN) 100 mg, 2 Times Daily    escitalopram (LEXAPRO) 10 mg, Oral, Daily    Evolocumab (REPATHA) 140 mg, Subcutaneous, Every 14 Days    ibandronate (BONIVA) 150 mg, Oral, Every 30 Days    icosapent ethyl (VASCEPA) 2 g, Oral, 2 Times Daily With  "Meals    predniSONE (DELTASONE) 40 mg, Daily    valsartan (DIOVAN) 160 mg, Daily       Tobacco Use: Medium Risk (6/27/2025)    Patient History     Smoking Tobacco Use: Former     Smokeless Tobacco Use: Never     Passive Exposure: Past        Objective  Vitals:    06/27/25 1034   BP: 136/82   Pulse: 90   SpO2: 96%   Weight: 60.8 kg (134 lb)      Vitals:    06/27/25 1034   BP: 136/82   Pulse: 90   SpO2: 96%   Weight: 60.8 kg (134 lb)          Physical Exam  Constitutional:       Appearance: Not in distress.   Neck:      Vascular: JVD normal.   Pulmonary:      Breath sounds: Decreased breath sounds present.   Cardiovascular:      Normal rate. Regular rhythm.      Murmurs: There is no murmur.   Pulses:     Intact distal pulses.   Edema:     Peripheral edema absent.   Neurological:      Mental Status: Alert.              Diagnostic Data  Lab Results   Component Value Date     06/25/2025    K 5.2 06/25/2025     06/25/2025    CO2 19 (L) 06/25/2025    MG 2 06/14/2025    BUN 28 (H) 06/25/2025    CREATININE 1.50 (H) 06/25/2025    CALCIUM 9.4 06/25/2025    BILITOT 0.2 06/25/2025    ALKPHOS 101 06/25/2025    ALT 21 06/25/2025    AST 22 06/25/2025    GLUCOSE 89 06/25/2025    ALBUMIN 3.5 (L) 06/25/2025     Lab Results   Component Value Date    WBC 8.2 06/25/2025    HGB 9.7 (L) 06/25/2025    HCT 33.0 (L) 06/25/2025     06/25/2025     Lab Results   Component Value Date    INR 0.97 10/09/2024    PTT 27.7 10/09/2024     Lab Results   Component Value Date    CKTOTAL 99 12/08/2023    CKTOTAL 82 07/28/2023     No results found for: \"BNP\", \"PROBNP\"    Lab Results   Component Value Date    CHLPL 91 (L) 11/07/2024    TRIG 74 11/07/2024    HDL 44 11/07/2024    LDL 31 11/07/2024     Lab Results   Component Value Date    TSH 1.585 06/11/2025    FREET4 1.17 07/28/2023       [facility]:    ECG 12 Lead    Date/Time: 6/27/2025 11:25 AM  Performed by: Cornelio Rollins MD    Authorized by: Cornelio Rollins MD  Comparison: " compared with previous ECG from 5/19/2025  Comparison to previous ECG: Interim resolution of nonspecific ST-T changes and QT prolongation  Rhythm: sinus rhythm    Clinical impression: normal ECG  Comments: Normal sinus rhythm        CXR: Results for orders placed during the hospital encounter of 10/09/24    XR Chest PA & Lateral    Narrative  XR CHEST PA AND LATERAL    Date of Exam: 10/9/2024 1:39 PM EDT    Indication: Pre-Op Cardiac Surgery    Comparison: 1/16/2024    Findings:  Lung fields are moderately hyperinflated. There is biapical pleural-parenchymal scarring. There is prominence of central pulmonary arteries. Heart size is normal. There are no acute infiltrates or effusions.    Impression  Impression:  Emphysematous changes. No acute process.      Electronically Signed: Comfort Encinas MD  10/10/2024 2:20 PM EDT  Workstation ID: [id number]     ECHO/MUGA: No results found for this or any previous visit.     STRESS TESTS: Results for orders placed in visit on 05/30/24    Stress Test With Myocardial Perfusion One Day    Interpretation Summary    Impressions are consistent with a normal/low risk Lexiscan nuclear study.    Myocardial perfusion imaging indicates a normal myocardial perfusion study with no evidence of ischemia.    Left ventricular ejection fraction is normal (Calculated EF = 65%).    Breast attenuation artifact is present.     CARDIAC CATH: No results found for this or any previous visit.     DEVICES: No valid procedures specified.   HOLTER: No results found for this or any previous visit.     CT/MRI:  No results found for this or any previous visit.    VASCULAR: No valid procedures specified.     Assessment and Plan  Diagnoses and all orders for this visit:    1. Primary hypertension (Primary)  Assessment & Plan:  Hypertension is stable and controlled  Continue current treatment regimen.  Dietary sodium restriction.  Regular aerobic exercise.  Ambulatory blood pressure monitoring.  Blood pressure  will be reassessed in 6 months.      2. Mixed hyperlipidemia  Assessment & Plan:   Lipid abnormalities are improving with treatment    Plan:  Continue same medication/s without change.  Atorvastatin 80 mg p.o. daily and Vascepa 2 g twice daily    Discussed medication dosage, use, side effects, and goals of treatment in detail.    Counseled patient on lifestyle modifications to help control hyperlipidemia.   Cholesterol lowering dietary information shared with patient.  Advised patient to exercise for 150 minutes weekly. (30 minute brisk walk, 5 days a week for example)    Lab Results   Component Value Date    LDL 31 11/07/2024     (H) 12/08/2023     (H) 07/28/2023    HDL 44 11/07/2024    HDL 60 12/08/2023    HDL 45 07/28/2023    CHLPL 91 (L) 11/07/2024    CHLPL 211 (H) 12/08/2023    CHLPL 173 07/28/2023    TRIG 74 11/07/2024    TRIG 165 (H) 12/08/2023    TRIG 160 (H) 07/28/2023       Patient Treatment Goals:   LDL goal is less than 70.  To target  Followup in 6 months.    Orders:  -     Evolocumab (REPATHA) solution auto-injector SureClick injection; Inject 1 mL under the skin into the appropriate area as directed Every 14 (Fourteen) Days.  Dispense: 6 mL; Refill: 4    3. Coronary artery calcification seen on CT scan  Assessment & Plan:  Heavy calcification seen.  EKG abnormal.  No chest pain.  Limited exercise capacity with left lower extremity cramping and shooting pain.  Lexiscan nuclear stress test negative.  Plan:  Risk factors modifications as above    Orders:  -     Evolocumab (REPATHA) solution auto-injector SureClick injection; Inject 1 mL under the skin into the appropriate area as directed Every 14 (Fourteen) Days.  Dispense: 6 mL; Refill: 4    4. 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  Ascending thoracic aorta 3.8 cm and abdominal aorta 3.3 cm/4.1 cm distally.  Stable disease on CT abdominal aorta  with distal runoff.      5. PVD (peripheral vascular disease) with claudication  Assessment & Plan:  Currently asymptomatic.  Used to be followed by Dr. Alicia, not anymore.  PVD seen prior stenting left femoral/iliac 6 years ago.  Past workup showing:  US duplex of BLE 3/2022  50-69% suggested narrowing in both CFA  30-59% narrowing in the BLE SFA.   Repeat CTA on 3/31/2025:  1. Aortoiliac and femoral-femoral bypass grafts patent without suspicious perigraft fluid or pseudoaneurysm.  2. Stable excluded infrarenal abdominal aortic aneurysm measuring up to 4 cm without evidence of endoleak. Stable varying degrees of stenosis involving the visceral branches, without evidence of new solid organ or hollow viscus ischemia. Stable left renal atrophy and delayed perfusion.  3. Bilateral two-vessel runoff at the level of the ankle including patent anterior and posterior tibial arteries and diminutive fibular arteries. Stable degrees of nonflow limiting inflow stenoses. Stable left common iliac artery stent occlusion.  Patient with clinical claudication left lower extremity, nonetheless current picture statica more dominant.  Risk factors modification: Blood pressure to goal, lipid-lowering therapy optimized.  Consider cilostazol for claudication next visit if symptomatic.    Orders:  -     Evolocumab (REPATHA) solution auto-injector SureClick injection; Inject 1 mL under the skin into the appropriate area as directed Every 14 (Fourteen) Days.  Dispense: 6 mL; Refill: 4    6. Paroxysmal atrial fibrillation  Assessment & Plan:  Diagnosis when was inpatient on 6/11/2025.  On amiodarone 200 mg p.o. daily and Eliquis for thromboembolic prevention.  Infrequent palpitations.  Consider weaning of amiodarone at follow-up visit in 4 weeks, in view of precarious respiratory status.    Orders:  -     Cancel: ECG 12 Lead  -     ECG 12 Lead         Return in about 4 weeks (around 7/25/2025) for Follow-up with Dr Rollins.    Patient  Instructions   [facility] [place]  [facility] CARDIOLOGY  [address] [place] [address]  Dept: [phone]  Dept Fax: [phone]    Date:   Patient: Orin Munoz    Blood Pressure Log  Goal blood Pressure <130/80          Provided By: Cornelio Rollins MD    Date Blood Pressure Heart Rate Comments   Saturday June 28, 2025       Abdullahi June 29, 2025       Monday June 30, 2025       Tuesday July 1, 2025       Wednesday July 2, 2025       Thursday July 3, 2025       Friday July 4, 2025       Saturday July 5, 2025       Abdullahi July 6, 2025       Monday July 7, 2025       Tuesday July 8, 2025       Wednesday July 9, 2025       Thursday July 10, 2025       Friday July 11, 2025       Saturday July 12, 2025       Abdullahi July 13, 2025       Monday July 14, 2025       Tuesday July 15, 2025       Wednesday July 16, 2025       Thursday July 17, 2025       Friday July 18, 2025       Saturday July 19, 2025       Abdullahi July 20, 2025       Monday July 21, 2025       Tuesday July 22, 2025       Wednesday July 23, 2025       Thursday July 24, 2025       Friday July 25, 2025       Saturday July 26, 2025       Abdullahi July 27, 2025       Monday July 28, 2025       Tuesday July 29, 2025       Wednesday July 30, 2025       Thursday July 31, 2025       Friday August 1, 2025       Saturday August 2, 2025       Abdullhai August 3, 2025       Monday August 4, 2025       Tuesday August 5, 2025       Wednesday August 6, 2025       Thursday August 7, 2025       Friday August 8, 2025       Saturday August 9, 2025       Abdullahi August 10, 2025       Monday August 11, 2025       Tuesday August 12, 2025       Wednesday August 13, 2025       Thursday August 14, 2025       Friday August 15, 2025       Saturday August 16, 2025       Abdullahi August 17, 2025       Monday August 18, 2025       Tuesday August 19, 2025       Wednesday August 20, 2025       Thursday August 21, 2025       Friday August 22, 2025       Saturday  August 23, 2025 Sunday August 24, 2025 Monday August 25, 2025            Cornelio Rollins MD

## 2025-06-27 NOTE — ASSESSMENT & PLAN NOTE
Seen on low-dose CT chest and MRI.  Ascending thoracic aorta 3.8 cm and abdominal aorta 3.3 cm/4.1 cm distally.  Stable disease on CT abdominal aorta with distal runoff.

## 2025-06-27 NOTE — ASSESSMENT & PLAN NOTE
Diagnosis when was inpatient on 6/11/2025.  On amiodarone 200 mg p.o. daily and Eliquis for thromboembolic prevention.  Infrequent palpitations.  Consider weaning of amiodarone at follow-up visit in 4 weeks, in view of precarious respiratory status.

## 2025-06-27 NOTE — PATIENT INSTRUCTIONS
[facility] [place]  [facility] CARDIOLOGY  [address] [place] [address]  Dept: [phone]  Dept Fax: [phone]    Date:   Patient: Orin Munoz    Blood Pressure Log  Goal blood Pressure <130/80          Provided By: Cornelio Rollins MD    Date Blood Pressure Heart Rate Comments   Saturday June 28, 2025       Abdullahi June 29, 2025       Monday June 30, 2025       Tuesday July 1, 2025       Wednesday July 2, 2025       Thursday July 3, 2025       Friday July 4, 2025       Saturday July 5, 2025       Abdullahi July 6, 2025       Monday July 7, 2025       Tuesday July 8, 2025       Wednesday July 9, 2025       Thursday July 10, 2025       Friday July 11, 2025       Saturday July 12, 2025       Abdullahi July 13, 2025       Monday July 14, 2025       Tuesday July 15, 2025       Wednesday July 16, 2025       Thursday July 17, 2025       Friday July 18, 2025       Saturday July 19, 2025       Abdullahi July 20, 2025       Monday July 21, 2025       Tuesday July 22, 2025       Wednesday July 23, 2025       Thursday July 24, 2025       Friday July 25, 2025       Saturday July 26, 2025       Abdullahi July 27, 2025       Monday July 28, 2025       Tuesday July 29, 2025       Wednesday July 30, 2025       Thursday July 31, 2025       Friday August 1, 2025       Saturday August 2, 2025       Abdullahi August 3, 2025       Monday August 4, 2025       Tuesday August 5, 2025       Wednesday August 6, 2025       Thursday August 7, 2025       Friday August 8, 2025       Saturday August 9, 2025       Abdullahi August 10, 2025       Monday August 11, 2025       Tuesday August 12, 2025       Wednesday August 13, 2025       Thursday August 14, 2025       Friday August 15, 2025       Saturday August 16, 2025       Abdullahi August 17, 2025       Monday August 18, 2025       Tuesday August 19, 2025       Wednesday August 20, 2025       Thursday August 21, 2025       Friday August 22, 2025       Saturday August 23, 2025        Sunday August 24, 2025 Monday August 25, 2025

## 2025-06-30 ENCOUNTER — TELEPHONE (OUTPATIENT)
Dept: CARDIOLOGY | Facility: CLINIC | Age: 70
End: 2025-06-30
Payer: MEDICARE

## 2025-06-30 NOTE — TELEPHONE ENCOUNTER
PA submitted and approved    Orin Munoz ([place]: [id number])  PA Case ID #: [id number]  Rx #: [id number]  Approved  Coverage Starts on: 1/1/2025 12:00:00 AM, Coverage Ends on: 12/31/2025 12:00:00 AM.   Effective Date: 1/1/2025  Authorization Expiration Date: 12/31/2025  Drug  Repatha SureClick 140MG/ML auto-injectors

## 2025-06-30 NOTE — TELEPHONE ENCOUNTER
[facility] [employer] insurance called to inform us that pt's Repatha will need a PA. Wants to make sure we will be submitting.

## 2025-07-01 ENCOUNTER — TELEPHONE (OUTPATIENT)
Dept: FAMILY MEDICINE CLINIC | Facility: CLINIC | Age: 70
End: 2025-07-01
Payer: MEDICARE

## 2025-07-01 RX ORDER — AMLODIPINE BESYLATE 5 MG/1
5 TABLET ORAL DAILY
COMMUNITY
End: 2025-07-04

## 2025-07-01 RX ORDER — AMLODIPINE BESYLATE 5 MG/1
5 TABLET ORAL DAILY
Qty: 30 TABLET | Refills: 0 | Status: CANCELLED | OUTPATIENT
Start: 2025-07-01

## 2025-07-01 NOTE — TELEPHONE ENCOUNTER
Yomaira Saldana PA to Me  [facility]  (Selected Message)        7/1/25 12:32 PM  Needs appointment, may be seen urgent care of if short of breath, should go back to the ER.    Called to inform patient, no answer, left message to call back.  (Hub to relay)

## 2025-07-01 NOTE — TELEPHONE ENCOUNTER
Caller: Orin Munoz    Relationship: Self    Best call back number: [phone]     What medication are you requesting:     PREDNISONE 10 MG, 4 TABLES DAILY W/BREAKFAST.    DOXYCYCLINE HYCLATE 100 MG 1 CAPSULE BY MOUTH EVERY 12 HRS.    CEFDINIR 300 MG 1 CAPSULE BY MOUTH 2X DAILY    What are your current symptoms: BREATHING PROBLEMS    How long have you been experiencing symptoms: 6 WEEKS    Have you had these symptoms before:    [x] Yes  [] No    Have you been treated for these symptoms before:   [x] Yes  [] No    If a prescription is needed, what is your preferred pharmacy and phone number: [id number] - [place], [place] - [address] AT [facility] [address] - [phone]  - [phone] FX     Additional notes: THESE ARE NEW TO THE PT'S CURRENT MEDICATION LIST.

## 2025-07-01 NOTE — TELEPHONE ENCOUNTER
Pt called about Rx refills post hospital stay. She was needing refills of Amlodipine 5mg QD, Prednisone, Ceftin and Doxy. Advised the latter three are temporary and once she is done she is done with those. Pt has f/u with Cardio 7/23 and can discuss Amlodipine, pt states she will run out before that appt. Rx ready for refill if appropriate.

## 2025-07-04 RX ORDER — AMLODIPINE BESYLATE 5 MG/1
5 TABLET ORAL DAILY
Qty: 30 TABLET | Refills: 2 | Status: SHIPPED | OUTPATIENT
Start: 2025-07-04

## 2025-07-05 DIAGNOSIS — F33.40 RECURRENT MAJOR DEPRESSIVE DISORDER, IN REMISSION: ICD-10-CM

## 2025-07-05 DIAGNOSIS — F41.1 GENERALIZED ANXIETY DISORDER: ICD-10-CM

## 2025-07-07 DIAGNOSIS — M81.0 AGE-RELATED OSTEOPOROSIS WITHOUT CURRENT PATHOLOGICAL FRACTURE: ICD-10-CM

## 2025-07-07 NOTE — TELEPHONE ENCOUNTER
Rx Refill Note    Requested Prescriptions     Pending Prescriptions Disp Refills    escitalopram (LEXAPRO) 10 MG tablet [Pharmacy Med Name: ESCITALOPRAM 10MG TABLETS] 90 tablet 0     Sig: TAKE 1 TABLET BY MOUTH DAILY        Last office visit with prescribing clinician: 6/25/2025      Next office visit with prescribing clinician: 9/25/2025   Last labs:   Last refill: 10/24/24   Pharmacy (be sure to add in Epic). correct

## 2025-07-08 RX ORDER — IBANDRONATE SODIUM 150 MG/1
150 TABLET, FILM COATED ORAL
Qty: 3 TABLET | Refills: 0 | Status: SHIPPED | OUTPATIENT
Start: 2025-07-08

## 2025-07-09 RX ORDER — SERTRALINE HYDROCHLORIDE 100 MG/1
100 TABLET, FILM COATED ORAL DAILY
Qty: 90 TABLET | Refills: 1 | Status: SHIPPED | OUTPATIENT
Start: 2025-07-09

## 2025-07-09 RX ORDER — ESCITALOPRAM OXALATE 10 MG/1
10 TABLET ORAL DAILY
Qty: 90 TABLET | Refills: 0 | OUTPATIENT
Start: 2025-07-09

## 2025-07-10 ENCOUNTER — TELEPHONE (OUTPATIENT)
Dept: FAMILY MEDICINE CLINIC | Facility: CLINIC | Age: 70
End: 2025-07-10
Payer: MEDICARE

## 2025-07-10 NOTE — TELEPHONE ENCOUNTER
Caller: Orin Munoz    Relationship: Self    Best call back number: [phone]     What was the call regarding: PATIENT STATED THAT SHE WAS RECENTLY IN THE HOSPITAL FOR 5 WEEKS AND HAS BEDSORE'S ON HER BOTTOM AND THEY ARE SORE AND BLEEDING AND WOULD LIKE TO BE INFORMED BY PAUL QUIROZ AS TO WHAT SHE CAN USE TO HELP THE BEDSORES HEAL

## 2025-07-10 NOTE — TELEPHONE ENCOUNTER
Called pt w message, she expressed verbal understanding. She will try taking care of the wounds on her own and will call back if she needs referral.

## 2025-07-16 DIAGNOSIS — M81.0 AGE-RELATED OSTEOPOROSIS WITHOUT CURRENT PATHOLOGICAL FRACTURE: ICD-10-CM

## 2025-07-16 RX ORDER — RISEDRONATE SODIUM 150 MG/1
TABLET, FILM COATED ORAL
Qty: 3 TABLET | Refills: 4 | OUTPATIENT
Start: 2025-07-16

## 2025-07-17 DIAGNOSIS — E79.0 HYPERURICEMIA: ICD-10-CM

## 2025-07-17 RX ORDER — ALLOPURINOL 300 MG/1
300 TABLET ORAL DAILY
Qty: 90 TABLET | Refills: 0 | Status: SHIPPED | OUTPATIENT
Start: 2025-07-17

## 2025-07-21 ENCOUNTER — TELEPHONE (OUTPATIENT)
Dept: FAMILY MEDICINE CLINIC | Facility: CLINIC | Age: 70
End: 2025-07-21
Payer: MEDICARE

## 2025-07-21 NOTE — TELEPHONE ENCOUNTER
Pt was seen at [facility] about a month ago and prescribed medications. Amio 200mg 1 PO QD X30D, Eliquis 5mg 2 PO QD X30D and Valsartan 160mg 1 PO QD X30D, pt has f/u with Cardio Wednesday, instructed to take bottles with her to appt with her as they are cardiac drugs. Just FYI

## 2025-07-23 ENCOUNTER — OFFICE VISIT (OUTPATIENT)
Dept: CARDIOLOGY | Facility: CLINIC | Age: 70
End: 2025-07-23
Payer: MEDICARE

## 2025-07-23 VITALS
DIASTOLIC BLOOD PRESSURE: 88 MMHG | BODY MASS INDEX: 22.88 KG/M2 | RESPIRATION RATE: 18 BRPM | HEIGHT: 64 IN | SYSTOLIC BLOOD PRESSURE: 130 MMHG | HEART RATE: 66 BPM | WEIGHT: 134 LBS | OXYGEN SATURATION: 93 %

## 2025-07-23 DIAGNOSIS — I10 PRIMARY HYPERTENSION: Primary | ICD-10-CM

## 2025-07-23 DIAGNOSIS — I48.0 PAROXYSMAL ATRIAL FIBRILLATION: ICD-10-CM

## 2025-07-23 DIAGNOSIS — I71.60 THORACOABDOMINAL AORTIC ANEURYSM (TAAA) WITHOUT RUPTURE, UNSPECIFIED PART: ICD-10-CM

## 2025-07-23 DIAGNOSIS — D50.8 OTHER IRON DEFICIENCY ANEMIA: ICD-10-CM

## 2025-07-23 DIAGNOSIS — I73.9 PVD (PERIPHERAL VASCULAR DISEASE) WITH CLAUDICATION: ICD-10-CM

## 2025-07-23 DIAGNOSIS — R06.02 SHORTNESS OF BREATH: ICD-10-CM

## 2025-07-23 DIAGNOSIS — I25.10 CORONARY ARTERY CALCIFICATION SEEN ON CT SCAN: ICD-10-CM

## 2025-07-23 DIAGNOSIS — E78.2 MIXED HYPERLIPIDEMIA: ICD-10-CM

## 2025-07-23 PROCEDURE — 93000 ELECTROCARDIOGRAM COMPLETE: CPT | Performed by: INTERNAL MEDICINE

## 2025-07-23 PROCEDURE — 1159F MED LIST DOCD IN RCRD: CPT | Performed by: INTERNAL MEDICINE

## 2025-07-23 PROCEDURE — 99214 OFFICE O/P EST MOD 30 MIN: CPT | Performed by: INTERNAL MEDICINE

## 2025-07-23 PROCEDURE — 3079F DIAST BP 80-89 MM HG: CPT | Performed by: INTERNAL MEDICINE

## 2025-07-23 PROCEDURE — 3075F SYST BP GE 130 - 139MM HG: CPT | Performed by: INTERNAL MEDICINE

## 2025-07-23 PROCEDURE — 1160F RVW MEDS BY RX/DR IN RCRD: CPT | Performed by: INTERNAL MEDICINE

## 2025-07-23 RX ORDER — AMIODARONE HYDROCHLORIDE 200 MG/1
200 TABLET ORAL DAILY
COMMUNITY
End: 2025-07-23 | Stop reason: SDUPTHER

## 2025-07-23 RX ORDER — AMIODARONE HYDROCHLORIDE 100 MG/1
100 TABLET ORAL DAILY
Qty: 90 TABLET | Refills: 1 | Status: SHIPPED | OUTPATIENT
Start: 2025-07-23

## 2025-07-23 RX ORDER — VALSARTAN 320 MG/1
320 TABLET ORAL DAILY
Qty: 90 TABLET | Refills: 2 | Status: SHIPPED | OUTPATIENT
Start: 2025-07-23 | End: 2025-08-22

## 2025-07-23 RX ORDER — NEBIVOLOL 5 MG/1
1 TABLET ORAL DAILY
COMMUNITY
Start: 2025-07-18

## 2025-07-23 NOTE — ASSESSMENT & PLAN NOTE
Chronic respiratory failure.  Patient feeling worse since discharge.  Blood pressure poorly controlled.  Currently on amiodarone for rhythm control of poorly tolerated atrial fibrillation.  Decrease amiodarone to 100 mg p.o. daily.  Aggressive blood pressure control.  Labs and chest x-ray in 1 month.  Follow-up with pulmonary regarding lung disease/right upper lobe opacity/follow-up PFTs.

## 2025-07-23 NOTE — ASSESSMENT & PLAN NOTE
Diagnosis when was inpatient on 6/11/2025.  On amiodarone 200 mg p.o. daily and Eliquis for thromboembolic prevention.  Infrequent palpitations.  Decrease amiodarone to 100 mg p.o. daily.  Clinical follow-up.

## 2025-07-23 NOTE — PROGRESS NOTES
[facility] [place]  [facility] CARDIOLOGY  [address] [place] [address]  Dept: [phone]  Dept Fax: [phone]    Date: 07/23/2025  Patient: Orin Munoz  YOB: 1955    Follow Up Office Visit Note    Interval Follow-up  Ms. Orin Munoz is a 70 y.o. female who is here for follow-up on Atrial Fibrillation and Peripheral Vascular Disease.    Subjective     History of Present Illness  The patient presents for evaluation of atrial fibrillation, shortness of breath, and hypertension.    She reports no chest pain. However, she experiences difficulty in breathing during physical activities such as walking around her house or exercising with her therapist. This symptom has been present since her hospital discharge. She is currently under the care of a pulmonologist in [place], with an appointment scheduled for 09/03/2025.    Her blood pressure remains elevated even while on medication. She has a history of high blood pressure. She is also on nebivolol, which was recently refilled, but she has not taken it yet as she was advised to discontinue it during her hospital stay. She took one pill today. She is currently taking amlodipine and valsartan for her hypertension.    SOCIAL HISTORY  Exercise: Walking and exercises with therapist    The following portions of the patient's history were reviewed and updated as appropriate: allergies, current medications, past family history, past medical history, past social history, past surgical history, and problem list.    Medications:   Allergies   Allergen Reactions    Penicillins Hives    Metaproterenol Other (See Comments)     Pt did not recognize this med     Ipratropium Bromide Anxiety    Metoprolol Hives    Other Hives     Alupent       Current Outpatient Medications   Medication Instructions    albuterol sulfate  (90 Base) MCG/ACT inhaler 2 puffs, Inhalation, Every 4 Hours PRN    allopurinol (ZYLOPRIM) 300 mg,  "Oral, Daily    amiodarone (PACERONE) 100 mg, Oral, Daily    amLODIPine (NORVASC) 5 mg, Oral, Daily    apixaban (ELIQUIS) 5 mg, 2 Times Daily    aspirin 81 mg, Daily    atorvastatin (LIPITOR) 40 mg, Oral, Daily    Evolocumab (REPATHA) 140 mg, Subcutaneous, Every 14 Days    ibandronate (BONIVA) 150 mg, Oral, Every 30 Days    icosapent ethyl (VASCEPA) 2 g, Oral, 2 Times Daily With Meals    nebivolol (BYSTOLIC) 5 MG tablet 1 tablet, Daily    potassium chloride 10 MEQ CR tablet 99 mg, Daily    sertraline (ZOLOFT) 100 mg, Oral, Daily, Replaces lexapro (escitalopram).    valsartan (DIOVAN) 320 mg, Oral, Daily       Tobacco Use: Medium Risk (7/23/2025)    Patient History     Smoking Tobacco Use: Former     Smokeless Tobacco Use: Never     Passive Exposure: Past        Objective  Vitals:    07/23/25 1112   BP: 130/88   BP Location: Right arm   Patient Position: Sitting   Cuff Size: Adult   Pulse: 66   Resp: 18   SpO2: 93%  Comment: 3 liters   Weight: 60.8 kg (134 lb)   Height: 162.6 cm (64\")   PainSc: 0-No pain      Vitals:    07/23/25 1112   BP: 130/88   BP Location: Right arm   Patient Position: Sitting   Cuff Size: Adult   Pulse: 66   Resp: 18   SpO2: 93%   Weight: 60.8 kg (134 lb)   Height: 162.6 cm (64\")          Physical Exam  Constitutional:       Appearance: Not in distress.   Neck:      Vascular: JVD normal.   Pulmonary:      Breath sounds: Decreased breath sounds present.   Cardiovascular:      Normal rate. Regular rhythm.      Murmurs: There is no murmur.   Pulses:     Intact distal pulses.   Edema:     Peripheral edema absent.   Neurological:      Mental Status: Alert.              Diagnostic Data  Lab Results   Component Value Date     06/25/2025    K 5.2 06/25/2025     06/25/2025    CO2 19 (L) 06/25/2025    MG 2 06/14/2025    BUN 28 (H) 06/25/2025    CREATININE 1.50 (H) 06/25/2025    CALCIUM 9.4 06/25/2025    BILITOT 0.2 06/25/2025    ALKPHOS 101 06/25/2025    ALT 21 06/25/2025    AST 22 06/25/2025 " "   GLUCOSE 89 06/25/2025    ALBUMIN 3.5 (L) 06/25/2025     Lab Results   Component Value Date    WBC 8.2 06/25/2025    HGB 9.7 (L) 06/25/2025    HCT 33.0 (L) 06/25/2025     06/25/2025     Lab Results   Component Value Date    INR 0.97 10/09/2024    PTT 27.7 10/09/2024     Lab Results   Component Value Date    CKTOTAL 99 12/08/2023    CKTOTAL 82 07/28/2023     No results found for: \"BNP\", \"PROBNP\"    Lab Results   Component Value Date    CHLPL 91 (L) 11/07/2024    TRIG 74 11/07/2024    HDL 44 11/07/2024    LDL 31 11/07/2024     Lab Results   Component Value Date    TSH 1.585 06/11/2025    FREET4 1.17 07/28/2023       [facility]:    ECG 12 Lead    Date/Time: 7/23/2025 11:44 AM  Performed by: Cornelio Rollins MD    Authorized by: Cornelio Rollins MD  Comparison: compared with previous ECG from 6/27/2025  Comparison to previous ECG: New onset incomplete right bundle branch block  Rhythm: sinus bradycardia  Conduction: incomplete right bundle branch block  Comments: Sinus bradycardia, incomplete right bundle branch block, no QT prolongation on amiodarone        CXR: Results for orders placed during the hospital encounter of 10/09/24    XR Chest PA & Lateral    Narrative  XR CHEST PA AND LATERAL    Date of Exam: 10/9/2024 1:39 PM EDT    Indication: Pre-Op Cardiac Surgery    Comparison: 1/16/2024    Findings:  Lung fields are moderately hyperinflated. There is biapical pleural-parenchymal scarring. There is prominence of central pulmonary arteries. Heart size is normal. There are no acute infiltrates or effusions.    Impression  Impression:  Emphysematous changes. No acute process.      Electronically Signed: Comfort Encinas MD  10/10/2024 2:20 PM EDT  Workstation ID: [id number]     ECHO/MUGA: No results found for this or any previous visit.     STRESS TESTS: Results for orders placed in visit on 05/30/24    Stress Test With Myocardial Perfusion One Day    Interpretation Summary    Impressions are consistent with a " normal/low risk Lexiscan nuclear study.    Myocardial perfusion imaging indicates a normal myocardial perfusion study with no evidence of ischemia.    Left ventricular ejection fraction is normal (Calculated EF = 65%).    Breast attenuation artifact is present.     CARDIAC CATH: No results found for this or any previous visit.     DEVICES: No valid procedures specified.   HOLTER: No results found for this or any previous visit.     CT/MRI:  No results found for this or any previous visit.    VASCULAR: No valid procedures specified.     Assessment and Plan  Diagnoses and all orders for this visit:    1. Primary hypertension (Primary)  Assessment & Plan:  Hypertension is uncontrolled due to running out of medications.  Medication refills sent.  Uptitrate Diovan to 320 mg p.o. daily.  Dietary sodium restriction.  Regular aerobic exercise.  Ambulatory blood pressure monitoring.  Blood pressure will be reassessed in 2 months.    Orders:  -     valsartan (DIOVAN) 320 MG tablet; Take 1 tablet by mouth Daily for 30 days.  Dispense: 90 tablet; Refill: 2    2. Mixed hyperlipidemia  Assessment & Plan:   Lipid abnormalities are improving with treatment    Plan:  Continue same medication/s without change.  Atorvastatin 80 mg p.o. daily and Vascepa 2 g twice daily    Discussed medication dosage, use, side effects, and goals of treatment in detail.    Counseled patient on lifestyle modifications to help control hyperlipidemia.   Cholesterol lowering dietary information shared with patient.  Advised patient to exercise for 150 minutes weekly. (30 minute brisk walk, 5 days a week for example)    Lab Results   Component Value Date    LDL 31 11/07/2024     (H) 12/08/2023     (H) 07/28/2023    HDL 44 11/07/2024    HDL 60 12/08/2023    HDL 45 07/28/2023    CHLPL 91 (L) 11/07/2024    CHLPL 211 (H) 12/08/2023    CHLPL 173 07/28/2023    TRIG 74 11/07/2024    TRIG 165 (H) 12/08/2023    TRIG 160 (H) 07/28/2023       Patient  Treatment Goals:   LDL goal is less than 70.  To target    Followup in 6 months.      3. Coronary artery calcification seen on CT scan  Assessment & Plan:  Heavy calcification seen.  EKG abnormal.  No chest pain.  Limited exercise capacity with left lower extremity cramping and shooting pain.  Lexiscan nuclear stress test negative.  Plan:  Risk factors modifications blood pressure control and lipid-lowering therapy      4. 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  Ascending thoracic aorta 3.8 cm and abdominal aorta 3.3 cm/4.1 cm distally.  Stable disease on CT abdominal aorta with distal runoff.  Follow-up every 1 to 2 years.      5. PVD (peripheral vascular disease) with claudication  Assessment & Plan:  Currently asymptomatic.  Used to be followed by Dr. Alicia, not anymore.  PVD seen prior stenting left femoral/iliac 6 years ago.  Past workup showing:  US duplex of BLE 3/2022  50-69% suggested narrowing in both CFA  30-59% narrowing in the BLE SFA.   Repeat CTA on 3/31/2025:  1. Aortoiliac and femoral-femoral bypass grafts patent without suspicious perigraft fluid or pseudoaneurysm.  2. Stable excluded infrarenal abdominal aortic aneurysm measuring up to 4 cm without evidence of endoleak. Stable varying degrees of stenosis involving the visceral branches, without evidence of new solid organ or hollow viscus ischemia. Stable left renal atrophy and delayed perfusion.  3. Bilateral two-vessel runoff at the level of the ankle including patent anterior and posterior tibial arteries and diminutive fibular arteries. Stable degrees of nonflow limiting inflow stenoses. Stable left common iliac artery stent occlusion.  Patient with clinical claudication left lower extremity, nonetheless current picture statica more dominant.  Risk factors modification: Blood pressure to goal, lipid-lowering therapy optimized.  Consider cilostazol for  claudication next visit if symptomatic.      6. Paroxysmal atrial fibrillation  Assessment & Plan:  Diagnosis when was inpatient on 6/11/2025.  On amiodarone 200 mg p.o. daily and Eliquis for thromboembolic prevention.  Infrequent palpitations.  Decrease amiodarone to 100 mg p.o. daily.  Clinical follow-up.    Orders:  -     amiodarone (PACERONE) 100 MG tablet; Take 1 tablet by mouth Daily.  Dispense: 90 tablet; Refill: 1  -     ECG 12 Lead    7. Shortness of breath  Assessment & Plan:  Chronic respiratory failure.  Patient feeling worse since discharge.  Blood pressure poorly controlled.  Currently on amiodarone for rhythm control of poorly tolerated atrial fibrillation.  Decrease amiodarone to 100 mg p.o. daily.  Aggressive blood pressure control.  Labs and chest x-ray in 1 month.  Follow-up with pulmonary regarding lung disease/right upper lobe opacity/follow-up PFTs.    Orders:  -     proBNP; Future  -     Comprehensive Metabolic Panel; Future  -     Magnesium; Future  -     CBC & Differential; Future  -     Iron Profile w/o Ferritin; Future  -     Ferritin; Future  -     XR Chest PA & Lateral; Future    8. Other iron deficiency anemia  -     Iron Profile w/o Ferritin; Future  -     Ferritin; Future         Return in about 2 months (around 9/23/2025) for Follow-up with Dr Rollins.    There are no Patient Instructions on file for this visit.       Patient or patient representative verbalized consent for the use of Ambient Listening during the visit with  Cornelio Rollins MD for chart documentation. 7/23/2025  12:41 EDT    Cornelio Rollins MD

## 2025-07-23 NOTE — ASSESSMENT & PLAN NOTE
Seen on low-dose CT chest and MRI.  Ascending thoracic aorta 3.8 cm and abdominal aorta 3.3 cm/4.1 cm distally.  Stable disease on CT abdominal aorta with distal runoff.  Follow-up every 1 to 2 years.

## 2025-07-23 NOTE — ASSESSMENT & PLAN NOTE
Heavy calcification seen.  EKG abnormal.  No chest pain.  Limited exercise capacity with left lower extremity cramping and shooting pain.  Lexiscan nuclear stress test negative.  Plan:  Risk factors modifications blood pressure control and lipid-lowering therapy

## 2025-07-23 NOTE — ASSESSMENT & PLAN NOTE
Hypertension is uncontrolled due to running out of medications.  Medication refills sent.  Uptitrate Diovan to 320 mg p.o. daily.  Dietary sodium restriction.  Regular aerobic exercise.  Ambulatory blood pressure monitoring.  Blood pressure will be reassessed in 2 months.

## 2025-08-07 DIAGNOSIS — I48.0 PAROXYSMAL ATRIAL FIBRILLATION: ICD-10-CM

## 2025-08-08 RX ORDER — CLOPIDOGREL BISULFATE 75 MG/1
75 TABLET ORAL DAILY
Qty: 90 TABLET | Refills: 0 | OUTPATIENT
Start: 2025-08-08

## 2025-08-17 DIAGNOSIS — R07.81 RIB PAIN ON RIGHT SIDE: ICD-10-CM

## 2025-08-17 DIAGNOSIS — J41.0 SIMPLE CHRONIC BRONCHITIS: ICD-10-CM

## 2025-08-18 RX ORDER — ALBUTEROL SULFATE 90 UG/1
2 INHALANT RESPIRATORY (INHALATION) EVERY 4 HOURS PRN
Qty: 10.8 G | Refills: 1 | Status: SHIPPED | OUTPATIENT
Start: 2025-08-18

## 2025-08-22 ENCOUNTER — CLINICAL SUPPORT (OUTPATIENT)
Dept: CARDIOLOGY | Facility: CLINIC | Age: 70
End: 2025-08-22
Payer: MEDICARE

## 2025-08-22 DIAGNOSIS — D50.8 OTHER IRON DEFICIENCY ANEMIA: ICD-10-CM

## 2025-08-22 DIAGNOSIS — R06.02 SHORTNESS OF BREATH: ICD-10-CM

## 2025-08-23 LAB
ALBUMIN SERPL-MCNC: 4.1 G/DL (ref 3.8–4.8)
ALP SERPL-CCNC: 99 IU/L (ref 44–121)
ALT SERPL-CCNC: 18 IU/L (ref 0–32)
AST SERPL-CCNC: 25 IU/L (ref 0–40)
BASOPHILS # BLD AUTO: 0 X10E3/UL (ref 0–0.2)
BASOPHILS NFR BLD AUTO: 0 %
BILIRUB SERPL-MCNC: 0.2 MG/DL (ref 0–1.2)
BUN SERPL-MCNC: 16 MG/DL (ref 8–27)
BUN/CREAT SERPL: 10 (ref 12–28)
CALCIUM SERPL-MCNC: 9.6 MG/DL (ref 8.7–10.3)
CHLORIDE SERPL-SCNC: 105 MMOL/L (ref 96–106)
CO2 SERPL-SCNC: 19 MMOL/L (ref 20–29)
CREAT SERPL-MCNC: 1.57 MG/DL (ref 0.57–1)
EGFRCR SERPLBLD CKD-EPI 2021: 35 ML/MIN/1.73
EOSINOPHIL # BLD AUTO: 0.4 X10E3/UL (ref 0–0.4)
EOSINOPHIL NFR BLD AUTO: 5 %
ERYTHROCYTE [DISTWIDTH] IN BLOOD BY AUTOMATED COUNT: 16.8 % (ref 11.7–15.4)
FERRITIN SERPL-MCNC: 36 NG/ML (ref 15–150)
GLOBULIN SER CALC-MCNC: 2.9 G/DL (ref 1.5–4.5)
GLUCOSE SERPL-MCNC: 85 MG/DL (ref 70–99)
HCT VFR BLD AUTO: 31.6 % (ref 34–46.6)
HGB BLD-MCNC: 9.7 G/DL (ref 11.1–15.9)
IMM GRANULOCYTES # BLD AUTO: 0 X10E3/UL (ref 0–0.1)
IMM GRANULOCYTES NFR BLD AUTO: 0 %
IRON SATN MFR SERPL: 8 % (ref 15–55)
IRON SERPL-MCNC: 23 UG/DL (ref 27–139)
LYMPHOCYTES # BLD AUTO: 2 X10E3/UL (ref 0.7–3.1)
LYMPHOCYTES NFR BLD AUTO: 25 %
MAGNESIUM SERPL-MCNC: 1.8 MG/DL (ref 1.6–2.3)
MCH RBC QN AUTO: 28.4 PG (ref 26.6–33)
MCHC RBC AUTO-ENTMCNC: 30.7 G/DL (ref 31.5–35.7)
MCV RBC AUTO: 93 FL (ref 79–97)
MONOCYTES # BLD AUTO: 0.6 X10E3/UL (ref 0.1–0.9)
MONOCYTES NFR BLD AUTO: 7 %
NEUTROPHILS # BLD AUTO: 5.2 X10E3/UL (ref 1.4–7)
NEUTROPHILS NFR BLD AUTO: 63 %
NT-PROBNP SERPL-MCNC: 201 PG/ML (ref 0–301)
PLATELET # BLD AUTO: 312 X10E3/UL (ref 150–450)
POTASSIUM SERPL-SCNC: 4.6 MMOL/L (ref 3.5–5.2)
PROT SERPL-MCNC: 7 G/DL (ref 6–8.5)
RBC # BLD AUTO: 3.41 X10E6/UL (ref 3.77–5.28)
SODIUM SERPL-SCNC: 141 MMOL/L (ref 134–144)
TIBC SERPL-MCNC: 299 UG/DL (ref 250–450)
UIBC SERPL-MCNC: 276 UG/DL (ref 118–369)
WBC # BLD AUTO: 8.3 X10E3/UL (ref 3.4–10.8)

## 2025-08-28 ENCOUNTER — RESULTS FOLLOW-UP (OUTPATIENT)
Dept: CARDIOLOGY | Facility: CLINIC | Age: 70
End: 2025-08-28
Payer: MEDICARE

## 2025-08-28 DIAGNOSIS — D64.9 ANEMIA, UNSPECIFIED TYPE: Primary | ICD-10-CM

## (undated) DEVICE — CATH GUIDE BERN 5F 65CM

## (undated) DEVICE — 150CC POWER INJ SYR: Brand: DEROYAL

## (undated) DEVICE — RADIFOCUS GLIDEWIRE ADVANTAGE GUIDEWIRE: Brand: GLIDEWIRE ADVANTAGE

## (undated) DEVICE — SYR LL TP 10ML STRL

## (undated) DEVICE — 3M™ IOBAN™ 2 ANTIMICROBIAL INCISE DRAPE 6650EZ: Brand: IOBAN™ 2

## (undated) DEVICE — BOWL UTIL STRL 32OZ

## (undated) DEVICE — SNAP KOVER: Brand: UNBRANDED

## (undated) DEVICE — PROXIMATE RH ROTATING HEAD SKIN STAPLERS (35 WIDE) CONTAINS 35 STAINLESS STEEL STAPLES: Brand: PROXIMATE

## (undated) DEVICE — SUT SILK 2/0 SH CR8 18IN CR8 C012D

## (undated) DEVICE — SUCTION CANISTER 2500CC: Brand: DEROYAL

## (undated) DEVICE — SYR LUERLOK 30CC

## (undated) DEVICE — CATH SZ ACCUVU SEG/20CM PIG .038IN 5F 70CM

## (undated) DEVICE — GLV SURG BIOGEL LTX PF 7 1/2

## (undated) DEVICE — SUT SILK 4/0 TIES 18IN A183H

## (undated) DEVICE — SUT SILK 3/0 TIES 18IN A184H

## (undated) DEVICE — INTENDED USE FOR SURGICAL MARKING ON INTACT SKIN, ALSO PROVIDES A PERMANENT METHOD OF IDENTIFYING OBJECTS IN THE OPERATING ROOM: Brand: WRITESITE® REGULAR TIP SKIN MARKER

## (undated) DEVICE — SUT SILK 0/0 CT2 18IN C027D

## (undated) DEVICE — TBG INJ CONTRL EXCITE RA 1200PSI 48IN

## (undated) DEVICE — GW AMPLTZ SUPERSTIFF STR .035IN 180CM

## (undated) DEVICE — HYPODERMIC SAFETY NEEDLE: Brand: MONOJECT

## (undated) DEVICE — LBL STRL BLANK

## (undated) DEVICE — SYR LUERLOK 50ML

## (undated) DEVICE — 3M™ IOBAN™ 2 ANTIMICROBIAL INCISE DRAPE 6651EZ: Brand: IOBAN™ 2

## (undated) DEVICE — BALN OCCL MOLDING .035 10TO37MM 4X90CM

## (undated) DEVICE — SUT PROLN SURGILENE 5.0 24IN BLU 9702H

## (undated) DEVICE — PENCL SMOKE/EVAC MEGADYNE TELESCP 10FT

## (undated) DEVICE — SYR LUERLOK 20CC BX/50

## (undated) DEVICE — PENCL ROCKRSWCH MEGADYNE W/HOLSTR 10FT SS

## (undated) DEVICE — NDL PERC 1PRT THNWALL W/BASEPLT 18G 7CM

## (undated) DEVICE — TRAP FLD MINIVAC MEGADYNE 100ML

## (undated) DEVICE — PK VASC 10

## (undated) DEVICE — ELECTRD BLD EZ CLN STD 2.5IN

## (undated) DEVICE — GLV SURG BIOGEL LTX PF 8

## (undated) DEVICE — IRRIGATOR BULB ASEPTO 60CC STRL

## (undated) DEVICE — SI AVANTI+ 7F STD W/GW  NO OBT: Brand: AVANTI

## (undated) DEVICE — SUT SILK 2/0 TIES 18IN A185H

## (undated) DEVICE — ANTIBACTERIAL UNDYED BRAIDED (POLYGLACTIN 910), SYNTHETIC ABSORBABLE SUTURE: Brand: COATED VICRYL

## (undated) DEVICE — SUT PROLN 7/0 BV1 D/A 24IN 8702H

## (undated) DEVICE — ADHS SKIN PREMIERPRO EXOFIN TOPICAL HI/VISC .5ML

## (undated) DEVICE — PATIENT RETURN ELECTRODE, SINGLE-USE, CONTACT QUALITY MONITORING, ADULT, WITH 9FT CORD, FOR PATIENTS WEIGING OVER 33LBS. (15KG): Brand: MEGADYNE

## (undated) DEVICE — DECANTER BAG 9": Brand: MEDLINE INDUSTRIES, INC.

## (undated) DEVICE — DRSNG SURG AQUACEL AG/ADVNTGE 9X15CM 3.5X6IN

## (undated) DEVICE — 3M™ STERI-DRAPE™ FLUOROSCOPE DRAPE, 10 PER CARTON / 4 CARTONS PER CASE, 1012: Brand: STERI-DRAPE™

## (undated) DEVICE — SYR CONTRL LUERLOK 10CC

## (undated) DEVICE — STERILE PVP: Brand: MEDLINE INDUSTRIES, INC.

## (undated) DEVICE — SUT PROLN 6/0 C1 D/A 30IN 8706H